# Patient Record
Sex: FEMALE | Race: WHITE | NOT HISPANIC OR LATINO | URBAN - METROPOLITAN AREA
[De-identification: names, ages, dates, MRNs, and addresses within clinical notes are randomized per-mention and may not be internally consistent; named-entity substitution may affect disease eponyms.]

---

## 2018-08-21 ENCOUNTER — APPOINTMENT (RX ONLY)
Dept: URBAN - METROPOLITAN AREA OTHER 10 | Facility: OTHER | Age: 33
Setting detail: DERMATOLOGY
End: 2018-08-21

## 2018-08-21 DIAGNOSIS — M32.10 SYSTEMIC LUPUS ERYTHEMATOSUS, ORGAN OR SYSTEM INVOLVEMENT UNSPECIFIED: ICD-10-CM

## 2018-08-21 DIAGNOSIS — L81.1 CHLOASMA: ICD-10-CM

## 2018-08-21 PROBLEM — L70.0 ACNE VULGARIS: Status: ACTIVE | Noted: 2018-08-21

## 2018-08-21 PROBLEM — F41.9 ANXIETY DISORDER, UNSPECIFIED: Status: ACTIVE | Noted: 2018-08-21

## 2018-08-21 PROCEDURE — ? PRESCRIPTION

## 2018-08-21 PROCEDURE — ? COUNSELING

## 2018-08-21 PROCEDURE — 99202 OFFICE O/P NEW SF 15 MIN: CPT

## 2018-08-21 PROCEDURE — ? TREATMENT REGIMEN

## 2018-08-21 RX ORDER — TRIAMCINOLONE ACETONIDE 1 MG/G
CREAM TOPICAL BID
Qty: 454 | Refills: 0 | Status: ERX | COMMUNITY
Start: 2018-08-21

## 2018-08-21 RX ADMIN — TRIAMCINOLONE ACETONIDE: 1 CREAM TOPICAL at 00:00

## 2018-08-21 ASSESSMENT — LOCATION DETAILED DESCRIPTION DERM
LOCATION DETAILED: LEFT UPPER CUTANEOUS LIP
LOCATION DETAILED: LEFT MEDIAL FOREHEAD
LOCATION DETAILED: RIGHT DISTAL POSTERIOR UPPER ARM
LOCATION DETAILED: LEFT DISTAL POSTERIOR UPPER ARM

## 2018-08-21 ASSESSMENT — LOCATION ZONE DERM
LOCATION ZONE: ARM
LOCATION ZONE: LIP
LOCATION ZONE: FACE

## 2018-08-21 ASSESSMENT — LOCATION SIMPLE DESCRIPTION DERM
LOCATION SIMPLE: LEFT POSTERIOR UPPER ARM
LOCATION SIMPLE: LEFT FOREHEAD
LOCATION SIMPLE: RIGHT POSTERIOR UPPER ARM
LOCATION SIMPLE: LEFT LIP

## 2018-08-21 NOTE — HPI: RASH
How Severe Is Your Rash?: moderate
Is This A New Presentation, Or A Follow-Up?: Rash
Additional History: Patient has lupus.

## 2018-08-21 NOTE — PROCEDURE: TREATMENT REGIMEN
Plan: Discussed discoloration defense 1-2 drops bid x 2 months then once a day
Detail Level: Simple
Otc Regimen: Differin pea size once daily on the face
Plan: Discussed avoid picking areas.
Initiate Treatment: Triamcinolone Cream bid x 2 weeks on arms

## 2018-10-04 ENCOUNTER — APPOINTMENT (RX ONLY)
Dept: URBAN - METROPOLITAN AREA OTHER 10 | Facility: OTHER | Age: 33
Setting detail: DERMATOLOGY
End: 2018-10-04

## 2018-10-04 DIAGNOSIS — L81.4 OTHER MELANIN HYPERPIGMENTATION: ICD-10-CM

## 2018-10-04 DIAGNOSIS — M32.10 SYSTEMIC LUPUS ERYTHEMATOSUS, ORGAN OR SYSTEM INVOLVEMENT UNSPECIFIED: ICD-10-CM | Status: WELL CONTROLLED

## 2018-10-04 DIAGNOSIS — L81.1 CHLOASMA: ICD-10-CM | Status: WELL CONTROLLED

## 2018-10-04 DIAGNOSIS — D22 MELANOCYTIC NEVI: ICD-10-CM

## 2018-10-04 DIAGNOSIS — D18.0 HEMANGIOMA: ICD-10-CM

## 2018-10-04 PROBLEM — D18.01 HEMANGIOMA OF SKIN AND SUBCUTANEOUS TISSUE: Status: ACTIVE | Noted: 2018-10-04

## 2018-10-04 PROBLEM — D22.5 MELANOCYTIC NEVI OF TRUNK: Status: ACTIVE | Noted: 2018-10-04

## 2018-10-04 PROBLEM — D22.71 MELANOCYTIC NEVI OF RIGHT LOWER LIMB, INCLUDING HIP: Status: ACTIVE | Noted: 2018-10-04

## 2018-10-04 PROBLEM — D22.61 MELANOCYTIC NEVI OF RIGHT UPPER LIMB, INCLUDING SHOULDER: Status: ACTIVE | Noted: 2018-10-04

## 2018-10-04 PROBLEM — D22.72 MELANOCYTIC NEVI OF LEFT LOWER LIMB, INCLUDING HIP: Status: ACTIVE | Noted: 2018-10-04

## 2018-10-04 PROBLEM — D22.62 MELANOCYTIC NEVI OF LEFT UPPER LIMB, INCLUDING SHOULDER: Status: ACTIVE | Noted: 2018-10-04

## 2018-10-04 PROCEDURE — ? COUNSELING

## 2018-10-04 PROCEDURE — ? TREATMENT REGIMEN

## 2018-10-04 PROCEDURE — 99214 OFFICE O/P EST MOD 30 MIN: CPT

## 2018-10-04 PROCEDURE — ? COSMETIC CONSULTATION: CHEMICAL PEELS

## 2018-10-04 ASSESSMENT — LOCATION DETAILED DESCRIPTION DERM
LOCATION DETAILED: LEFT LATERAL ABDOMEN
LOCATION DETAILED: LEFT MEDIAL FOREHEAD
LOCATION DETAILED: LEFT PROXIMAL POSTERIOR UPPER ARM
LOCATION DETAILED: RIGHT INFERIOR UPPER BACK
LOCATION DETAILED: RIGHT ANTERIOR DISTAL THIGH
LOCATION DETAILED: RIGHT LATERAL ABDOMEN
LOCATION DETAILED: LEFT MEDIAL UPPER BACK
LOCATION DETAILED: LEFT INFERIOR LATERAL MIDBACK
LOCATION DETAILED: LEFT ANTERIOR PROXIMAL THIGH
LOCATION DETAILED: SUPERIOR LUMBAR SPINE
LOCATION DETAILED: LEFT UPPER CUTANEOUS LIP
LOCATION DETAILED: RIGHT LATERAL BREAST 7-8:00 REGION
LOCATION DETAILED: PERIUMBILICAL SKIN
LOCATION DETAILED: LEFT SUPERIOR UPPER BACK
LOCATION DETAILED: LEFT ANTERIOR DISTAL UPPER ARM
LOCATION DETAILED: RIGHT SUPERIOR UPPER BACK
LOCATION DETAILED: RIGHT ANTERIOR DISTAL UPPER ARM
LOCATION DETAILED: RIGHT DISTAL POSTERIOR UPPER ARM
LOCATION DETAILED: RIGHT PROXIMAL POSTERIOR UPPER ARM
LOCATION DETAILED: RIGHT PROXIMAL PRETIBIAL REGION
LOCATION DETAILED: EPIGASTRIC SKIN
LOCATION DETAILED: LEFT DISTAL POSTERIOR UPPER ARM
LOCATION DETAILED: SUBXIPHOID

## 2018-10-04 ASSESSMENT — LOCATION ZONE DERM
LOCATION ZONE: TRUNK
LOCATION ZONE: FACE
LOCATION ZONE: LEG
LOCATION ZONE: LIP
LOCATION ZONE: ARM

## 2018-10-04 ASSESSMENT — LOCATION SIMPLE DESCRIPTION DERM
LOCATION SIMPLE: LEFT UPPER BACK
LOCATION SIMPLE: RIGHT POSTERIOR UPPER ARM
LOCATION SIMPLE: RIGHT UPPER ARM
LOCATION SIMPLE: RIGHT UPPER BACK
LOCATION SIMPLE: LEFT FOREHEAD
LOCATION SIMPLE: LEFT LIP
LOCATION SIMPLE: RIGHT PRETIBIAL REGION
LOCATION SIMPLE: LEFT LOWER BACK
LOCATION SIMPLE: LOWER BACK
LOCATION SIMPLE: ABDOMEN
LOCATION SIMPLE: LEFT UPPER ARM
LOCATION SIMPLE: RIGHT BREAST
LOCATION SIMPLE: LEFT THIGH
LOCATION SIMPLE: RIGHT THIGH
LOCATION SIMPLE: LEFT POSTERIOR UPPER ARM

## 2018-10-04 ASSESSMENT — SEVERITY ASSESSMENT
SEVERITY: MILD, SLIGHTLY DARKER THAN THE SURROUNDING NORMAL SKIN
SEVERITY: CLEAR

## 2018-10-04 ASSESSMENT — PAIN INTENSITY VAS: HOW INTENSE IS YOUR PAIN 0 BEING NO PAIN, 10 BEING THE MOST SEVERE PAIN POSSIBLE?: NO PAIN

## 2018-10-04 NOTE — PROCEDURE: TREATMENT REGIMEN
Continue Regimen: Differin pea size once daily on the face
Detail Level: Simple
Otc Regimen: Continue Neutrogena sunscreen
Modify Regimen: Triamcinolone Cream bid x 2 weeks only as needed for flares

## 2019-05-21 ENCOUNTER — APPOINTMENT (RX ONLY)
Dept: URBAN - METROPOLITAN AREA OTHER 10 | Facility: OTHER | Age: 34
Setting detail: DERMATOLOGY
End: 2019-05-21

## 2019-05-21 DIAGNOSIS — M32.10 SYSTEMIC LUPUS ERYTHEMATOSUS, ORGAN OR SYSTEM INVOLVEMENT UNSPECIFIED: ICD-10-CM

## 2019-05-21 DIAGNOSIS — L01.01 NON-BULLOUS IMPETIGO: ICD-10-CM

## 2019-05-21 PROCEDURE — 99213 OFFICE O/P EST LOW 20 MIN: CPT

## 2019-05-21 PROCEDURE — ? PRESCRIPTION

## 2019-05-21 PROCEDURE — ? COUNSELING

## 2019-05-21 PROCEDURE — ? TREATMENT REGIMEN

## 2019-05-21 RX ORDER — DESOXIMETASONE 2.5 MG/G
CREAM TOPICAL
Qty: 60 | Refills: 0 | Status: ERX | COMMUNITY
Start: 2019-05-21

## 2019-05-21 RX ORDER — NEOMYCIN SULFATE AND FLUOCINOLONE ACETONIDE 3.5; .25 MG/G; MG/G
CREAM TOPICAL
Qty: 60 | Refills: 0 | Status: ERX | COMMUNITY
Start: 2019-05-21

## 2019-05-21 RX ADMIN — DESOXIMETASONE: 2.5 CREAM TOPICAL at 00:00

## 2019-05-21 RX ADMIN — NEOMYCIN SULFATE AND FLUOCINOLONE ACETONIDE: 3.5; .25 CREAM TOPICAL at 00:00

## 2019-05-21 ASSESSMENT — LOCATION DETAILED DESCRIPTION DERM
LOCATION DETAILED: MID POSTERIOR NECK
LOCATION DETAILED: GLABELLA
LOCATION DETAILED: RIGHT DISTAL DORSAL FOREARM
LOCATION DETAILED: LEFT INFERIOR POSTERIOR HELIX
LOCATION DETAILED: MID-OCCIPITAL SCALP
LOCATION DETAILED: LEFT DISTAL POSTERIOR UPPER ARM

## 2019-05-21 ASSESSMENT — LOCATION SIMPLE DESCRIPTION DERM
LOCATION SIMPLE: RIGHT FOREARM
LOCATION SIMPLE: LEFT POSTERIOR UPPER ARM
LOCATION SIMPLE: LEFT EAR
LOCATION SIMPLE: POSTERIOR NECK
LOCATION SIMPLE: POSTERIOR SCALP
LOCATION SIMPLE: GLABELLA

## 2019-05-21 ASSESSMENT — LOCATION ZONE DERM
LOCATION ZONE: SCALP
LOCATION ZONE: ARM
LOCATION ZONE: FACE
LOCATION ZONE: EAR
LOCATION ZONE: NECK

## 2019-05-21 NOTE — PROCEDURE: TREATMENT REGIMEN
Detail Level: Simple
Initiate Treatment: Neosynalar Apply to affected areas of the ear twice daily x2 weeks then Discontinue

## 2019-06-12 ENCOUNTER — APPOINTMENT (RX ONLY)
Dept: URBAN - METROPOLITAN AREA OTHER 10 | Facility: OTHER | Age: 34
Setting detail: DERMATOLOGY
End: 2019-06-12

## 2019-06-12 DIAGNOSIS — M32.10 SYSTEMIC LUPUS ERYTHEMATOSUS, ORGAN OR SYSTEM INVOLVEMENT UNSPECIFIED: ICD-10-CM | Status: IMPROVED

## 2019-06-12 DIAGNOSIS — L01.01 NON-BULLOUS IMPETIGO: ICD-10-CM | Status: RESOLVING

## 2019-06-12 PROCEDURE — 99213 OFFICE O/P EST LOW 20 MIN: CPT

## 2019-06-12 PROCEDURE — ? COUNSELING

## 2019-06-12 PROCEDURE — ? TREATMENT REGIMEN

## 2019-06-12 ASSESSMENT — LOCATION DETAILED DESCRIPTION DERM
LOCATION DETAILED: LEFT INFERIOR POSTERIOR HELIX
LOCATION DETAILED: GLABELLA
LOCATION DETAILED: RIGHT DISTAL DORSAL FOREARM
LOCATION DETAILED: LEFT DISTAL POSTERIOR UPPER ARM
LOCATION DETAILED: MID POSTERIOR NECK
LOCATION DETAILED: MID-OCCIPITAL SCALP

## 2019-06-12 ASSESSMENT — LOCATION ZONE DERM
LOCATION ZONE: ARM
LOCATION ZONE: NECK
LOCATION ZONE: FACE
LOCATION ZONE: SCALP
LOCATION ZONE: EAR

## 2019-06-12 ASSESSMENT — LOCATION SIMPLE DESCRIPTION DERM
LOCATION SIMPLE: RIGHT FOREARM
LOCATION SIMPLE: POSTERIOR NECK
LOCATION SIMPLE: GLABELLA
LOCATION SIMPLE: LEFT POSTERIOR UPPER ARM
LOCATION SIMPLE: POSTERIOR SCALP
LOCATION SIMPLE: LEFT EAR

## 2019-06-12 NOTE — PROCEDURE: TREATMENT REGIMEN
Detail Level: Simple
Modify Regimen: Neosynalar once daily x1 more week then Discontinue
Continue Regimen: Desoximetasone as needed for flares

## 2022-06-26 ENCOUNTER — APPOINTMENT (OUTPATIENT)
Dept: CARDIOLOGY | Facility: HOSPITAL | Age: 37
End: 2022-06-26

## 2022-06-26 ENCOUNTER — HOSPITAL ENCOUNTER (EMERGENCY)
Facility: HOSPITAL | Age: 37
Discharge: HOME | End: 2022-06-26
Attending: EMERGENCY MEDICINE | Admitting: EMERGENCY MEDICINE

## 2022-06-26 VITALS
SYSTOLIC BLOOD PRESSURE: 125 MMHG | TEMPERATURE: 98.4 F | HEART RATE: 97 BPM | RESPIRATION RATE: 18 BRPM | WEIGHT: 161.6 LBS | DIASTOLIC BLOOD PRESSURE: 83 MMHG | OXYGEN SATURATION: 100 %

## 2022-06-26 DIAGNOSIS — I82.412 ACUTE DEEP VEIN THROMBOSIS (DVT) OF FEMORAL VEIN OF LEFT LOWER EXTREMITY: Primary | ICD-10-CM

## 2022-06-26 LAB
BH CV LOW VAS LEFT MID FEMORAL SPONT: 1
BH CV LOW VAS LEFT PROXIMAL FEMORAL SPONT: 1
BH CV LOWER VASCULAR LEFT COMMON FEMORAL AUGMENT: NORMAL
BH CV LOWER VASCULAR LEFT COMMON FEMORAL COMPETENT: NORMAL
BH CV LOWER VASCULAR LEFT COMMON FEMORAL COMPRESS: NORMAL
BH CV LOWER VASCULAR LEFT COMMON FEMORAL PHASIC: NORMAL
BH CV LOWER VASCULAR LEFT COMMON FEMORAL SPONT: NORMAL
BH CV LOWER VASCULAR LEFT DISTAL FEMORAL COMPRESS: NORMAL
BH CV LOWER VASCULAR LEFT GASTRONEMIUS COMPRESS: NORMAL
BH CV LOWER VASCULAR LEFT GREATER SAPH AK COMPRESS: NORMAL
BH CV LOWER VASCULAR LEFT GREATER SAPH BK COMPRESS: NORMAL
BH CV LOWER VASCULAR LEFT LESSER SAPH COMPRESS: NORMAL
BH CV LOWER VASCULAR LEFT MID FEMORAL AUGMENT: NORMAL
BH CV LOWER VASCULAR LEFT MID FEMORAL COMPETENT: NORMAL
BH CV LOWER VASCULAR LEFT MID FEMORAL COMPRESS: NORMAL
BH CV LOWER VASCULAR LEFT MID FEMORAL PHASIC: NORMAL
BH CV LOWER VASCULAR LEFT MID FEMORAL SPONT: NORMAL
BH CV LOWER VASCULAR LEFT PERONEAL COMPRESS: NORMAL
BH CV LOWER VASCULAR LEFT POPLITEAL AUGMENT: NORMAL
BH CV LOWER VASCULAR LEFT POPLITEAL COMPETENT: NORMAL
BH CV LOWER VASCULAR LEFT POPLITEAL COMPRESS: NORMAL
BH CV LOWER VASCULAR LEFT POPLITEAL PHASIC: NORMAL
BH CV LOWER VASCULAR LEFT POPLITEAL SPONT: NORMAL
BH CV LOWER VASCULAR LEFT POSTERIOR TIBIAL COMPRESS: NORMAL
BH CV LOWER VASCULAR LEFT PROFUNDA FEMORAL COMPRESS: NORMAL
BH CV LOWER VASCULAR LEFT PROXIMAL FEMORAL AUGMENT: NORMAL
BH CV LOWER VASCULAR LEFT PROXIMAL FEMORAL COMPETENT: NORMAL
BH CV LOWER VASCULAR LEFT PROXIMAL FEMORAL COMPRESS: NORMAL
BH CV LOWER VASCULAR LEFT PROXIMAL FEMORAL PHASIC: NORMAL
BH CV LOWER VASCULAR LEFT PROXIMAL FEMORAL SPONT: NORMAL
BH CV LOWER VASCULAR LEFT SAPHENOFEMORAL JUNCTION COMPRESS: NORMAL
BH CV LOWER VASCULAR RIGHT COMMON FEMORAL AUGMENT: NORMAL
BH CV LOWER VASCULAR RIGHT COMMON FEMORAL COMPETENT: NORMAL
BH CV LOWER VASCULAR RIGHT COMMON FEMORAL COMPRESS: NORMAL
BH CV LOWER VASCULAR RIGHT COMMON FEMORAL PHASIC: NORMAL
BH CV LOWER VASCULAR RIGHT COMMON FEMORAL SPONT: NORMAL
MAXIMAL PREDICTED HEART RATE: 184 BPM
STRESS TARGET HR: 156 BPM

## 2022-06-26 PROCEDURE — 99283 EMERGENCY DEPT VISIT LOW MDM: CPT

## 2022-06-26 PROCEDURE — 93971 EXTREMITY STUDY: CPT

## 2022-06-26 PROCEDURE — 93971 EXTREMITY STUDY: CPT | Performed by: SURGERY

## 2022-06-26 RX ADMIN — APIXABAN 10 MG: 5 TABLET, FILM COATED ORAL at 15:57

## 2022-06-26 NOTE — ED PROVIDER NOTES
Subjective   History of Present Illness  Patient is a 37-year-old female that presents to the emergency department for left leg pain.  She states that 2 weeks ago her dog tripped her and she fell forward landing on her left knee.  She had significant swelling and bruising at that time.  Since then her leg has been more swollen and painful from ankle to hip.  Patient reports a history of DVT in the same leg 15 years ago and is concerned that she has another one at this time.  Review of Systems  I performed a 10 point review of systems which was all negative, except for the positives found in the HPI above.  Past Medical History:   Diagnosis Date   • Fibromyalgia    • Insomnia    • SLE (systemic lupus erythematosus) ([facility])        Allergies   Allergen Reactions   • Penicillins Hives   • Sulfa Antibiotics Hives       Past Surgical History:   Procedure Laterality Date   •  SECTION     • TONSILLECTOMY         History reviewed. No pertinent family history.    Social History     Socioeconomic History   • Marital status:    Tobacco Use   • Smoking status: Never Smoker   Substance and Sexual Activity   • Alcohol use: Yes     Comment: social           Objective   Physical Exam    General: Awake alert and in no acute distress     HEENT: Head normocephalic atraumatic, eyes PERRLA EOMI, nose normal, oropharynx normal.     Neck: Supple full range of motion, no meningismus, no lymphadenopathy     Heart: Regular rate and rhythm, no murmurs or rubs, 2+ radial pulses bilaterally     Lungs: Clear to auscultation bilaterally without wheezes or crackles, no respiratory distress     Abdomen: Soft, nontender, nondistended, no rebound or guarding     Back exam:  No L-spine tenderness.  No rash.     Skin: Warm, dry, no rash     Musculoskeletal: Normal range of motion, 1+ pitting edema in left ankle, moderate swelling extending from hip down, ecchymosis in various stages of feeling scattered about leg.  Tenderness with palpation  posterior thigh.  +2 DP pulses bilaterally     Neurologic: Oriented x3, no motor deficits no sensory deficits     Psychiatric: Mood appears stable, no psychosis  Procedures           ED Course                                           MDM  Number of Diagnoses or Management Options  Acute deep vein thrombosis (DVT) of femoral vein of left lower extremity (HCC)  Diagnosis management comments: Venous Doppler study revealed left femoral DVT.  Patient was started on Eliquis, first dose was given in the emergency department. I have spoken with the patient and explained the patient´s condition, diagnoses and treatment plan based on the information available to me at this time. I have answered the patient's questions and addressed any concerns. The patient has a good  understanding of the diagnosis, condition, and treatment plan as can be expected at this point. The vital signs have been stable. The patient´s condition is stable and appropriate for discharge from the emergency department.      The patient will pursue further outpatient evaluation with the primary care physician or other designated or consulting physician as outlined in the discharge instructions. They are agreeable to this plan of care and follow-up instructions have been explained in detail. The patient has received these instructions in written format and has expressed an understanding of the discharge instructions. The patient is aware that any significant change in condition or worsening of symptoms should prompt an immediate return to this or the closest emergency department or call to 911.       Amount and/or Complexity of Data Reviewed  Tests in the radiology section of CPT®: ordered and reviewed    Risk of Complications, Morbidity, and/or Mortality  Presenting problems: low  Diagnostic procedures: minimal  Management options: minimal    Patient Progress  Patient progress: stable      Final diagnoses:   Acute deep vein thrombosis (DVT) of femoral  vein of left lower extremity (HCC)       ED Disposition  ED Disposition     ED Disposition   Discharge    Condition   Stable    Comment   --             Provider, No Known  [facility]  [place] [address]    Call   Schedule appointment in 1 week for reevaluation.         Medication List      New Prescriptions    Apixaban Starter Pack tablet therapy pack  Take two 5 mg tablets by mouth every 12 hours for 7 days. Followed by one 5 mg tablet every 12 hours. (Dispense starter pack if available)           Where to Get Your Medications      These medications were sent to [id number] - [place], [place] - [address] AT [facility] - [phone] [facility] [phone] FX  [address] [place] [address]    Hours: 24-hours Phone: [phone]   · Apixaban Starter Pack tablet therapy pack          Sherley Barber APRN  06/26/22 [id number]

## 2022-06-26 NOTE — DISCHARGE INSTRUCTIONS
Take the Eliquis as prescribed.  Take Tylenol as needed for pain relief.  Wear compression stockings and elevate your legs frequently.    Return for any new concerns, particularly if you develop shortness of breath or chest pain.

## 2022-07-07 ENCOUNTER — OFFICE VISIT (OUTPATIENT)
Dept: FAMILY MEDICINE CLINIC | Facility: CLINIC | Age: 37
End: 2022-07-07

## 2022-07-07 VITALS
WEIGHT: 161 LBS | HEIGHT: 64 IN | DIASTOLIC BLOOD PRESSURE: 84 MMHG | BODY MASS INDEX: 27.49 KG/M2 | SYSTOLIC BLOOD PRESSURE: 120 MMHG | HEART RATE: 98 BPM | OXYGEN SATURATION: 99 %

## 2022-07-07 DIAGNOSIS — Z86.718 HISTORY OF DVT (DEEP VEIN THROMBOSIS): ICD-10-CM

## 2022-07-07 DIAGNOSIS — I82.402 ACUTE DEEP VEIN THROMBOSIS (DVT) OF LEFT LOWER EXTREMITY, UNSPECIFIED VEIN: ICD-10-CM

## 2022-07-07 DIAGNOSIS — M32.9 SYSTEMIC LUPUS ERYTHEMATOSUS, UNSPECIFIED SLE TYPE, UNSPECIFIED ORGAN INVOLVEMENT STATUS: Primary | ICD-10-CM

## 2022-07-07 DIAGNOSIS — F32.A DEPRESSION, UNSPECIFIED DEPRESSION TYPE: ICD-10-CM

## 2022-07-07 DIAGNOSIS — R53.83 FATIGUE, UNSPECIFIED TYPE: ICD-10-CM

## 2022-07-07 PROCEDURE — 99204 OFFICE O/P NEW MOD 45 MIN: CPT | Performed by: NURSE PRACTITIONER

## 2022-07-07 RX ORDER — BUPROPION HYDROCHLORIDE 150 MG/1
150 TABLET ORAL DAILY
Qty: 30 TABLET | Refills: 1 | Status: SHIPPED | OUTPATIENT
Start: 2022-07-07 | End: 2022-09-06 | Stop reason: SDUPTHER

## 2022-07-07 RX ORDER — MILNACIPRAN HCL 12.5 MG
TABLET ORAL 2 TIMES DAILY
COMMUNITY
End: 2022-09-06 | Stop reason: SDUPTHER

## 2022-07-07 RX ORDER — ERGOCALCIFEROL 1.25 MG/1
50000 CAPSULE ORAL WEEKLY
COMMUNITY

## 2022-07-07 NOTE — ASSESSMENT & PLAN NOTE
Patient has now had 2 DVTs, one essentially unprovoked, the second may have been provoked by injury but they are unsure.  Patient also has a history of lupus.  We will refer to hematology for further evaluation and work-up.

## 2022-07-07 NOTE — PROGRESS NOTES
Chief Complaint  Establish Care; DVT (Patient was seen at [facility] ER  for DVT.); and Thea, Fibromyalgia (Patient would like referral to rheumatology )    Subjective          Eloisa Sims presents to [facility] for   History of Present Illness  Patient presents today to establish care, has a history of lupus and fibromyalgia and was recently diagnosed with her second DVT.  Pt states she was recently dx with a DVT, this is her second one, she had another with no known cause at that time at the age of 20yrs. they started her on Eliquis Dosepak and told her to follow-up primary care    Pt does have a hx of lupus. Taking plaquenil     Patient is complaining of significant fatigue, states that she had an evaluation with her PCP before moving here from [place] recently related a significant amount of labs all of which were normal, states that they had decided to try some supplements to help with the fatigue but nothing has improved it.  Patient states that she is fixing to start a new job working full-time and that she needs something to help with the fatigue, she thinks that her depression contributes to it as well as possibly her depression medication, however she wishes to stay on Savella as she is doing fairly well with this.  Medical History  Past Medical History:   Diagnosis Date   • Anxiety YOLIE-    • Deep vein thrombosis (HCC)    • Fibromyalgia    • Fibromyalgia, primary    • Insomnia    • SLE (systemic lupus erythematosus) (HCC)      Surgical History  Past Surgical History:   Procedure Laterality Date   •  SECTION     • TONSILLECTOMY       Social History  Social History     Socioeconomic History   • Marital status:    Tobacco Use   • Smoking status: Never Smoker   Substance and Sexual Activity   • Alcohol use: Yes     Comment: social/rare drinker of wine   • Drug use: Never   • Sexual activity: Yes     Partners: Male     Comment:  had  "vasectomy       Current Outpatient Medications:   •  Apixaban Starter Pack tablet therapy pack, Take two 5 mg tablets by mouth every 12 hours for 7 days. Followed by one 5 mg tablet every 12 hours. (Dispense starter pack if available), Disp: 74 tablet, Rfl: 0  •  CLONAZEPAM PO, 1 mg by Other route. Take one tablet by mouth daily PRN, Disp: , Rfl:   •  FOLIC ACID PO, Take 1 mg by mouth Daily., Disp: , Rfl:   •  Hydroxychloroquine Sulfate (PLAQUENIL PO), Take 200 mg by mouth Daily., Disp: , Rfl:   •  Milnacipran HCl (Savella) 12.5 MG tablet, 2 (Two) Times a Day., Disp: , Rfl:   •  vitamin D (ERGOCALCIFEROL) 1.25 MG (54417 UT) capsule capsule, Take 50,000 Units by mouth 1 (One) Time Per Week., Disp: , Rfl:   •  Zolpidem Tartrate (AMBIEN PO), Take 10 mg by mouth every night at bedtime., Disp: , Rfl:   •  apixaban (ELIQUIS) 5 MG tablet tablet, Take 1 tablet by mouth 2 (Two) Times a Day., Disp: 60 tablet, Rfl: 0  •  buPROPion XL (Wellbutrin XL) 150 MG 24 hr tablet, Take 1 tablet by mouth Daily., Disp: 30 tablet, Rfl: 1    Review of Systems     Objective     /84   Pulse 98   Ht 162.6 cm (64\")   Wt 73 kg (161 lb)   SpO2 99%   BMI 27.64 kg/m²     Body mass index is 27.64 kg/m².    Physical Exam  Vitals reviewed.   Constitutional:       Appearance: Normal appearance. She is well-developed.   HENT:      Head: Normocephalic and atraumatic.      Right Ear: External ear normal.      Left Ear: External ear normal.   Eyes:      Conjunctiva/sclera: Conjunctivae normal.      Pupils: Pupils are equal, round, and reactive to light.   Cardiovascular:      Rate and Rhythm: Normal rate and regular rhythm.      Heart sounds: No murmur heard.    No friction rub. No gallop.      Comments: Trace edema left lower extremity  Pulmonary:      Effort: Pulmonary effort is normal.      Breath sounds: Normal breath sounds. No wheezing or rhonchi.   Skin:     General: Skin is warm and dry.   Neurological:      Mental Status: She is alert " and oriented to person, place, and time.      Cranial Nerves: No cranial nerve deficit.   Psychiatric:         Mood and Affect: Mood and affect normal.         Behavior: Behavior normal.         Thought Content: Thought content normal.         Judgment: Judgment normal.         Result Review :     The following data was reviewed by: TIERRA Sharma on 07/07/2022:                   Data reviewed: Recent hospitalization notes Recent ER visit as well as Doppler of the left lower extremity              {CC Problem List  Visit Diagnosis  ROS  Review (Popup)  [facility]  Quality  BestPractice  Medications  SmartSets  SnapShot Encounters  Media :23}   Assessment:  Diagnoses and all orders for this visit:    1. Systemic lupus erythematosus, unspecified SLE type, unspecified organ involvement status (HCC) (Primary)  -     Ambulatory Referral to Rheumatology  -     Ambulatory Referral to Hematology    2. Acute deep vein thrombosis (DVT) of left lower extremity, unspecified vein (HCC)  Assessment & Plan:  Patient has now had 2 DVTs, one essentially unprovoked, the second may have been provoked by injury but they are unsure.  Patient also has a history of lupus.  We will refer to hematology for further evaluation and work-up.    Orders:  -     Ambulatory Referral to Hematology    3. History of DVT (deep vein thrombosis)  -     Ambulatory Referral to Hematology  -     apixaban (ELIQUIS) 5 MG tablet tablet; Take 1 tablet by mouth 2 (Two) Times a Day.  Dispense: 60 tablet; Refill: 0    4. Fatigue, unspecified type    5. Depression, unspecified depression type  Assessment & Plan:  Patient's depression is fairly well controlled, however she still experiences significant fatigue, she is unsure if this is in relation to the depression itself only or if the medication to treat the depression is also contributing factor.  After discussion we have decided to trial the addition of Wellbutrin.  Side effects and  admin discussed, risk of serotonin syndrome discussed.    Orders:  -     buPROPion XL (Wellbutrin XL) 150 MG 24 hr tablet; Take 1 tablet by mouth Daily.  Dispense: 30 tablet; Refill: 1              Follow Up     Return in about 2 months (around 9/7/2022).    Patient was given instructions and counseling regarding her condition or for health maintenance advice. Please see specific information pulled into the AVS if appropriate.     Ling Ignacio, TIERRA  07/07/2022

## 2022-07-07 NOTE — ASSESSMENT & PLAN NOTE
Patient's depression is fairly well controlled, however she still experiences significant fatigue, she is unsure if this is in relation to the depression itself only or if the medication to treat the depression is also contributing factor.  After discussion we have decided to trial the addition of Wellbutrin.  Side effects and admin discussed, risk of serotonin syndrome discussed.

## 2022-07-08 ENCOUNTER — PATIENT ROUNDING (BHMG ONLY) (OUTPATIENT)
Dept: FAMILY MEDICINE CLINIC | Facility: CLINIC | Age: 37
End: 2022-07-08

## 2022-07-08 NOTE — PROGRESS NOTES
A [employer] message has been sent to the patient for PATIENT ROUNDING with [facility].

## 2022-07-13 ENCOUNTER — LAB (OUTPATIENT)
Dept: ONCOLOGY | Facility: HOSPITAL | Age: 37
End: 2022-07-13

## 2022-07-13 ENCOUNTER — CONSULT (OUTPATIENT)
Dept: ONCOLOGY | Facility: HOSPITAL | Age: 37
End: 2022-07-13

## 2022-07-13 VITALS
BODY MASS INDEX: 27.36 KG/M2 | RESPIRATION RATE: 18 BRPM | TEMPERATURE: 97.8 F | OXYGEN SATURATION: 100 % | SYSTOLIC BLOOD PRESSURE: 152 MMHG | HEART RATE: 100 BPM | DIASTOLIC BLOOD PRESSURE: 86 MMHG | WEIGHT: 159.39 LBS

## 2022-07-13 DIAGNOSIS — M32.9 SYSTEMIC LUPUS ERYTHEMATOSUS, UNSPECIFIED SLE TYPE, UNSPECIFIED ORGAN INVOLVEMENT STATUS: ICD-10-CM

## 2022-07-13 DIAGNOSIS — Z86.718 HISTORY OF DVT (DEEP VEIN THROMBOSIS): ICD-10-CM

## 2022-07-13 DIAGNOSIS — I82.402 ACUTE DEEP VEIN THROMBOSIS (DVT) OF LEFT LOWER EXTREMITY, UNSPECIFIED VEIN: ICD-10-CM

## 2022-07-13 DIAGNOSIS — I82.402 ACUTE DEEP VEIN THROMBOSIS (DVT) OF LEFT LOWER EXTREMITY, UNSPECIFIED VEIN: Primary | ICD-10-CM

## 2022-07-13 LAB
ALBUMIN SERPL-MCNC: 4.41 G/DL (ref 3.5–5.2)
ALBUMIN/GLOB SERPL: 1.6 G/DL
ALP SERPL-CCNC: 62 U/L (ref 39–117)
ALT SERPL W P-5'-P-CCNC: 21 U/L (ref 1–33)
ANION GAP SERPL CALCULATED.3IONS-SCNC: 6.9 MMOL/L (ref 5–15)
AST SERPL-CCNC: 22 U/L (ref 1–32)
BASOPHILS # BLD AUTO: 0.02 10*3/MM3 (ref 0–0.2)
BASOPHILS NFR BLD AUTO: 0.9 % (ref 0–1.5)
BILIRUB SERPL-MCNC: 0.4 MG/DL (ref 0–1.2)
BUN SERPL-MCNC: 12 MG/DL (ref 6–20)
BUN/CREAT SERPL: 15.6 (ref 7–25)
CALCIUM SPEC-SCNC: 9 MG/DL (ref 8.6–10.5)
CHLORIDE SERPL-SCNC: 105 MMOL/L (ref 98–107)
CO2 SERPL-SCNC: 27.1 MMOL/L (ref 22–29)
CREAT SERPL-MCNC: 0.77 MG/DL (ref 0.57–1)
DEPRECATED RDW RBC AUTO: 38.6 FL (ref 37–54)
EGFRCR SERPLBLD CKD-EPI 2021: 102.7 ML/MIN/1.73
EOSINOPHIL # BLD AUTO: 0.02 10*3/MM3 (ref 0–0.4)
EOSINOPHIL NFR BLD AUTO: 0.9 % (ref 0.3–6.2)
ERYTHROCYTE [DISTWIDTH] IN BLOOD BY AUTOMATED COUNT: 13.3 % (ref 12.3–15.4)
GIANT PLATELETS: NORMAL
GLOBULIN UR ELPH-MCNC: 2.8 GM/DL
GLUCOSE SERPL-MCNC: 92 MG/DL (ref 65–99)
HCT VFR BLD AUTO: 34.5 % (ref 34–46.6)
HGB BLD-MCNC: 11.8 G/DL (ref 12–15.9)
IMM GRANULOCYTES # BLD AUTO: 0.01 10*3/MM3 (ref 0–0.05)
IMM GRANULOCYTES NFR BLD AUTO: 0.4 % (ref 0–0.5)
LARGE PLATELETS: NORMAL
LYMPHOCYTES # BLD AUTO: 0.5 10*3/MM3 (ref 0.7–3.1)
LYMPHOCYTES NFR BLD AUTO: 22.1 % (ref 19.6–45.3)
MCH RBC QN AUTO: 27.4 PG (ref 26.6–33)
MCHC RBC AUTO-ENTMCNC: 34.2 G/DL (ref 31.5–35.7)
MCV RBC AUTO: 80.2 FL (ref 79–97)
MONOCYTES # BLD AUTO: 0.29 10*3/MM3 (ref 0.1–0.9)
MONOCYTES NFR BLD AUTO: 12.8 % (ref 5–12)
NEUTROPHILS NFR BLD AUTO: 1.42 10*3/MM3 (ref 1.7–7)
NEUTROPHILS NFR BLD AUTO: 62.9 % (ref 42.7–76)
PLATELET # BLD AUTO: 159 10*3/MM3 (ref 140–450)
PMV BLD AUTO: 10.2 FL (ref 6–12)
POTASSIUM SERPL-SCNC: 3.3 MMOL/L (ref 3.5–5.2)
PROT SERPL-MCNC: 7.2 G/DL (ref 6–8.5)
RBC # BLD AUTO: 4.3 10*6/MM3 (ref 3.77–5.28)
RBC MORPH BLD: NORMAL
SMALL PLATELETS BLD QL SMEAR: ADEQUATE
SODIUM SERPL-SCNC: 139 MMOL/L (ref 136–145)
WBC MORPH BLD: NORMAL
WBC NRBC COR # BLD: 2.26 10*3/MM3 (ref 3.4–10.8)

## 2022-07-13 PROCEDURE — 81240 F2 GENE: CPT

## 2022-07-13 PROCEDURE — G0463 HOSPITAL OUTPT CLINIC VISIT: HCPCS

## 2022-07-13 PROCEDURE — 85025 COMPLETE CBC W/AUTO DIFF WBC: CPT

## 2022-07-13 PROCEDURE — 81241 F5 GENE: CPT

## 2022-07-13 PROCEDURE — 85007 BL SMEAR W/DIFF WBC COUNT: CPT

## 2022-07-13 PROCEDURE — 36415 COLL VENOUS BLD VENIPUNCTURE: CPT

## 2022-07-13 PROCEDURE — 99203 OFFICE O/P NEW LOW 30 MIN: CPT | Performed by: INTERNAL MEDICINE

## 2022-07-13 PROCEDURE — 80053 COMPREHEN METABOLIC PANEL: CPT

## 2022-07-13 PROCEDURE — 86147 CARDIOLIPIN ANTIBODY EA IG: CPT

## 2022-07-13 NOTE — PROGRESS NOTES
Eloisa Sims   : 1985     LOCATION: [facility] HEMAT[facility] & [facility]OGY     Chief Complaint  No chief complaint on file.    Referring Provider: TIERRA Valenzuela  PCP: Ling Ignacio APRN    Oncology/Hematology History    No history exists.           Subjective        History of Present Illness   Pt presents for consultation for hx of DVT.  Pt reports that she fell outside injuring herKnee which was swollen and severely bruises.   After a few days she had pain and swelling in her L thigh . ER evaluation revealed a DVT   · Acute-on-chronic left lower extremity deep vein thrombosis noted in the proximal femoral and mid femoral.  · There was superficial venous valvular incompetence noted in the left saphenofemoral junction.  · All other left sided veins appeared normal.      Pt has hx of previous DVT L lower leg [id number]. She was treated and remained off anticoagulation until her recent thrombus.  There is no family hx of thrombosis   Pt has hx of SLE    Review of Systems   Constitutional: Positive for fatigue. Negative for appetite change, diaphoresis, fever, unexpected weight gain and unexpected weight loss.   HENT: Negative for hearing loss, sore throat and voice change.    Eyes: Negative for blurred vision, double vision, pain, redness and visual disturbance.   Respiratory: Negative for cough, shortness of breath and wheezing.    Cardiovascular: Negative for chest pain, palpitations and leg swelling.   Endocrine: Negative for cold intolerance, heat intolerance, polydipsia and polyuria.   Genitourinary: Negative for decreased urine volume, difficulty urinating, frequency and urinary incontinence.   Musculoskeletal: Negative for arthralgias, back pain, joint swelling and myalgias.   Skin: Positive for rash (GENERALIZED - OPEN WOUNDS). Negative for color change, skin lesions and wound.   Neurological: Negative for dizziness, seizures, numbness and headache.   Hematological:  Negative for adenopathy. Does not bruise/bleed easily.   Psychiatric/Behavioral: Negative for depressed mood. The patient is not nervous/anxious.    All other systems reviewed and are negative.    Current Outpatient Medications on File Prior to Visit   Medication Sig Dispense Refill   • apixaban (ELIQUIS) 5 MG tablet tablet Take 1 tablet by mouth 2 (Two) Times a Day. 60 tablet 0   • Apixaban Starter Pack tablet therapy pack Take two 5 mg tablets by mouth every 12 hours for 7 days. Followed by one 5 mg tablet every 12 hours. (Dispense starter pack if available) 74 tablet 0   • buPROPion XL (Wellbutrin XL) 150 MG 24 hr tablet Take 1 tablet by mouth Daily. 30 tablet 1   • CLONAZEPAM PO 1 mg by Other route. Take one tablet by mouth daily PRN     • FOLIC ACID PO Take 1 mg by mouth Daily.     • Hydroxychloroquine Sulfate (PLAQUENIL PO) Take 200 mg by mouth Daily.     • Milnacipran HCl (Savella) 12.5 MG tablet 2 (Two) Times a Day.     • vitamin D (ERGOCALCIFEROL) 1.25 MG (20570 UT) capsule capsule Take 50,000 Units by mouth 1 (One) Time Per Week.     • Zolpidem Tartrate (AMBIEN PO) Take 10 mg by mouth every night at bedtime.       No current facility-administered medications on file prior to visit.       Allergies   Allergen Reactions   • Penicillins Hives   • Sulfa Antibiotics Hives       Past Medical History:   Diagnosis Date   • Anxiety -    • Deep vein thrombosis (HCC)    • Fibromyalgia    • Fibromyalgia, primary 2019   • Insomnia    • SLE (systemic lupus erythematosus) ([facility])      Past Surgical History:   Procedure Laterality Date   •  SECTION     • TONSILLECTOMY       Social History     Socioeconomic History   • Marital status:    Tobacco Use   • Smoking status: Never Smoker   Substance and Sexual Activity   • Alcohol use: Yes     Comment: social/rare drinker of wine   • Drug use: Never   • Sexual activity: Yes     Partners: Male     Comment:  had vasectomy     Family History    Problem Relation Age of Onset   • Anxiety disorder Mother    • Hyperlipidemia Father    • Cancer Paternal Grandmother    • Hyperlipidemia Paternal Grandmother        There is no immunization history on file for this patient.    Objective     /86   Pulse 100   Temp 97.8 °F (36.6 °C)   Resp 18   Wt 72.3 kg (159 lb 6.3 oz)   LMP 06/20/2022 (Approximate)   SpO2 100%   BMI 27.36 kg/m²         Physical Exam  Constitutional:       Appearance: Normal appearance.   HENT:      Nose: Nose normal.   Eyes:      Pupils: Pupils are equal, round, and reactive to light.   Cardiovascular:      Rate and Rhythm: Normal rate.   Pulmonary:      Effort: Pulmonary effort is normal.   Musculoskeletal:         General: Normal range of motion.      Cervical back: Normal range of motion.   Skin:     General: Skin is warm and dry.   Neurological:      Mental Status: She is alert.   Psychiatric:         Mood and Affect: Mood normal.               No results found for: IRON, LABIRON, TRANSFERRIN, TIBC, FERRITIN, [id number], FOLATE  ECOG score: 0           PHQ-9 Total Score:           Result Review :   The following data was reviewed by: Jeana Ch MD on 07/13/2022:  Lab Results   Component Value Date    HGB 11.8 (L) 07/13/2022    HCT 34.5 07/13/2022    MCV 80.2 07/13/2022     07/13/2022    WBC 2.26 (L) 07/13/2022    NEUTROABS 1.42 (L) 07/13/2022    LYMPHSABS 0.50 (L) 07/13/2022    MONOSABS 0.29 07/13/2022    EOSABS 0.02 07/13/2022    BASOSABS 0.02 07/13/2022       US 6/26/22  · Acute-on-chronic left lower extremity deep vein thrombosis noted in the proximal femoral and mid femoral.  · There was superficial venous valvular incompetence noted in the left saphenofemoral junction.  · All other left sided veins appeared normal.       Data reviewed: labs and US          Assessment and Plan      ASSESSMENT   L leg dvt  PLAN/RECOMMENDATIONS  This present dvt seems to have been provoked by her injury from her fall.   The previous  dvt 2005/2006 , Does not seem to be provoked by history.  Will check for inherited risks and acquired risk for thrombosis   check factor 5 leiden,prothrombin gene  mutation, antiphospholipid syndrome.   The lack of family hx of thrombosis, the long time between thrombotic episodes and the clearly new PROVOKED thrombosis , would suggest that indefinite anticoagulation would not be necessary.   F/u post hypercoaguable state screening tests    cont anticoagulation for 3-6 months for present dvt  Diagnoses and all orders for this visit:    1. Acute deep vein thrombosis (DVT) of left lower extremity, unspecified vein (HCC) (Primary)  -     Prothrombin gene mutation; Future  -     Factor 5 Leiden; Future  -     Cardiolipin Antibody, IgG; Future  -     Cardiolipin Antibody, IgM; Future  -     CBC & Differential; Future  -     Comprehensive Metabolic Panel; Future    2. Systemic lupus erythematosus, unspecified SLE type, unspecified organ involvement status (HCC)    3. History of DVT (deep vein thrombosis)      I spent 30 minutes caring for Eloisa on this date of service. This time includes time spent by me in the following activities: reviewing tests, obtaining and/or reviewing a separately obtained history, counseling and educating the patient/family/caregiver, ordering medications, tests, or procedures, documenting information in the medical record and independently interpreting results and communicating that information with the patient/family/caregiver    Patient was given instructions and counseling regarding her condition or for health maintenance advice. Please see specific information pulled into the AVS if appropriate.     Jeana Ch MD    7/13/2022

## 2022-07-14 ENCOUNTER — TELEPHONE (OUTPATIENT)
Dept: ONCOLOGY | Facility: HOSPITAL | Age: 37
End: 2022-07-14

## 2022-07-14 LAB
CARDIOLIPIN IGG SER IA-ACNC: <9 GPL U/ML (ref 0–14)
CARDIOLIPIN IGM SER IA-ACNC: <9 MPL U/ML (ref 0–12)

## 2022-07-14 NOTE — TELEPHONE ENCOUNTER
Please advise pt of slightly low potassium   She is to increase intake of potassium containing foods such as megan , oranges

## 2022-07-14 NOTE — TELEPHONE ENCOUNTER
Spoke with a patient and informed her  that her potassium was 3.3 , which is a  little low, she needs to increase her dietary intake of foods containing potassium, like bananas, Oranges, potatoes and avocados. Patient verbalized understanding.

## 2022-07-15 LAB
F5 GENE MUT ANL BLD/T: ABNORMAL
FACTOR II, DNA ANALYSIS: ABNORMAL

## 2022-08-03 ENCOUNTER — TELEPHONE (OUTPATIENT)
Dept: ONCOLOGY | Facility: HOSPITAL | Age: 37
End: 2022-08-03

## 2022-08-03 NOTE — TELEPHONE ENCOUNTER
LEFT DETAILED VM WITH NEW APPT AFTER 9/4 AND ON A Tuesday. IF THIS APPT DOESN'T WORK, I LEFT MY DIRECT NUMBER TO CALL BACK AND RESCHEDULE-- [phone]

## 2022-08-03 NOTE — TELEPHONE ENCOUNTER
Caller: RONNI SHI    Relationship to patient: Emergency Contact    Best call back number: [phone]    Chief complaint: CONFLICT OF SCHEDULE     Type of visit: FOLLOW UP     Requested date: NEEDING AFTER 09/04 AND NEEDING ON A TUES OR THUR ONLY     If rescheduling, when is the original appointment: 08/17    Additional notes:

## 2022-08-03 NOTE — TELEPHONE ENCOUNTER
Caller: RONNI SHI ()    Relationship: Emergency Contact    Best call back number: [phone]    Requested Prescriptions:     ELIQUIS     Pharmacy where request should be sent:      [id number] - [place], [place] - [address] AT [facility] - [phone] [facility] [phone]   [phone]    Additional details provided :     WANTED TO SEE IF CAN HAVE HER BLOOD THINNER MINERVA CALLED IN OR SENT IN AROUND 08/16 SINCE SHE WILL BE OUT AT THAT TIME  AND NOT ABLE TO COME IN FOR APPT 8/17 HAD TO RESCHEDULE DUE TO CONFLICT OF SCHEDULE         Does the patient have less than a 3 day supply:  [] Yes  [x] No

## 2022-08-04 NOTE — TELEPHONE ENCOUNTER
COLBY, PATIENT CANNOT DO 9/6/2022 UNLESS IT IS BEFORE 9:30 & RONNI APOLOGIZES, THEY WILL NEED A TUES. OR WEDS. IF THEY CANNOT GET IN EARLIER ON 9/6.  HAS TO DO WITH PATIENTS WORK TRAINING.  PLEASE CALL RONNI TO [facility].

## 2022-08-12 DIAGNOSIS — Z86.718 HISTORY OF DVT (DEEP VEIN THROMBOSIS): ICD-10-CM

## 2022-08-17 ENCOUNTER — APPOINTMENT (OUTPATIENT)
Dept: ONCOLOGY | Facility: HOSPITAL | Age: 37
End: 2022-08-17

## 2022-09-06 ENCOUNTER — OFFICE VISIT (OUTPATIENT)
Dept: FAMILY MEDICINE CLINIC | Facility: CLINIC | Age: 37
End: 2022-09-06

## 2022-09-06 ENCOUNTER — TELEPHONE (OUTPATIENT)
Dept: FAMILY MEDICINE CLINIC | Facility: CLINIC | Age: 37
End: 2022-09-06

## 2022-09-06 VITALS
HEIGHT: 64 IN | BODY MASS INDEX: 27.35 KG/M2 | SYSTOLIC BLOOD PRESSURE: 126 MMHG | HEART RATE: 106 BPM | OXYGEN SATURATION: 100 % | DIASTOLIC BLOOD PRESSURE: 78 MMHG | WEIGHT: 160.2 LBS

## 2022-09-06 DIAGNOSIS — F41.9 ANXIETY: ICD-10-CM

## 2022-09-06 DIAGNOSIS — G47.00 INSOMNIA, UNSPECIFIED TYPE: ICD-10-CM

## 2022-09-06 DIAGNOSIS — F32.A DEPRESSION, UNSPECIFIED DEPRESSION TYPE: ICD-10-CM

## 2022-09-06 DIAGNOSIS — R21 RASH: Primary | ICD-10-CM

## 2022-09-06 PROCEDURE — 80305 DRUG TEST PRSMV DIR OPT OBS: CPT | Performed by: NURSE PRACTITIONER

## 2022-09-06 PROCEDURE — 99214 OFFICE O/P EST MOD 30 MIN: CPT | Performed by: NURSE PRACTITIONER

## 2022-09-06 RX ORDER — BUPROPION HYDROCHLORIDE 150 MG/1
150 TABLET ORAL DAILY
Qty: 90 TABLET | Refills: 1 | Status: SHIPPED | OUTPATIENT
Start: 2022-09-06 | End: 2023-03-07 | Stop reason: SDUPTHER

## 2022-09-06 RX ORDER — ZOLPIDEM TARTRATE 10 MG/1
10 TABLET ORAL
Qty: 30 TABLET | Refills: 1 | Status: CANCELLED | OUTPATIENT
Start: 2022-09-06

## 2022-09-06 RX ORDER — MILNACIPRAN HCL 12.5 MG
90 TABLET ORAL 2 TIMES DAILY
Qty: 180 TABLET | Refills: 0 | Status: SHIPPED | OUTPATIENT
Start: 2022-09-06 | End: 2022-09-07 | Stop reason: SDUPTHER

## 2022-09-06 RX ORDER — METHYLPREDNISOLONE 4 MG/1
TABLET ORAL
Qty: 1 EACH | Refills: 0 | Status: SHIPPED | OUTPATIENT
Start: 2022-09-06

## 2022-09-06 NOTE — TELEPHONE ENCOUNTER
Attempted to return phone call from patient about her medication concerns but could not get a hold of her due to her having call restrictions on her phone.  I could not LVM and I was block to complete call

## 2022-09-06 NOTE — ASSESSMENT & PLAN NOTE
Well-controlled with Ambien, continue current dose, Tacos and UDS up-to-date, controlled substance contract completed

## 2022-09-06 NOTE — ASSESSMENT & PLAN NOTE
Patient states that she can tell an improvement since starting Wellbutrin with her fatigue, she would like to continue this medication.  We will continued at the current dose.

## 2022-09-07 ENCOUNTER — OFFICE VISIT (OUTPATIENT)
Dept: ONCOLOGY | Facility: HOSPITAL | Age: 37
End: 2022-09-07

## 2022-09-07 VITALS
SYSTOLIC BLOOD PRESSURE: 139 MMHG | WEIGHT: 159.17 LBS | BODY MASS INDEX: 27.32 KG/M2 | HEART RATE: 109 BPM | RESPIRATION RATE: 18 BRPM | DIASTOLIC BLOOD PRESSURE: 86 MMHG | OXYGEN SATURATION: 98 % | TEMPERATURE: 97.5 F

## 2022-09-07 DIAGNOSIS — G47.00 INSOMNIA, UNSPECIFIED TYPE: Primary | ICD-10-CM

## 2022-09-07 DIAGNOSIS — I82.4Y2 ACUTE DEEP VEIN THROMBOSIS (DVT) OF PROXIMAL VEIN OF LEFT LOWER EXTREMITY: ICD-10-CM

## 2022-09-07 DIAGNOSIS — M32.9 SYSTEMIC LUPUS ERYTHEMATOSUS, UNSPECIFIED SLE TYPE, UNSPECIFIED ORGAN INVOLVEMENT STATUS: ICD-10-CM

## 2022-09-07 DIAGNOSIS — D68.52 PROTHROMBIN GENE MUTATION: ICD-10-CM

## 2022-09-07 DIAGNOSIS — D68.51 FACTOR V LEIDEN: Primary | ICD-10-CM

## 2022-09-07 PROCEDURE — 99214 OFFICE O/P EST MOD 30 MIN: CPT | Performed by: NURSE PRACTITIONER

## 2022-09-07 PROCEDURE — G0463 HOSPITAL OUTPT CLINIC VISIT: HCPCS | Performed by: NURSE PRACTITIONER

## 2022-09-07 RX ORDER — MILNACIPRAN HCL 12.5 MG
90 TABLET ORAL 2 TIMES DAILY
Qty: 180 TABLET | Refills: 1 | Status: SHIPPED | OUTPATIENT
Start: 2022-09-07

## 2022-09-07 RX ORDER — ZOLPIDEM TARTRATE 10 MG/1
10 TABLET ORAL
Qty: 90 TABLET | Refills: 0 | Status: SHIPPED | OUTPATIENT
Start: 2022-09-07 | End: 2022-12-06 | Stop reason: SDUPTHER

## 2022-09-07 NOTE — PROGRESS NOTES
Chief Complaint  Lupus    Ling Ignacio, AP*  Ling Ignacio, APRN      Subjective          Eloisa Sims presents to [facility] for follow up on lab results and refill for Eliquis.     History of Present Illness   Ms. Sims was seen previously in consultation in July with Dr. Ch after developing a left upper leg DVT after a fall. She has had a previous clot in the right lower extremity. She was evaluated for genetic defects with the hypercoagulable state.     Factor II level was found to be heterozygous as well as factor V leiden was heterozygous. She also has lupus diagnosis and has not seen her rheumatology yet but scheduled for later in November. She is compliant with the daily Eliquis she is taking BID dosing. She reports her  PCP wants us to prescribe anti-coagulation.         Oncology/Hematology History    No history exists.       Review of Systems   Constitutional: Positive for fatigue.   HENT: Negative.    Eyes: Negative.    Respiratory: Negative.    Cardiovascular: Negative.    Gastrointestinal: Negative.    Endocrine: Negative.    Genitourinary: Negative.    Musculoskeletal: Negative.    Allergic/Immunologic: Negative.    Neurological: Negative.    Hematological: Negative.    Psychiatric/Behavioral: Negative.    All other systems reviewed and are negative.      Current Outpatient Medications on File Prior to Visit   Medication Sig Dispense Refill   • buPROPion XL (Wellbutrin XL) 150 MG 24 hr tablet Take 1 tablet by mouth Daily. 90 tablet 1   • CLONAZEPAM PO 1 mg by Other route. Take one tablet by mouth daily PRN     • FOLIC ACID PO Take 1 mg by mouth Daily.     • Hydroxychloroquine Sulfate (PLAQUENIL PO) Take 200 mg by mouth Daily.     • methylPREDNISolone (MEDROL) 4 MG dose pack Take as directed on package instructions. 1 each 0   • Milnacipran HCl (Savella) 12.5 MG tablet Take 90 mg by mouth 2 (Two) Times a Day. 180 tablet 0   • vitamin D  (ERGOCALCIFEROL) 1.25 MG (50315 UT) capsule capsule Take 50,000 Units by mouth 1 (One) Time Per Week.     • [DISCONTINUED] apixaban (ELIQUIS) 5 MG tablet tablet Take 1 tablet by mouth 2 (Two) Times a Day. 60 tablet 1   • [DISCONTINUED] Zolpidem Tartrate (AMBIEN PO) Take 10 mg by mouth every night at bedtime.       No current facility-administered medications on file prior to visit.       Allergies   Allergen Reactions   • Penicillins Hives   • Sulfa Antibiotics Hives     Past Medical History:   Diagnosis Date   • Anxiety YOLIE-    • Deep vein thrombosis (HCC)    • Fibromyalgia    • Fibromyalgia, primary    • Insomnia    • SLE (systemic lupus erythematosus) ([facility])      Past Surgical History:   Procedure Laterality Date   •  SECTION     • TONSILLECTOMY       Social History     Socioeconomic History   • Marital status:    Tobacco Use   • Smoking status: Never Smoker   • Smokeless tobacco: Never Used   Substance and Sexual Activity   • Alcohol use: Yes     Comment: social/rare drinker of wine   • Drug use: Never   • Sexual activity: Yes     Partners: Male     Comment:  had vasectomy     Family History   Problem Relation Age of Onset   • Anxiety disorder Mother    • Hyperlipidemia Father    • Cancer Paternal Grandmother    • Hyperlipidemia Paternal Grandmother        There is no immunization history on file for this patient.    Objective   Physical Exam  Vitals and nursing note reviewed.   Constitutional:       Appearance: Normal appearance. She is normal weight.   HENT:      Head: Normocephalic.      Nose: Nose normal.      Mouth/Throat:      Mouth: Mucous membranes are moist.   Eyes:      Pupils: Pupils are equal, round, and reactive to light.   Cardiovascular:      Rate and Rhythm: Normal rate and regular rhythm.      Pulses: Normal pulses.      Heart sounds: Normal heart sounds.   Pulmonary:      Effort: No respiratory distress.      Breath sounds: Normal breath sounds.    Abdominal:      General: Bowel sounds are normal.      Palpations: Abdomen is soft.   Musculoskeletal:         General: Normal range of motion.      Cervical back: Normal range of motion and neck supple.   Skin:     General: Skin is warm and dry.      Capillary Refill: Capillary refill takes less than 2 seconds.   Neurological:      General: No focal deficit present.      Mental Status: She is alert and oriented to person, place, and time.   Psychiatric:         Mood and Affect: Mood normal.         Behavior: Behavior normal.         Thought Content: Thought content normal.         Judgment: Judgment normal.         Vitals:    09/07/22 1302   BP: 139/86   Pulse: 109   Resp: 18   Temp: 97.5 °F (36.4 °C)   SpO2: 98%   Weight: 72.2 kg (159 lb 2.8 oz)   PainSc: 0-No pain     ECOG score: 0         ECOG: (0) Fully Active - Able to Carry On All Pre-disease Performance Without Restriction  Fall Risk Assessment was completed, and patient is at low risk for falls.  PHQ-9 Total Score: 0       The patient is  experiencing fatigue. Fatigue score: 7    PT/OT Functional Screening: PT fx screen: No needs identified  Speech Functional Screening: Speech fx screen: No needs identified  Rehab to be ordered: Rehab to be ordered: No needs identified        Result Review :   The following data was reviewed by: TIERRA Haas on 09/07/2022:  Lab Results   Component Value Date    HGB 11.8 (L) 07/13/2022    HCT 34.5 07/13/2022    MCV 80.2 07/13/2022     07/13/2022    WBC 2.26 (L) 07/13/2022    NEUTROABS 1.42 (L) 07/13/2022    LYMPHSABS 0.50 (L) 07/13/2022    MONOSABS 0.29 07/13/2022    EOSABS 0.02 07/13/2022    BASOSABS 0.02 07/13/2022     Lab Results   Component Value Date    GLUCOSE 92 07/13/2022    BUN 12 07/13/2022    CREATININE 0.77 07/13/2022     07/13/2022    K 3.3 (L) 07/13/2022     07/13/2022    CO2 27.1 07/13/2022    CALCIUM 9.0 07/13/2022    PROTEINTOT 7.2 07/13/2022    ALBUMIN 4.41 07/13/2022     BILITOT 0.4 07/13/2022    ALKPHOS 62 07/13/2022    AST 22 07/13/2022    ALT 21 07/13/2022          Assessment and Plan    Diagnoses and all orders for this visit:    1. Factor V Leiden (HCC) (Primary)  -     Discontinue: apixaban (ELIQUIS) 5 MG tablet tablet; Take 1 tablet by mouth 2 (Two) Times a Day.  Dispense: 60 tablet; Refill: 5  -     apixaban (ELIQUIS) 5 MG tablet tablet; Take 1 tablet by mouth 2 (Two) Times a Day.  Dispense: 60 tablet; Refill: 5  -     CBC & Differential; Future    2. Systemic lupus erythematosus, unspecified SLE type, unspecified organ involvement status (HCC)    3. Acute deep vein thrombosis (DVT) of proximal vein of left lower extremity (HCC)  -     Discontinue: apixaban (ELIQUIS) 5 MG tablet tablet; Take 1 tablet by mouth 2 (Two) Times a Day.  Dispense: 60 tablet; Refill: 5  -     apixaban (ELIQUIS) 5 MG tablet tablet; Take 1 tablet by mouth 2 (Two) Times a Day.  Dispense: 60 tablet; Refill: 5  -     CBC & Differential; Future    4. Prothrombin gene mutation (HCC)    She is positive for heterozygous prothrombin gene mutation as well as heterozygous FVL. Given this is her second DVT, I would recommend lifelong anti-coagulation with Eliquis.     She reports her PCP would like for us to refill her Eliquis.   Eliquis refills will be sent to her pharmacy at [place].     She will follow up in 6 months with CBC prior and follow up with MD. She was given a copy of her lab work today showing the genetic mutations.         I spent 25 minutes caring for Eloisa on this date of service. This time includes time spent by me in the following activities:preparing for the visit, reviewing tests, obtaining and/or reviewing a separately obtained history, performing a medically appropriate examination and/or evaluation , counseling and educating the patient/family/caregiver, ordering medications, tests, or procedures, referring and communicating with other health care professionals , documenting  information in the medical record, independently interpreting results and communicating that information with the patient/family/caregiver and care coordination    Patient Follow Up: 6 months.     Patient was given instructions and counseling regarding her condition or for health maintenance advice. Please see specific information pulled into the AVS if appropriate.     Gilda Son, APRN    9/7/2022

## 2022-09-07 NOTE — TELEPHONE ENCOUNTER
pts  stoped in today to  the written scripts to take to [facility] [place] [facility]. Ambien is filled but the Klonopin is not due for a refill yet.     He stated that her Savella was sent to [facility] [place] but needed it sent to [facility] instead. Re sending now.

## 2022-12-06 ENCOUNTER — OFFICE VISIT (OUTPATIENT)
Dept: FAMILY MEDICINE CLINIC | Facility: CLINIC | Age: 37
End: 2022-12-06

## 2022-12-06 ENCOUNTER — LAB (OUTPATIENT)
Dept: LAB | Facility: HOSPITAL | Age: 37
End: 2022-12-06

## 2022-12-06 VITALS
WEIGHT: 160 LBS | OXYGEN SATURATION: 98 % | HEART RATE: 106 BPM | HEIGHT: 64 IN | BODY MASS INDEX: 27.31 KG/M2 | DIASTOLIC BLOOD PRESSURE: 82 MMHG | SYSTOLIC BLOOD PRESSURE: 124 MMHG

## 2022-12-06 DIAGNOSIS — R94.6 ABNORMAL THYROID FUNCTION TEST: ICD-10-CM

## 2022-12-06 DIAGNOSIS — F41.9 ANXIETY: ICD-10-CM

## 2022-12-06 DIAGNOSIS — G47.00 INSOMNIA, UNSPECIFIED TYPE: ICD-10-CM

## 2022-12-06 DIAGNOSIS — E07.89 THYROID FULLNESS: ICD-10-CM

## 2022-12-06 DIAGNOSIS — M32.9 LUPUS: Primary | ICD-10-CM

## 2022-12-06 DIAGNOSIS — Z79.899 LONG-TERM USE OF PLAQUENIL: ICD-10-CM

## 2022-12-06 LAB
T4 FREE SERPL-MCNC: 0.98 NG/DL (ref 0.93–1.7)
TSH SERPL DL<=0.05 MIU/L-ACNC: 2.8 UIU/ML (ref 0.27–4.2)

## 2022-12-06 PROCEDURE — 84443 ASSAY THYROID STIM HORMONE: CPT | Performed by: NURSE PRACTITIONER

## 2022-12-06 PROCEDURE — 84439 ASSAY OF FREE THYROXINE: CPT | Performed by: NURSE PRACTITIONER

## 2022-12-06 PROCEDURE — 99214 OFFICE O/P EST MOD 30 MIN: CPT | Performed by: NURSE PRACTITIONER

## 2022-12-06 PROCEDURE — 36415 COLL VENOUS BLD VENIPUNCTURE: CPT | Performed by: NURSE PRACTITIONER

## 2022-12-06 RX ORDER — CLONAZEPAM 1 MG/1
1 TABLET ORAL DAILY PRN
Qty: 30 TABLET | Refills: 0 | Status: SHIPPED | OUTPATIENT
Start: 2022-12-06 | End: 2023-03-07 | Stop reason: SDUPTHER

## 2022-12-06 RX ORDER — FOLIC ACID 1 MG/1
1 TABLET ORAL DAILY
Qty: 90 TABLET | Refills: 1 | Status: SHIPPED | OUTPATIENT
Start: 2022-12-06

## 2022-12-06 RX ORDER — ZOLPIDEM TARTRATE 10 MG/1
10 TABLET ORAL
Qty: 90 TABLET | Refills: 0 | Status: SHIPPED | OUTPATIENT
Start: 2022-12-06 | End: 2023-03-07

## 2022-12-06 RX ORDER — CLONAZEPAM 1 MG/1
1 TABLET ORAL DAILY PRN
COMMUNITY
Start: 2022-10-09 | End: 2022-12-06 | Stop reason: SDUPTHER

## 2022-12-06 NOTE — PROGRESS NOTES
Chief Complaint  Depression, Insomnia, and Anxiety    SUBJECTIVE  Eloisa Sims presents to [facility] 3 month follow up.     Pt saw her new Rhematologist yesterday and is needing a referral to an eye specialist.  States she has been on Plaquenil for many years, states that she would like a referral to [facility] if she has saw them previously    Pt brought in a copy of her recent allergy testing.    Had some other labs- elevated TSH noted    Pt requesting refill of klonopin, using sparingly, anxiety    Patient also complaining of tender lymph nodes in her neck for the last week or 2.    History of Present Illness  Past Medical History:   Diagnosis Date   • Anxiety YOLIE- 2015   • Deep vein thrombosis (HCC)    • Fibromyalgia    • Fibromyalgia, primary    • Insomnia    • SLE (systemic lupus erythematosus) (HCC)       Family History   Problem Relation Age of Onset   • Anxiety disorder Mother    • Hyperlipidemia Father    • Cancer Paternal Grandmother    • Hyperlipidemia Paternal Grandmother       Past Surgical History:   Procedure Laterality Date   •  SECTION     • TONSILLECTOMY          Current Outpatient Medications:   •  apixaban (ELIQUIS) 5 MG tablet tablet, Take 1 tablet by mouth 2 (Two) Times a Day., Disp: 60 tablet, Rfl: 5  •  buPROPion XL (Wellbutrin XL) 150 MG 24 hr tablet, Take 1 tablet by mouth Daily., Disp: 90 tablet, Rfl: 1  •  clonazePAM (KlonoPIN) 1 MG tablet, Take 1 tablet by mouth Daily As Needed for Anxiety., Disp: 30 tablet, Rfl: 0  •  Hydroxychloroquine Sulfate (PLAQUENIL PO), Take 200 mg by mouth Daily., Disp: , Rfl:   •  Milnacipran HCl (Savella) 12.5 MG tablet, Take 90 mg by mouth 2 (Two) Times a Day., Disp: 180 tablet, Rfl: 1  •  vitamin D (ERGOCALCIFEROL) 1.25 MG (83879 UT) capsule capsule, Take 50,000 Units by mouth 1 (One) Time Per Week., Disp: , Rfl:   •  zolpidem (Ambien) 10 MG tablet, Take 1 tablet by mouth every night at bedtime., Disp:  "90 tablet, Rfl: 0  •  CLONAZEPAM PO, 1 mg by Other route. Take one tablet by mouth daily PRN, Disp: , Rfl:   •  folic acid (FOLVITE) 1 MG tablet, Take 1 tablet by mouth Daily., Disp: 90 tablet, Rfl: 1  •  methylPREDNISolone (MEDROL) 4 MG dose pack, Take as directed on package instructions., Disp: 1 each, Rfl: 0    OBJECTIVE  Vital Signs:   /82   Pulse 106   Ht 162.6 cm (64\")   Wt 72.6 kg (160 lb)   SpO2 98%   BMI 27.46 kg/m²    Estimated body mass index is 27.46 kg/m² as calculated from the following:    Height as of this encounter: 162.6 cm (64\").    Weight as of this encounter: 72.6 kg (160 lb).     Wt Readings from Last 3 Encounters:   12/06/22 72.6 kg (160 lb)   09/07/22 72.2 kg (159 lb 2.8 oz)   09/06/22 72.7 kg (160 lb 3.2 oz)     BP Readings from Last 3 Encounters:   12/06/22 124/82   09/07/22 139/86   09/06/22 126/78       Physical Exam  Vitals reviewed.   Constitutional:       Appearance: Normal appearance. She is well-developed.   HENT:      Head: Normocephalic and atraumatic.      Right Ear: External ear normal.      Left Ear: External ear normal.      Mouth/Throat:      Pharynx: No oropharyngeal exudate or posterior oropharyngeal erythema.   Eyes:      Conjunctiva/sclera: Conjunctivae normal.      Pupils: Pupils are equal, round, and reactive to light.   Neck:      Comments: Right side cervical chain mildly tender to palpation, however no palpable lymphadenopathy.  Thyroid feels mildly enlarged  Cardiovascular:      Rate and Rhythm: Normal rate and regular rhythm.      Heart sounds: No murmur heard.    No friction rub. No gallop.   Pulmonary:      Effort: Pulmonary effort is normal.      Breath sounds: Normal breath sounds. No wheezing or rhonchi.   Musculoskeletal:      Cervical back: Tenderness present.   Lymphadenopathy:      Cervical: No cervical adenopathy.   Skin:     General: Skin is warm and dry.   Neurological:      Mental Status: She is alert and oriented to person, place, and time. "      Cranial Nerves: No cranial nerve deficit.   Psychiatric:         Mood and Affect: Mood and affect normal.         Behavior: Behavior normal.         Thought Content: Thought content normal.         Judgment: Judgment normal.          Result Review    CMP    CMP 7/13/22   Glucose 92   BUN 12   Creatinine 0.77   Sodium 139   Potassium 3.3 (A)   Chloride 105   Calcium 9.0   Albumin 4.41   Total Bilirubin 0.4   Alkaline Phosphatase 62   AST (SGOT) 22   ALT (SGPT) 21   (A) Abnormal value            CBC    CBC 7/13/22   WBC 2.26 (A)   RBC 4.30   Hemoglobin 11.8 (A)   Hematocrit 34.5   MCV 80.2   MCH 27.4   MCHC 34.2   RDW 13.3   Platelets 159   (A) Abnormal value                      No Images in the past 120 days found..     The above data has been reviewed by TIERRA Sharma 12/06/2022 11:42 EST.          Patient Care Team:  Ling Ignacio APRN as PCP - General (Nurse Practitioner)           ASSESSMENT & PLAN    Diagnoses and all orders for this visit:    1. Lupus (HCC) (Primary)  -     Cancel: Ambulatory Referral to Ophthalmology  -     Ambulatory Referral to Ophthalmology    2. Insomnia, unspecified type  Overview:  Well-controlled with Ambien, continue current dose    Orders:  -     zolpidem (Ambien) 10 MG tablet; Take 1 tablet by mouth every night at bedtime.  Dispense: 90 tablet; Refill: 0    3. Long-term use of Plaquenil  -     Cancel: Ambulatory Referral to Ophthalmology  -     Ambulatory Referral to Ophthalmology    4. Anxiety  Overview:  Well-controlled.  Use Klonopin, continue current dose    Orders:  -     clonazePAM (KlonoPIN) 1 MG tablet; Take 1 tablet by mouth Daily As Needed for Anxiety.  Dispense: 30 tablet; Refill: 0    5. Abnormal thyroid function test  -     TSH+Free T4  -     US Thyroid    6. Thyroid fullness  -     US Thyroid    Other orders  -     folic acid (FOLVITE) 1 MG tablet; Take 1 tablet by mouth Daily.  Dispense: 90 tablet; Refill: 1       Tobacco Use: Low Risk    •  Smoking Tobacco Use: Never   • Smokeless Tobacco Use: Never   • Passive Exposure: Not on file       Follow Up     Return in about 3 months (around 3/6/2023).        Patient was given instructions and counseling regarding her condition or for health maintenance advice. Please see specific information pulled into the AVS if appropriate.   I have reviewed information obtained and documented by others and I have confirmed the accuracy of this documented note.    Ling Ignacio, APRN

## 2022-12-27 ENCOUNTER — HOSPITAL ENCOUNTER (OUTPATIENT)
Dept: ULTRASOUND IMAGING | Facility: HOSPITAL | Age: 37
Discharge: HOME | End: 2022-12-27
Admitting: NURSE PRACTITIONER

## 2022-12-27 PROCEDURE — 76536 US EXAM OF HEAD AND NECK: CPT

## 2023-01-10 ENCOUNTER — TELEPHONE (OUTPATIENT)
Dept: FAMILY MEDICINE CLINIC | Facility: CLINIC | Age: 38
End: 2023-01-10
Payer: OTHER GOVERNMENT

## 2023-01-10 NOTE — TELEPHONE ENCOUNTER
[facility]  [facility]. PROVIDER: DR SAFIA LANDRY. PROCEDURE CODES: Z79.899                 H43.812

## 2023-02-13 ENCOUNTER — LAB (OUTPATIENT)
Dept: LAB | Facility: HOSPITAL | Age: 38
End: 2023-02-13
Payer: OTHER GOVERNMENT

## 2023-02-13 ENCOUNTER — OFFICE VISIT (OUTPATIENT)
Dept: FAMILY MEDICINE CLINIC | Facility: CLINIC | Age: 38
End: 2023-02-13
Payer: OTHER GOVERNMENT

## 2023-02-13 VITALS
HEART RATE: 101 BPM | HEIGHT: 64 IN | DIASTOLIC BLOOD PRESSURE: 82 MMHG | WEIGHT: 159 LBS | SYSTOLIC BLOOD PRESSURE: 138 MMHG | OXYGEN SATURATION: 100 % | BODY MASS INDEX: 27.14 KG/M2

## 2023-02-13 DIAGNOSIS — R53.83 OTHER FATIGUE: ICD-10-CM

## 2023-02-13 DIAGNOSIS — Z20.828 EXPOSURE TO MONONUCLEOSIS SYNDROME: ICD-10-CM

## 2023-02-13 DIAGNOSIS — R05.8 OTHER COUGH: ICD-10-CM

## 2023-02-13 DIAGNOSIS — H10.9 CONJUNCTIVITIS, UNSPECIFIED CONJUNCTIVITIS TYPE, UNSPECIFIED LATERALITY: ICD-10-CM

## 2023-02-13 DIAGNOSIS — R05.8 OTHER COUGH: Primary | ICD-10-CM

## 2023-02-13 DIAGNOSIS — R11.0 NAUSEA: ICD-10-CM

## 2023-02-13 LAB
BASOPHILS # BLD AUTO: 0.01 10*3/MM3 (ref 0–0.2)
BASOPHILS NFR BLD AUTO: 0.4 % (ref 0–1.5)
DEPRECATED RDW RBC AUTO: 40.6 FL (ref 37–54)
EOSINOPHIL # BLD AUTO: 0.05 10*3/MM3 (ref 0–0.4)
EOSINOPHIL NFR BLD AUTO: 2.2 % (ref 0.3–6.2)
ERYTHROCYTE [DISTWIDTH] IN BLOOD BY AUTOMATED COUNT: 13.9 % (ref 12.3–15.4)
EXPIRATION DATE: NORMAL
FLUAV AG UPPER RESP QL IA.RAPID: NOT DETECTED
FLUBV AG UPPER RESP QL IA.RAPID: NOT DETECTED
HCT VFR BLD AUTO: 36.3 % (ref 34–46.6)
HGB BLD-MCNC: 11.8 G/DL (ref 12–15.9)
IMM GRANULOCYTES # BLD AUTO: 0 10*3/MM3 (ref 0–0.05)
IMM GRANULOCYTES NFR BLD AUTO: 0 % (ref 0–0.5)
INTERNAL CONTROL: NORMAL
LYMPHOCYTES # BLD AUTO: 0.6 10*3/MM3 (ref 0.7–3.1)
LYMPHOCYTES NFR BLD AUTO: 25.9 % (ref 19.6–45.3)
Lab: NORMAL
MCH RBC QN AUTO: 26.8 PG (ref 26.6–33)
MCHC RBC AUTO-ENTMCNC: 32.5 G/DL (ref 31.5–35.7)
MCV RBC AUTO: 82.5 FL (ref 79–97)
MONOCYTES # BLD AUTO: 0.31 10*3/MM3 (ref 0.1–0.9)
MONOCYTES NFR BLD AUTO: 13.4 % (ref 5–12)
NEUTROPHILS NFR BLD AUTO: 1.35 10*3/MM3 (ref 1.7–7)
NEUTROPHILS NFR BLD AUTO: 58.1 % (ref 42.7–76)
NRBC BLD AUTO-RTO: 0 /100 WBC (ref 0–0.2)
PLATELET # BLD AUTO: 208 10*3/MM3 (ref 140–450)
PMV BLD AUTO: 10.7 FL (ref 6–12)
RBC # BLD AUTO: 4.4 10*6/MM3 (ref 3.77–5.28)
SARS-COV-2 AG UPPER RESP QL IA.RAPID: NOT DETECTED
WBC NRBC COR # BLD: 2.32 10*3/MM3 (ref 3.4–10.8)

## 2023-02-13 PROCEDURE — 80053 COMPREHEN METABOLIC PANEL: CPT

## 2023-02-13 PROCEDURE — 99214 OFFICE O/P EST MOD 30 MIN: CPT | Performed by: NURSE PRACTITIONER

## 2023-02-13 PROCEDURE — 36415 COLL VENOUS BLD VENIPUNCTURE: CPT

## 2023-02-13 PROCEDURE — 87428 SARSCOV & INF VIR A&B AG IA: CPT | Performed by: NURSE PRACTITIONER

## 2023-02-13 PROCEDURE — 86664 EPSTEIN-BARR NUCLEAR ANTIGEN: CPT

## 2023-02-13 PROCEDURE — 85025 COMPLETE CBC W/AUTO DIFF WBC: CPT

## 2023-02-13 PROCEDURE — 86665 EPSTEIN-BARR CAPSID VCA: CPT

## 2023-02-13 RX ORDER — OFLOXACIN 3 MG/ML
1 SOLUTION/ DROPS OPHTHALMIC 4 TIMES DAILY
Qty: 5 ML | Refills: 0 | Status: SHIPPED | OUTPATIENT
Start: 2023-02-13 | End: 2023-02-20

## 2023-02-13 NOTE — PROGRESS NOTES
Chief Complaint  Cough, Nasal Congestion, Fatigue, Headache, and Nausea    SUBJECTIVE  Eloisa Sims presents to [facility]     Pt present s today with c/o thinking she may possibly have mono.  Patient reports having lots of symptoms, and her son tested positive for mono on Thursday.  Complaining of lefty eye crusting/mildly red.  Does not wear contacts.   Feels very fatigued, nausea, headache. Also having a lot of cough. Denies any sore throat.  Having some nausea but no vomiting or abdominal pain    Has had low grade fever, highest 99.8, self resolved without treatment    History of Present Illness  Past Medical History:   Diagnosis Date   • Anxiety YOLIE- 2015   • Deep vein thrombosis (HCC)    • Fibromyalgia    • Fibromyalgia, primary    • Insomnia    • SLE (systemic lupus erythematosus) (HCC)       Family History   Problem Relation Age of Onset   • Anxiety disorder Mother    • Hyperlipidemia Father    • Cancer Paternal Grandmother    • Hyperlipidemia Paternal Grandmother       Past Surgical History:   Procedure Laterality Date   •  SECTION     • TONSILLECTOMY          Current Outpatient Medications:   •  apixaban (ELIQUIS) 5 MG tablet tablet, Take 1 tablet by mouth 2 (Two) Times a Day., Disp: 60 tablet, Rfl: 5  •  buPROPion XL (Wellbutrin XL) 150 MG 24 hr tablet, Take 1 tablet by mouth Daily., Disp: 90 tablet, Rfl: 1  •  clonazePAM (KlonoPIN) 1 MG tablet, Take 1 tablet by mouth Daily As Needed for Anxiety., Disp: 30 tablet, Rfl: 0  •  folic acid (FOLVITE) 1 MG tablet, Take 1 tablet by mouth Daily., Disp: 90 tablet, Rfl: 1  •  Hydroxychloroquine Sulfate (PLAQUENIL PO), Take 200 mg by mouth Daily., Disp: , Rfl:   •  Milnacipran HCl (Savella) 12.5 MG tablet, Take 90 mg by mouth 2 (Two) Times a Day., Disp: 180 tablet, Rfl: 1  •  vitamin D (ERGOCALCIFEROL) 1.25 MG (22079 UT) capsule capsule, Take 50,000 Units by mouth 1 (One) Time Per Week., Disp: , Rfl:   •   "zolpidem (Ambien) 10 MG tablet, Take 1 tablet by mouth every night at bedtime., Disp: 90 tablet, Rfl: 0  •  CLONAZEPAM PO, 1 mg by Other route. Take one tablet by mouth daily PRN, Disp: , Rfl:   •  methylPREDNISolone (MEDROL) 4 MG dose pack, Take as directed on package instructions., Disp: 1 each, Rfl: 0  •  ofloxacin (Ocuflox) 0.3 % ophthalmic solution, Administer 1 drop into the left eye 4 (Four) Times a Day for 7 days., Disp: 5 mL, Rfl: 0    OBJECTIVE  Vital Signs:   /82   Pulse 101   Ht 162.6 cm (64\")   Wt 72.1 kg (159 lb)   SpO2 100%   BMI 27.29 kg/m²    Estimated body mass index is 27.29 kg/m² as calculated from the following:    Height as of this encounter: 162.6 cm (64\").    Weight as of this encounter: 72.1 kg (159 lb).     Wt Readings from Last 3 Encounters:   02/13/23 72.1 kg (159 lb)   12/06/22 72.6 kg (160 lb)   09/07/22 72.2 kg (159 lb 2.8 oz)     BP Readings from Last 3 Encounters:   02/13/23 138/82   12/06/22 124/82   09/07/22 139/86       Physical Exam  Vitals reviewed.   Constitutional:       Appearance: Normal appearance. She is well-developed.   HENT:      Head: Normocephalic and atraumatic.      Right Ear: Tympanic membrane, ear canal and external ear normal.      Left Ear: Tympanic membrane, ear canal and external ear normal.      Nose: Congestion and rhinorrhea present.      Mouth/Throat:      Pharynx: No oropharyngeal exudate or posterior oropharyngeal erythema.   Eyes:      Pupils: Pupils are equal, round, and reactive to light.      Comments: Mild erythema noted to conjunctiva of left eye, small amount discharge noted, no photophobia   Cardiovascular:      Rate and Rhythm: Normal rate and regular rhythm.      Heart sounds: No murmur heard.    No friction rub. No gallop.   Pulmonary:      Effort: Pulmonary effort is normal.      Breath sounds: Normal breath sounds. No wheezing or rhonchi.   Musculoskeletal:      Cervical back: No tenderness.   Lymphadenopathy:      Cervical: No " cervical adenopathy.   Skin:     General: Skin is warm and dry.   Neurological:      Mental Status: She is alert and oriented to person, place, and time.      Cranial Nerves: No cranial nerve deficit.   Psychiatric:         Mood and Affect: Mood and affect normal.         Behavior: Behavior normal.         Thought Content: Thought content normal.         Judgment: Judgment normal.          Result Review    CMP    CMP 7/13/22   Glucose 92   BUN 12   Creatinine 0.77   eGFR 102.7   Sodium 139   Potassium 3.3 (A)   Chloride 105   Calcium 9.0   Total Protein 7.2   Albumin 4.41   Globulin 2.8   Total Bilirubin 0.4   Alkaline Phosphatase 62   AST (SGOT) 22   ALT (SGPT) 21   Albumin/Globulin Ratio 1.6   BUN/Creatinine Ratio 15.6   Anion Gap 6.9   (A) Abnormal value       Comments are available for some flowsheets but are not being displayed.           CBC    CBC 7/13/22   WBC 2.26 (A)   RBC 4.30   Hemoglobin 11.8 (A)   Hematocrit 34.5   MCV 80.2   MCH 27.4   MCHC 34.2   RDW 13.3   Platelets 159   (A) Abnormal value            SARS Antigen   Date Value Ref Range Status   02/13/2023 Not Detected Not Detected, Presumptive Negative Final     Influenza A Antigen RAVINDRA   Date Value Ref Range Status   02/13/2023 Not Detected Not Detected Final     Influenza B Antigen RAVINDRA   Date Value Ref Range Status   02/13/2023 Not Detected Not Detected Final     Internal Control   Date Value Ref Range Status   02/13/2023 Passed Passed Final     Lot Number   Date Value Ref Range Status   02/13/2023 [id number]  Final     Expiration Date   Date Value Ref Range Status   02/13/2023 03/07/24  Final           US Thyroid    Result Date: 12/27/2022   Unremarkable thyroid sonogram.     MARTÍN BANSAL MD       Electronically Signed and Approved By: MARTÍN BANSAL MD on 12/27/2022 at 18:01                The above data has been reviewed by TIERRA Sharma 02/13/2023 13:23 EST.          Patient Care Team:  Ling Ignacio APRN as PCP - General (Nurse  Practitioner)        ASSESSMENT & PLAN    Diagnoses and all orders for this visit:    1. Other cough (Primary)  -     POCT SARS-CoV-2 Antigen RAVINDRA + Flu  -     CBC w AUTO Differential; Future  -     Comprehensive metabolic panel; Future  -     EBV Antibody Profile; Future    2. Conjunctivitis, unspecified conjunctivitis type, unspecified laterality  -     ofloxacin (Ocuflox) 0.3 % ophthalmic solution; Administer 1 drop into the left eye 4 (Four) Times a Day for 7 days.  Dispense: 5 mL; Refill: 0    3. Nausea  -     CBC w AUTO Differential; Future  -     Comprehensive metabolic panel; Future  -     EBV Antibody Profile; Future    4. Exposure to mononucleosis syndrome  -     CBC w AUTO Differential; Future  -     Comprehensive metabolic panel; Future  -     EBV Antibody Profile; Future    5. Other fatigue  -     CBC w AUTO Differential; Future  -     Comprehensive metabolic panel; Future  -     EBV Antibody Profile; Future       Discussed with patient symptoms mostly consistent with viral URI, symptomatic treatment only, we will obtain labs per patient request and concern for possible mono.  Discussed there is no specific treatment for mono as it is viral, supportive therapy only.  Follow-up if any new or worsening symptoms.      Tobacco Use: Low Risk    • Smoking Tobacco Use: Never   • Smokeless Tobacco Use: Never   • Passive Exposure: Not on file       Follow Up     Return if symptoms worsen or fail to improve.        Patient was given instructions and counseling regarding her condition or for health maintenance advice. Please see specific information pulled into the AVS if appropriate.   I have reviewed information obtained and documented by others and I have confirmed the accuracy of this documented note.    TIERRA Sharma

## 2023-02-14 LAB
ALBUMIN SERPL-MCNC: 4.5 G/DL (ref 3.5–5.2)
ALBUMIN/GLOB SERPL: 1.7 G/DL
ALP SERPL-CCNC: 79 U/L (ref 39–117)
ALT SERPL W P-5'-P-CCNC: 16 U/L (ref 1–33)
ANION GAP SERPL CALCULATED.3IONS-SCNC: 9 MMOL/L (ref 5–15)
AST SERPL-CCNC: 19 U/L (ref 1–32)
BILIRUB SERPL-MCNC: <0.2 MG/DL (ref 0–1.2)
BUN SERPL-MCNC: 13 MG/DL (ref 6–20)
BUN/CREAT SERPL: 17.1 (ref 7–25)
CALCIUM SPEC-SCNC: 9.2 MG/DL (ref 8.6–10.5)
CHLORIDE SERPL-SCNC: 106 MMOL/L (ref 98–107)
CO2 SERPL-SCNC: 26 MMOL/L (ref 22–29)
CREAT SERPL-MCNC: 0.76 MG/DL (ref 0.57–1)
EGFRCR SERPLBLD CKD-EPI 2021: 104.3 ML/MIN/1.73
GLOBULIN UR ELPH-MCNC: 2.7 GM/DL
GLUCOSE SERPL-MCNC: 83 MG/DL (ref 65–99)
POTASSIUM SERPL-SCNC: 3.9 MMOL/L (ref 3.5–5.2)
PROT SERPL-MCNC: 7.2 G/DL (ref 6–8.5)
SODIUM SERPL-SCNC: 141 MMOL/L (ref 136–145)

## 2023-02-15 LAB
EBV NA IGG SER IA-ACNC: <18 U/ML (ref 0–17.9)
EBV VCA IGG SER IA-ACNC: <18 U/ML (ref 0–17.9)
EBV VCA IGM SER IA-ACNC: <36 U/ML (ref 0–35.9)
SERVICE CMNT-IMP: NORMAL

## 2023-03-06 NOTE — PROGRESS NOTES
Chief Complaint  SLE    Ling Ignacio, AP*  Ling Ignacio, APRN      Subjective      Diagnosis: Heterozygous Prothrombin mutation and Heterozygous Factor V Leiden mutation    Current Treatment: Eliquis BID    Previous Treatment: None        Eloisa Sims presents to [facility] for follow up on lab results and refill for Eliquis.     History of Present Illness   Ms. Sims was seen previously in consultation in July with Dr. Ch after developing a left upper leg DVT after a fall. She has had a previous clot in the right lower extremity. She was evaluated for genetic defects with the hypercoagulable state.     Factor II level was found to be heterozygous as well as factor V leiden was heterozygous. She also has lupus diagnosis and has not seen her rheumatology yet but scheduled for later in November. She is compliant with the daily Eliquis she is taking BID dosing. She reports her  PCP wants us to prescribe anti-coagulation.     Interval History: Patient presents for follow-up for management of anticoagulation given her history of DVT as well as diagnosis of heterozygous prothrombin mutation and heterozygous factor V Leiden mutation.  Patient denies any blood clots since last clinic appointment.  She denies any blood loss or melena.  She reports tolerating Eliquis and compliance with this medication.  She denies any recent infections, fever, chills, night sweats, unintentional weight loss.  However, she reports noticing infections during lupus flares.  She says she has been informed of having chronic low white blood cell count by her previous hematologist; however, has not undergone any lab work to assess for cause of this lab finding.        Oncology/Hematology History    No history exists.       Review of Systems   Constitutional: Positive for fatigue (6/10). Negative for appetite change, diaphoresis, fever, unexpected weight gain and unexpected weight loss.    HENT: Negative.  Negative for hearing loss, mouth sores, sore throat, swollen glands, trouble swallowing and voice change.    Eyes: Negative.  Negative for blurred vision.   Respiratory: Negative.  Negative for cough, shortness of breath and wheezing.    Cardiovascular: Negative.  Negative for chest pain and palpitations.   Gastrointestinal: Negative.  Negative for abdominal pain, blood in stool, constipation, diarrhea, nausea and vomiting.   Endocrine: Negative.  Negative for cold intolerance and heat intolerance.   Genitourinary: Negative.  Negative for difficulty urinating, dysuria, frequency, hematuria and urinary incontinence.   Musculoskeletal: Negative.  Negative for arthralgias, back pain and myalgias.   Skin: Negative for rash, skin lesions and wound.   Allergic/Immunologic: Negative.    Neurological: Negative.  Negative for dizziness, seizures, weakness, numbness and headache.   Hematological: Negative.  Does not bruise/bleed easily.   Psychiatric/Behavioral: Negative.  Negative for depressed mood. The patient is not nervous/anxious.    All other systems reviewed and are negative.      Current Outpatient Medications on File Prior to Visit   Medication Sig Dispense Refill   • buPROPion XL (Wellbutrin XL) 150 MG 24 hr tablet Take 1 tablet by mouth Daily. 90 tablet 1   • clonazePAM (KlonoPIN) 1 MG tablet Take 1 tablet by mouth Daily As Needed for Anxiety. 30 tablet 0   • CLONAZEPAM PO 1 mg by Other route. Take one tablet by mouth daily PRN     • folic acid (FOLVITE) 1 MG tablet Take 1 tablet by mouth Daily. 90 tablet 1   • Hydroxychloroquine Sulfate (PLAQUENIL PO) Take 200 mg by mouth Daily.     • methylPREDNISolone (MEDROL) 4 MG dose pack Take as directed on package instructions. 1 each 0   • Milnacipran HCl (Savella) 12.5 MG tablet Take 90 mg by mouth 2 (Two) Times a Day. 180 tablet 1   • vitamin D (ERGOCALCIFEROL) 1.25 MG (48638 UT) capsule capsule Take 1 capsule by mouth 1 (One) Time Per Week.     •  zolpidem (Ambien) 10 MG tablet Take 1 tablet by mouth every night at bedtime. 90 tablet 0   • [DISCONTINUED] apixaban (ELIQUIS) 5 MG tablet tablet Take 1 tablet by mouth 2 (Two) Times a Day. 60 tablet 5     No current facility-administered medications on file prior to visit.       Allergies   Allergen Reactions   • Penicillins Hives   • Sulfa Antibiotics Hives     Past Medical History:   Diagnosis Date   • Anxiety YOLIE-    • Deep vein thrombosis (HCC)    • Fibromyalgia    • Fibromyalgia, primary    • Insomnia    • SLE (systemic lupus erythematosus) ([facility])      Past Surgical History:   Procedure Laterality Date   •  SECTION     • TONSILLECTOMY       Social History     Socioeconomic History   • Marital status:    Tobacco Use   • Smoking status: Never   • Smokeless tobacco: Never   Substance and Sexual Activity   • Alcohol use: Yes     Comment: social/rare drinker of wine   • Drug use: Never   • Sexual activity: Yes     Partners: Male     Comment:  had vasectomy     Family History   Problem Relation Age of Onset   • Anxiety disorder Mother    • Hyperlipidemia Father    • Cancer Paternal Grandmother    • Hyperlipidemia Paternal Grandmother        There is no immunization history on file for this patient.    Objective   Physical Exam  Vitals reviewed. Exam conducted with a chaperone present.   Constitutional:       Appearance: Normal appearance. She is normal weight.   HENT:      Head: Normocephalic.      Nose: Nose normal.      Mouth/Throat:      Mouth: Mucous membranes are moist.   Eyes:      Pupils: Pupils are equal, round, and reactive to light.   Cardiovascular:      Rate and Rhythm: Normal rate and regular rhythm.      Pulses: Normal pulses.      Heart sounds: Normal heart sounds.   Pulmonary:      Effort: No respiratory distress.      Breath sounds: Normal breath sounds.   Abdominal:      General: Bowel sounds are normal.      Palpations: Abdomen is soft.   Musculoskeletal:     "     General: Normal range of motion.      Cervical back: Normal range of motion and neck supple.   Skin:     General: Skin is warm and dry.      Capillary Refill: Capillary refill takes less than 2 seconds.   Neurological:      General: No focal deficit present.      Mental Status: She is alert and oriented to person, place, and time.         Vitals:    03/07/23 0904   BP: 123/78   Pulse: 107   Resp: 16   Temp: 98.9 °F (37.2 °C)   SpO2: 100%   Weight: 71.9 kg (158 lb 8.2 oz)   Height: 162.6 cm (64.02\")   PainSc: 0-No pain     ECOG score: 0         ECOG: (0) Fully Active - Able to Carry On All Pre-disease Performance Without Restriction  Fall Risk Assessment was completed, and patient is at low risk for falls.  PHQ-9 Total Score:         The patient is  experiencing fatigue. Fatigue score: 7          Result Review :   The following data was reviewed by: Ray Judge MD on 09/07/2022:  Lab Results   Component Value Date    HGB 12.2 03/07/2023    HCT 36.5 03/07/2023    MCV 82.0 03/07/2023     03/07/2023    WBC 2.79 (L) 03/07/2023    NEUTROABS 1.91 03/07/2023    LYMPHSABS 0.53 (L) 03/07/2023    MONOSABS 0.31 03/07/2023    EOSABS 0.03 03/07/2023    BASOSABS 0.01 03/07/2023     Lab Results   Component Value Date    GLUCOSE 83 02/13/2023    BUN 13 02/13/2023    CREATININE 0.76 02/13/2023     02/13/2023    K 3.9 02/13/2023     02/13/2023    CO2 26.0 02/13/2023    CALCIUM 9.2 02/13/2023    PROTEINTOT 7.2 02/13/2023    ALBUMIN 4.5 02/13/2023    BILITOT <0.2 02/13/2023    ALKPHOS 79 02/13/2023    AST 19 02/13/2023    ALT 16 02/13/2023          Assessment and Plan    Diagnoses and all orders for this visit:    1. Factor V Leiden (HCC) (Primary)  -     apixaban (ELIQUIS) 5 MG tablet tablet; Take 1 tablet by mouth 2 (Two) Times a Day.  Dispense: 60 tablet; Refill: 5  -     Flow Cytometry, Blood; Future    2. Acute deep vein thrombosis (DVT) of proximal vein of left lower extremity (HCC)  -     apixaban " (ELIQUIS) 5 MG tablet tablet; Take 1 tablet by mouth 2 (Two) Times a Day.  Dispense: 60 tablet; Refill: 5  -     Flow Cytometry, Blood; Future    3. Systemic lupus erythematosus, unspecified SLE type, unspecified organ involvement status (HCC)  -     apixaban (ELIQUIS) 5 MG tablet tablet; Take 1 tablet by mouth 2 (Two) Times a Day.  Dispense: 60 tablet; Refill: 5  -     Flow Cytometry, Blood; Future    4. Prothrombin gene mutation (HCC)  -     apixaban (ELIQUIS) 5 MG tablet tablet; Take 1 tablet by mouth 2 (Two) Times a Day.  Dispense: 60 tablet; Refill: 5  -     Flow Cytometry, Blood; Future    5. Leukopenia, unspecified type  -     Flow Cytometry, Blood; Future    She is positive for heterozygous prothrombin gene mutation as well as heterozygous FVL. Given her history of 2 DVTs, along with history of heterozygous prothrombin gene mutation as well as heterozygous factor V Leiden mutation, I would recommend lifelong anti-coagulation with Eliquis.   She reports her PCP would like for us to refill her Eliquis.   Eliquis refills will be sent to her pharmacy at [place].     Leukopenia: Per lab work in our system it appears patient has history of low white blood cell count has been occurring for several years.  Patient without some occasional infections during lupus flares, but without any other B symptoms.  Discussed with patient plan to obtain flow cytometry today to assess for hematological malignancy; however, discussed with patient based on her history my suspicion for this is low.  Also, will obtain peripheral blood smear.    She will follow up in 1 month to discuss results of flow cytometry testing, with CBC and CMP prior to appointment.      I spent 30 minutes caring for Eloisa on this date of service. This time includes time spent by me in the following activities:preparing for the visit, reviewing tests, obtaining and/or reviewing a separately obtained history, performing a medically appropriate examination  and/or evaluation , counseling and educating the patient/family/caregiver, ordering medications, tests, or procedures, referring and communicating with other health care professionals , documenting information in the medical record, independently interpreting results and communicating that information with the patient/family/caregiver and care coordination    Patient Follow Up: 1 month    Patient was given instructions and counseling regarding her condition or for health maintenance advice. Please see specific information pulled into the AVS if appropriate.     Electronically signed by Ray Judge MD, 03/07/23, 10:03 AM VICKEY Judge MD    3/7/2023

## 2023-03-07 ENCOUNTER — LAB (OUTPATIENT)
Dept: ONCOLOGY | Facility: HOSPITAL | Age: 38
End: 2023-03-07
Payer: OTHER GOVERNMENT

## 2023-03-07 ENCOUNTER — OFFICE VISIT (OUTPATIENT)
Dept: ONCOLOGY | Facility: HOSPITAL | Age: 38
End: 2023-03-07
Payer: OTHER GOVERNMENT

## 2023-03-07 ENCOUNTER — OFFICE VISIT (OUTPATIENT)
Dept: FAMILY MEDICINE CLINIC | Facility: CLINIC | Age: 38
End: 2023-03-07
Payer: OTHER GOVERNMENT

## 2023-03-07 VITALS
SYSTOLIC BLOOD PRESSURE: 123 MMHG | OXYGEN SATURATION: 100 % | BODY MASS INDEX: 27.06 KG/M2 | RESPIRATION RATE: 16 BRPM | TEMPERATURE: 98.9 F | HEART RATE: 107 BPM | HEIGHT: 64 IN | DIASTOLIC BLOOD PRESSURE: 78 MMHG | WEIGHT: 158.51 LBS

## 2023-03-07 VITALS
DIASTOLIC BLOOD PRESSURE: 85 MMHG | HEIGHT: 64 IN | BODY MASS INDEX: 26.98 KG/M2 | OXYGEN SATURATION: 100 % | WEIGHT: 158 LBS | HEART RATE: 103 BPM | SYSTOLIC BLOOD PRESSURE: 118 MMHG

## 2023-03-07 DIAGNOSIS — D68.51 FACTOR V LEIDEN: Primary | ICD-10-CM

## 2023-03-07 DIAGNOSIS — L30.9 ECZEMA, UNSPECIFIED TYPE: Primary | ICD-10-CM

## 2023-03-07 DIAGNOSIS — I82.4Y2 ACUTE DEEP VEIN THROMBOSIS (DVT) OF PROXIMAL VEIN OF LEFT LOWER EXTREMITY: ICD-10-CM

## 2023-03-07 DIAGNOSIS — D68.52 PROTHROMBIN GENE MUTATION: ICD-10-CM

## 2023-03-07 DIAGNOSIS — M32.9 SYSTEMIC LUPUS ERYTHEMATOSUS, UNSPECIFIED SLE TYPE, UNSPECIFIED ORGAN INVOLVEMENT STATUS: ICD-10-CM

## 2023-03-07 DIAGNOSIS — D72.819 LEUKOPENIA, UNSPECIFIED TYPE: ICD-10-CM

## 2023-03-07 DIAGNOSIS — G47.00 INSOMNIA, UNSPECIFIED TYPE: ICD-10-CM

## 2023-03-07 DIAGNOSIS — F32.A DEPRESSION, UNSPECIFIED DEPRESSION TYPE: ICD-10-CM

## 2023-03-07 DIAGNOSIS — D68.51 FACTOR V LEIDEN: ICD-10-CM

## 2023-03-07 DIAGNOSIS — F41.9 ANXIETY: ICD-10-CM

## 2023-03-07 LAB
BASOPHILS # BLD AUTO: 0.01 10*3/MM3 (ref 0–0.2)
BASOPHILS NFR BLD AUTO: 0.4 % (ref 0–1.5)
DEPRECATED RDW RBC AUTO: 41 FL (ref 37–54)
EOSINOPHIL # BLD AUTO: 0.03 10*3/MM3 (ref 0–0.4)
EOSINOPHIL NFR BLD AUTO: 1.1 % (ref 0.3–6.2)
ERYTHROCYTE [DISTWIDTH] IN BLOOD BY AUTOMATED COUNT: 13.6 % (ref 12.3–15.4)
HCT VFR BLD AUTO: 36.5 % (ref 34–46.6)
HGB BLD-MCNC: 12.2 G/DL (ref 12–15.9)
IMM GRANULOCYTES # BLD AUTO: 0 10*3/MM3 (ref 0–0.05)
IMM GRANULOCYTES NFR BLD AUTO: 0 % (ref 0–0.5)
LYMPHOCYTES # BLD AUTO: 0.53 10*3/MM3 (ref 0.7–3.1)
LYMPHOCYTES NFR BLD AUTO: 19 % (ref 19.6–45.3)
MCH RBC QN AUTO: 27.4 PG (ref 26.6–33)
MCHC RBC AUTO-ENTMCNC: 33.4 G/DL (ref 31.5–35.7)
MCV RBC AUTO: 82 FL (ref 79–97)
MONOCYTES # BLD AUTO: 0.31 10*3/MM3 (ref 0.1–0.9)
MONOCYTES NFR BLD AUTO: 11.1 % (ref 5–12)
NEUTROPHILS NFR BLD AUTO: 1.91 10*3/MM3 (ref 1.7–7)
NEUTROPHILS NFR BLD AUTO: 68.4 % (ref 42.7–76)
PLATELET # BLD AUTO: 176 10*3/MM3 (ref 140–450)
PMV BLD AUTO: 10.1 FL (ref 6–12)
RBC # BLD AUTO: 4.45 10*6/MM3 (ref 3.77–5.28)
WBC NRBC COR # BLD: 2.79 10*3/MM3 (ref 3.4–10.8)

## 2023-03-07 PROCEDURE — 85025 COMPLETE CBC W/AUTO DIFF WBC: CPT

## 2023-03-07 PROCEDURE — 36415 COLL VENOUS BLD VENIPUNCTURE: CPT

## 2023-03-07 PROCEDURE — G0463 HOSPITAL OUTPT CLINIC VISIT: HCPCS | Performed by: INTERNAL MEDICINE

## 2023-03-07 PROCEDURE — 88184 FLOWCYTOMETRY/ TC 1 MARKER: CPT

## 2023-03-07 PROCEDURE — 99214 OFFICE O/P EST MOD 30 MIN: CPT | Performed by: INTERNAL MEDICINE

## 2023-03-07 PROCEDURE — 99214 OFFICE O/P EST MOD 30 MIN: CPT | Performed by: NURSE PRACTITIONER

## 2023-03-07 PROCEDURE — 88185 FLOWCYTOMETRY/TC ADD-ON: CPT

## 2023-03-07 RX ORDER — CLONAZEPAM 1 MG/1
1 TABLET ORAL DAILY PRN
Qty: 30 TABLET | Refills: 0 | Status: SHIPPED | OUTPATIENT
Start: 2023-03-07 | End: 2023-03-07 | Stop reason: SDUPTHER

## 2023-03-07 RX ORDER — BUPROPION HYDROCHLORIDE 150 MG/1
150 TABLET ORAL DAILY
Qty: 90 TABLET | Refills: 1 | Status: SHIPPED | OUTPATIENT
Start: 2023-03-07

## 2023-03-07 RX ORDER — ZOLPIDEM TARTRATE 12.5 MG/1
12.5 TABLET, FILM COATED, EXTENDED RELEASE ORAL NIGHTLY PRN
Qty: 90 TABLET | Refills: 0 | Status: SHIPPED | OUTPATIENT
Start: 2023-03-07

## 2023-03-07 RX ORDER — CLONAZEPAM 1 MG/1
1 TABLET ORAL DAILY PRN
Qty: 30 TABLET | Refills: 0 | Status: SHIPPED | OUTPATIENT
Start: 2023-03-07

## 2023-03-07 RX ORDER — ZOLPIDEM TARTRATE 10 MG/1
10 TABLET ORAL
Qty: 90 TABLET | Refills: 0 | Status: CANCELLED | OUTPATIENT
Start: 2023-03-07

## 2023-03-07 NOTE — PROGRESS NOTES
Chief Complaint  Insomnia, anxiety/depression, dry skin  SUBJECTIVE  Eloisa Gala Sims presents to [facility] 3 month follow up     Pt requesting refills to [facility] for the controlled.     Patient here today to follow-up on insomnia, anxiety and depression.  Reports that her Ambien, while it still makes her fall asleep well and sleep for about 3 to 4 hours, she is having problems waking up after that and not being able to fall back asleep.  States this has been slowly getting worse.  Patient states that she was trialed on Lunesta in the past, however it caused a very bad metallic taste in her mouth    Patient is also complaining of a patch of dry skin that is mildly red around her left, mostly at the outside corner.      UDS 22    History of Present Illness  Past Medical History:   Diagnosis Date   • Anxiety -    • Deep vein thrombosis (HCC)    • Fibromyalgia    • Fibromyalgia, primary    • Insomnia    • SLE (systemic lupus erythematosus) (HCC)       Family History   Problem Relation Age of Onset   • Anxiety disorder Mother    • Hyperlipidemia Father    • Cancer Paternal Grandmother    • Hyperlipidemia Paternal Grandmother       Past Surgical History:   Procedure Laterality Date   •  SECTION     • TONSILLECTOMY          Current Outpatient Medications:   •  apixaban (ELIQUIS) 5 MG tablet tablet, Take 1 tablet by mouth 2 (Two) Times a Day., Disp: 60 tablet, Rfl: 5  •  buPROPion XL (Wellbutrin XL) 150 MG 24 hr tablet, Take 1 tablet by mouth Daily., Disp: 90 tablet, Rfl: 1  •  clonazePAM (KlonoPIN) 1 MG tablet, Take 1 tablet by mouth Daily As Needed for Anxiety., Disp: 30 tablet, Rfl: 0  •  folic acid (FOLVITE) 1 MG tablet, Take 1 tablet by mouth Daily., Disp: 90 tablet, Rfl: 1  •  Hydroxychloroquine Sulfate (PLAQUENIL PO), Take 200 mg by mouth Daily., Disp: , Rfl:   •  Milnacipran HCl (Savella) 12.5 MG tablet, Take 90 mg by mouth 2 (Two) Times a Day.,  "Disp: 180 tablet, Rfl: 1  •  vitamin D (ERGOCALCIFEROL) 1.25 MG (43606 UT) capsule capsule, Take 1 capsule by mouth 1 (One) Time Per Week., Disp: , Rfl:   •  methylPREDNISolone (MEDROL) 4 MG dose pack, Take as directed on package instructions., Disp: 1 each, Rfl: 0  •  zolpidem CR (Ambien CR) 12.5 MG CR tablet, Take 1 tablet by mouth At Night As Needed for Sleep., Disp: 90 tablet, Rfl: 0    OBJECTIVE  Vital Signs:   /85   Pulse 103   Ht 162.6 cm (64.02\")   Wt 71.7 kg (158 lb)   SpO2 100%   BMI 27.10 kg/m²    Estimated body mass index is 27.1 kg/m² as calculated from the following:    Height as of this encounter: 162.6 cm (64.02\").    Weight as of this encounter: 71.7 kg (158 lb).     Wt Readings from Last 3 Encounters:   03/07/23 71.7 kg (158 lb)   03/07/23 71.9 kg (158 lb 8.2 oz)   02/13/23 72.1 kg (159 lb)     BP Readings from Last 3 Encounters:   03/07/23 118/85   03/07/23 123/78   02/13/23 138/82       Physical Exam  Vitals reviewed.   Constitutional:       Appearance: Normal appearance. She is well-developed.   HENT:      Head: Normocephalic and atraumatic.      Right Ear: External ear normal.      Left Ear: External ear normal.   Eyes:      Conjunctiva/sclera: Conjunctivae normal.      Pupils: Pupils are equal, round, and reactive to light.   Cardiovascular:      Rate and Rhythm: Normal rate and regular rhythm.      Heart sounds: No murmur heard.    No friction rub. No gallop.   Pulmonary:      Effort: Pulmonary effort is normal.      Breath sounds: Normal breath sounds. No wheezing or rhonchi.   Skin:     General: Skin is warm and dry.      Comments: Approximately quarter sized area of mildly erythematous dry skin noted next to left outer corner of the eye   Neurological:      Mental Status: She is alert and oriented to person, place, and time.      Cranial Nerves: No cranial nerve deficit.   Psychiatric:         Mood and Affect: Mood and affect normal.         Behavior: Behavior normal.         " Thought Content: Thought content normal.         Judgment: Judgment normal.          Result Review    CMP    CMP 7/13/22 2/13/23   Glucose 92 83   BUN 12 13   Creatinine 0.77 0.76   eGFR 102.7 104.3   Sodium 139 141   Potassium 3.3 (A) 3.9   Chloride 105 106   Calcium 9.0 9.2   Total Protein 7.2 7.2   Albumin 4.41 4.5   Globulin 2.8 2.7   Total Bilirubin 0.4 <0.2   Alkaline Phosphatase 62 79   AST (SGOT) 22 19   ALT (SGPT) 21 16   Albumin/Globulin Ratio 1.6 1.7   BUN/Creatinine Ratio 15.6 17.1   Anion Gap 6.9 9.0   (A) Abnormal value       Comments are available for some flowsheets but are not being displayed.           CBC    CBC 7/13/22 2/13/23 3/7/23   WBC 2.26 (A) 2.32 (A) 2.79 (A)   RBC 4.30 4.40 4.45   Hemoglobin 11.8 (A) 11.8 (A) 12.2   Hematocrit 34.5 36.3 36.5   MCV 80.2 82.5 82.0   MCH 27.4 26.8 27.4   MCHC 34.2 32.5 33.4   RDW 13.3 13.9 13.6   Platelets 159 208 176   (A) Abnormal value            TSH    TSH 12/6/22   TSH 2.800             US Thyroid    Result Date: 12/27/2022   Unremarkable thyroid sonogram.     MARTÍN BANSAL MD       Electronically Signed and Approved By: MARTÍN BANSAL MD on 12/27/2022 at 18:01                The above data has been reviewed by TIERRA Sharma 03/07/2023 11:22 EST.          Patient Care Team:  Ling Ignacio APRN as PCP - General (Nurse Practitioner)  Ray Judge MD as Consulting Physician (Hematology and Oncology)         ASSESSMENT & PLAN    Diagnoses and all orders for this visit:    1. Eczema, unspecified type (Primary)  Assessment & Plan:  Discussed with patient recommend 1% over-the-counter hydrocortisone cream to the area once daily for 5 days, stressed the importance of not using any more often or any longer than 5 days, and to obtain a good over-the-counter eczema cream and apply this nightly and continue to do so until area has healed.  Follow-up if no improvement      2. Depression, unspecified depression type  Comments:  Well-controlled with  Wellbutrin, continue current dose  Orders:  -     buPROPion XL (Wellbutrin XL) 150 MG 24 hr tablet; Take 1 tablet by mouth Daily.  Dispense: 90 tablet; Refill: 1    3. Anxiety  Overview:  Well-controlled.  Use Klonopin, continue current dose    Orders:  -     Discontinue: clonazePAM (KlonoPIN) 1 MG tablet; Take 1 tablet by mouth Daily As Needed for Anxiety.  Dispense: 30 tablet; Refill: 0  -     clonazePAM (KlonoPIN) 1 MG tablet; Take 1 tablet by mouth Daily As Needed for Anxiety.  Dispense: 30 tablet; Refill: 0    4. Insomnia, unspecified type  Overview:  Patient having issues with waking up after 3 or 4 hours and not being able to fall back asleep, we are going to trial switching to Ambien continuous release.  We will follow-up in 3 months, sooner if needed    Orders:  -     zolpidem CR (Ambien CR) 12.5 MG CR tablet; Take 1 tablet by mouth At Night As Needed for Sleep.  Dispense: 90 tablet; Refill: 0       Tobacco Use: Low Risk    • Smoking Tobacco Use: Never   • Smokeless Tobacco Use: Never   • Passive Exposure: Not on file       Follow Up     Return in about 3 months (around 6/7/2023), or if symptoms worsen or fail to improve.        Patient was given instructions and counseling regarding her condition or for health maintenance advice. Please see specific information pulled into the AVS if appropriate.   I have reviewed information obtained and documented by others and I have confirmed the accuracy of this documented note.    TIERRA Sharma

## 2023-03-10 LAB — REF LAB TEST METHOD: NORMAL

## 2023-04-11 NOTE — PROGRESS NOTES
Chief Complaint  Lupus    Ling Ignacio, AP*  Ling Ignacio, APRN      Subjective      Diagnosis: Heterozygous Prothrombin mutation and Heterozygous Factor V Leiden mutation    [id number] in process    Current Treatment: Eliquis BID-for thrombophilia    Previous Treatment: None        Eloisa Sims presents to [facility] for follow up on lab results and refill for Eliquis.     History of Present Illness   Ms. Sims was seen previously in consultation in July with Dr. Ch after developing a left upper leg DVT after a fall. She has had a previous clot in the right lower extremity. She was evaluated for genetic defects with the hypercoagulable state.     Factor II level was found to be heterozygous as well as factor V leiden was heterozygous. She also has lupus diagnosis and has not seen her rheumatology yet but scheduled for later in November. She is compliant with the daily Eliquis she is taking BID dosing. She reports her  PCP wants us to prescribe anti-coagulation.     Interval History: Patient presents for follow-up for management of anticoagulation given her history of DVT as well as diagnosis of heterozygous prothrombin mutation and heterozygous factor V Leiden mutation.  She is also here to discuss results of lab work obtained at last clinic appointment for leukopenia. Patient denies any blood clots since last clinic appointment.  She denies any blood loss or melena.  She reports tolerating Eliquis and compliance with this medication.  She denies any recent infections, fever, chills, night sweats, unintentional weight loss. She says she has been informed of having chronic low white blood cell count by her previous hematologist; however, has not undergone any lab work to assess for cause of this lab finding. Pt denies any recent Lupus flares.       Oncology/Hematology History    No history exists.       Review of Systems   Constitutional:  Positive for fatigue (6/10). Negative for appetite change, diaphoresis, fever, unexpected weight gain and unexpected weight loss.   HENT: Negative.  Negative for hearing loss, mouth sores, sore throat, swollen glands, trouble swallowing and voice change.    Eyes: Negative.  Negative for blurred vision.   Respiratory: Negative.  Negative for cough, shortness of breath and wheezing.    Cardiovascular: Negative.  Negative for chest pain and palpitations.   Gastrointestinal: Negative.  Negative for abdominal pain, blood in stool, constipation, diarrhea, nausea and vomiting.   Endocrine: Negative.  Negative for cold intolerance and heat intolerance.   Genitourinary: Negative.  Negative for difficulty urinating, dysuria, frequency, hematuria and urinary incontinence.   Musculoskeletal: Negative.  Negative for arthralgias, back pain and myalgias.   Skin: Negative for rash, skin lesions and wound.   Allergic/Immunologic: Negative.    Neurological: Negative.  Negative for dizziness, seizures, weakness, numbness and headache.   Hematological: Negative.  Does not bruise/bleed easily.   Psychiatric/Behavioral: Negative.  Negative for depressed mood. The patient is not nervous/anxious.    All other systems reviewed and are negative.      Current Outpatient Medications on File Prior to Visit   Medication Sig Dispense Refill   • apixaban (ELIQUIS) 5 MG tablet tablet Take 1 tablet by mouth 2 (Two) Times a Day. 60 tablet 5   • buPROPion XL (Wellbutrin XL) 150 MG 24 hr tablet Take 1 tablet by mouth Daily. 90 tablet 1   • clonazePAM (KlonoPIN) 1 MG tablet Take 1 tablet by mouth Daily As Needed for Anxiety. 30 tablet 0   • folic acid (FOLVITE) 1 MG tablet Take 1 tablet by mouth Daily. 90 tablet 1   • Hydroxychloroquine Sulfate (PLAQUENIL PO) Take 200 mg by mouth Daily.     • Milnacipran HCl (Savella) 12.5 MG tablet Take 90 mg by mouth 2 (Two) Times a Day. 180 tablet 1   • triamcinolone (KENALOG) 0.5 % cream      • vitamin D  (ERGOCALCIFEROL) 1.25 MG (37595 UT) capsule capsule Take 1 capsule by mouth 1 (One) Time Per Week.     • zolpidem CR (Ambien CR) 12.5 MG CR tablet Take 1 tablet by mouth At Night As Needed for Sleep. 90 tablet 0   • [DISCONTINUED] methylPREDNISolone (MEDROL) 4 MG dose pack Take as directed on package instructions. 1 each 0     No current facility-administered medications on file prior to visit.       Allergies   Allergen Reactions   • Penicillins Hives   • Sulfa Antibiotics Hives     Past Medical History:   Diagnosis Date   • Anxiety YOLIE-    • Deep vein thrombosis    • Fibromyalgia    • Fibromyalgia, primary    • Insomnia    • SLE (systemic lupus erythematosus)      Past Surgical History:   Procedure Laterality Date   •  SECTION     • TONSILLECTOMY       Social History     Socioeconomic History   • Marital status:    Tobacco Use   • Smoking status: Never   • Smokeless tobacco: Never   Substance and Sexual Activity   • Alcohol use: Yes     Comment: social/rare drinker of wine   • Drug use: Never   • Sexual activity: Yes     Partners: Male     Comment:  had vasectomy     Family History   Problem Relation Age of Onset   • Anxiety disorder Mother    • Hyperlipidemia Father    • Cancer Paternal Grandmother    • Hyperlipidemia Paternal Grandmother        There is no immunization history on file for this patient.    Objective   Physical Exam  Vitals reviewed. Exam conducted with a chaperone present.   Constitutional:       Appearance: Normal appearance. She is normal weight.   HENT:      Head: Normocephalic.      Nose: Nose normal.      Mouth/Throat:      Mouth: Mucous membranes are moist.   Eyes:      Pupils: Pupils are equal, round, and reactive to light.   Cardiovascular:      Rate and Rhythm: Normal rate and regular rhythm.      Pulses: Normal pulses.      Heart sounds: Normal heart sounds.   Pulmonary:      Effort: No respiratory distress.      Breath sounds: Normal breath  sounds.   Abdominal:      General: Bowel sounds are normal.      Palpations: Abdomen is soft.   Musculoskeletal:         General: Normal range of motion.      Cervical back: Normal range of motion and neck supple.   Neurological:      General: No focal deficit present.      Mental Status: She is alert and oriented to person, place, and time.         Vitals:    04/12/23 1101   BP: 137/86   Pulse: 103   Resp: 18   Temp: 98.7 °F (37.1 °C)   TempSrc: Temporal   SpO2: 100%   Weight: 71.8 kg (158 lb 4.6 oz)   PainSc: 0-No pain     ECOG score: 0         ECOG: (0) Fully Active - Able to Carry On All Pre-disease Performance Without Restriction  Fall Risk Assessment was completed, and patient is at low risk for falls.  PHQ-9 Total Score:         The patient is  experiencing fatigue. Fatigue score: 7          Result Review :   The following data was reviewed by: Ray Judge MD on 09/07/2022:  Lab Results   Component Value Date    HGB 12.2 04/12/2023    HCT 36.1 04/12/2023    MCV 82.6 04/12/2023     04/12/2023    WBC 1.65 (C) 04/12/2023    NEUTROABS 1.09 (L) 04/12/2023    LYMPHSABS 0.53 (L) 03/07/2023    MONOSABS 0.31 03/07/2023    EOSABS 0.03 03/07/2023    BASOSABS 0.01 03/07/2023     Lab Results   Component Value Date    GLUCOSE 94 04/12/2023    BUN 14 04/12/2023    CREATININE 0.79 04/12/2023     04/12/2023    K 3.7 04/12/2023     (H) 04/12/2023    CO2 24.6 04/12/2023    CALCIUM 9.0 04/12/2023    PROTEINTOT 7.1 04/12/2023    ALBUMIN 4.4 04/12/2023    BILITOT 0.2 04/12/2023    ALKPHOS 83 04/12/2023    AST 14 04/12/2023    ALT 13 04/12/2023          Assessment and Plan    Diagnoses and all orders for this visit:    1. Leukopenia, unspecified type (Primary)  -     CBC & Differential; Future  -     Comprehensive Metabolic Panel; Future  -     Immunoglobulin Free LT Chains Blood; Future  -     Protein Elec + Interp, Serum; Future  -     IgG; Future  -     IgM; Future  -     IgA; Future  -     CBC &  Differential; Future  -     Cancel: Comprehensive Metabolic Panel; Future  -     Peripheral Blood Smear; Future         She is positive for heterozygous prothrombin gene mutation as well as heterozygous FVL. Given her history of 2 DVTs, along with history of heterozygous prothrombin gene mutation as well as heterozygous factor V Leiden mutation, I would recommend lifelong anti-coagulation with Eliquis.   She reports her PCP would like for us to refill her Eliquis.   Eliquis refills will be sent to her pharmacy at [place].     Leukopenia: Per lab work in our system it appears patient has history of low white blood cell count has been occurring for several years.  Patient without some occasional infections during lupus flares, but without any other B symptoms.  Results of flow cytometry obtained on peripheral blood at last clinic appointment revealed B cells that show nonspecific light chain binding.  Thus, discussed plan to obtain lab work to assess levels of immunoglobulins as well as SPEP, free light chains, immunofixation.  If this work-up is unremarkable suspect could be secondary to lupus medications prescribed by rheumatologist, I will then have to perform detailed medication review at that time.    Plan for patient to follow-up in 3 weeks to discuss results of lab work obtained today.    Patient verbalized understanding and agreement with plan.    Electronically signed by Ray Judge MD, 04/12/23, 4:35 PM EDT.      I spent 30 minutes caring for Eloisa on this date of service. This time includes time spent by me in the following activities:preparing for the visit, reviewing tests, obtaining and/or reviewing a separately obtained history, performing a medically appropriate examination and/or evaluation , counseling and educating the patient/family/caregiver, ordering medications, tests, or procedures, referring and communicating with other health care professionals , documenting information in the medical  record, independently interpreting results and communicating that information with the patient/family/caregiver and care coordination    Patient Follow Up: 1 month    Patient was given instructions and counseling regarding her condition or for health maintenance advice. Please see specific information pulled into the AVS if appropriate.           Ray Judge MD    4/12/2023

## 2023-04-12 ENCOUNTER — OFFICE VISIT (OUTPATIENT)
Dept: ONCOLOGY | Facility: HOSPITAL | Age: 38
End: 2023-04-12
Payer: OTHER GOVERNMENT

## 2023-04-12 ENCOUNTER — LAB (OUTPATIENT)
Dept: ONCOLOGY | Facility: HOSPITAL | Age: 38
End: 2023-04-12
Payer: OTHER GOVERNMENT

## 2023-04-12 VITALS
RESPIRATION RATE: 18 BRPM | DIASTOLIC BLOOD PRESSURE: 86 MMHG | WEIGHT: 158.29 LBS | TEMPERATURE: 98.7 F | OXYGEN SATURATION: 100 % | BODY MASS INDEX: 27.15 KG/M2 | HEART RATE: 103 BPM | SYSTOLIC BLOOD PRESSURE: 137 MMHG

## 2023-04-12 DIAGNOSIS — D72.819 LEUKOPENIA, UNSPECIFIED TYPE: ICD-10-CM

## 2023-04-12 DIAGNOSIS — D72.819 LEUKOPENIA, UNSPECIFIED TYPE: Primary | ICD-10-CM

## 2023-04-12 LAB
ALBUMIN SERPL-MCNC: 4.4 G/DL (ref 3.5–5.2)
ALBUMIN/GLOB SERPL: 1.6 G/DL
ALP SERPL-CCNC: 83 U/L (ref 39–117)
ALT SERPL W P-5'-P-CCNC: 13 U/L (ref 1–33)
ANION GAP SERPL CALCULATED.3IONS-SCNC: 8.4 MMOL/L (ref 5–15)
AST SERPL-CCNC: 14 U/L (ref 1–32)
BILIRUB SERPL-MCNC: 0.2 MG/DL (ref 0–1.2)
BUN SERPL-MCNC: 14 MG/DL (ref 6–20)
BUN/CREAT SERPL: 17.7 (ref 7–25)
CALCIUM SPEC-SCNC: 9 MG/DL (ref 8.6–10.5)
CHLORIDE SERPL-SCNC: 108 MMOL/L (ref 98–107)
CO2 SERPL-SCNC: 24.6 MMOL/L (ref 22–29)
CREAT SERPL-MCNC: 0.79 MG/DL (ref 0.57–1)
DEPRECATED RDW RBC AUTO: 39.9 FL (ref 37–54)
EGFRCR SERPLBLD CKD-EPI 2021: 98.9 ML/MIN/1.73
ERYTHROCYTE [DISTWIDTH] IN BLOOD BY AUTOMATED COUNT: 13.3 % (ref 12.3–15.4)
GLOBULIN UR ELPH-MCNC: 2.7 GM/DL
GLUCOSE SERPL-MCNC: 94 MG/DL (ref 65–99)
HCT VFR BLD AUTO: 36.1 % (ref 34–46.6)
HGB BLD-MCNC: 12.2 G/DL (ref 12–15.9)
IGA1 MFR SER: 234 MG/DL (ref 70–400)
IGG1 SER-MCNC: 1170 MG/DL (ref 700–1600)
IGM SERPL-MCNC: 53 MG/DL (ref 40–230)
LYMPHOCYTES # BLD MANUAL: 0.46 10*3/MM3 (ref 0.7–3.1)
LYMPHOCYTES NFR BLD MANUAL: 6 % (ref 5–12)
MCH RBC QN AUTO: 27.9 PG (ref 26.6–33)
MCHC RBC AUTO-ENTMCNC: 33.8 G/DL (ref 31.5–35.7)
MCV RBC AUTO: 82.6 FL (ref 79–97)
MONOCYTES # BLD: 0.1 10*3/MM3 (ref 0.1–0.9)
NEUTROPHILS # BLD AUTO: 1.09 10*3/MM3 (ref 1.7–7)
NEUTROPHILS NFR BLD MANUAL: 64 % (ref 42.7–76)
NEUTS BAND NFR BLD MANUAL: 2 % (ref 0–5)
PATHOLOGY REVIEW: YES
PLATELET # BLD AUTO: 214 10*3/MM3 (ref 140–450)
PMV BLD AUTO: 10.4 FL (ref 6–12)
POTASSIUM SERPL-SCNC: 3.7 MMOL/L (ref 3.5–5.2)
PROT SERPL-MCNC: 7.1 G/DL (ref 6–8.5)
RBC # BLD AUTO: 4.37 10*6/MM3 (ref 3.77–5.28)
RBC MORPH BLD: NORMAL
SMALL PLATELETS BLD QL SMEAR: ADEQUATE
SODIUM SERPL-SCNC: 141 MMOL/L (ref 136–145)
VARIANT LYMPHS NFR BLD MANUAL: 28 % (ref 19.6–45.3)
WBC MORPH BLD: NORMAL
WBC NRBC COR # BLD: 1.65 10*3/MM3 (ref 3.4–10.8)

## 2023-04-12 PROCEDURE — 85025 COMPLETE CBC W/AUTO DIFF WBC: CPT

## 2023-04-12 PROCEDURE — 83521 IG LIGHT CHAINS FREE EACH: CPT

## 2023-04-12 PROCEDURE — 99214 OFFICE O/P EST MOD 30 MIN: CPT | Performed by: INTERNAL MEDICINE

## 2023-04-12 PROCEDURE — 36415 COLL VENOUS BLD VENIPUNCTURE: CPT

## 2023-04-12 PROCEDURE — 80053 COMPREHEN METABOLIC PANEL: CPT

## 2023-04-12 PROCEDURE — 82784 ASSAY IGA/IGD/IGG/IGM EACH: CPT

## 2023-04-12 PROCEDURE — 84165 PROTEIN E-PHORESIS SERUM: CPT

## 2023-04-12 PROCEDURE — G0463 HOSPITAL OUTPT CLINIC VISIT: HCPCS | Performed by: INTERNAL MEDICINE

## 2023-04-12 RX ORDER — TRIAMCINOLONE ACETONIDE 5 MG/G
CREAM TOPICAL
COMMUNITY
Start: 2023-04-05

## 2023-04-13 LAB
ALBUMIN SERPL ELPH-MCNC: 3.9 G/DL (ref 2.9–4.4)
ALBUMIN/GLOB SERPL: 1.2 {RATIO} (ref 0.7–1.7)
ALPHA1 GLOB SERPL ELPH-MCNC: 0.3 G/DL (ref 0–0.4)
ALPHA2 GLOB SERPL ELPH-MCNC: 0.8 G/DL (ref 0.4–1)
B-GLOBULIN SERPL ELPH-MCNC: 1 G/DL (ref 0.7–1.3)
GAMMA GLOB SERPL ELPH-MCNC: 1.2 G/DL (ref 0.4–1.8)
GLOBULIN SER CALC-MCNC: 3.3 G/DL (ref 2.2–3.9)
KAPPA LC FREE SER-MCNC: 22.1 MG/L (ref 3.3–19.4)
KAPPA LC FREE/LAMBDA FREE SER: 1.26 {RATIO} (ref 0.26–1.65)
LABORATORY COMMENT REPORT: NORMAL
LAMBDA LC FREE SERPL-MCNC: 17.5 MG/L (ref 5.7–26.3)
M PROTEIN SERPL ELPH-MCNC: NORMAL G/DL
PROT PATTERN SERPL ELPH-IMP: NORMAL
PROT SERPL-MCNC: 7.2 G/DL (ref 6–8.5)

## 2023-04-14 LAB
CYTO UR: NORMAL
LAB AP CASE REPORT: NORMAL
LAB AP CLINICAL INFORMATION: NORMAL
PATH REPORT.FINAL DX SPEC: NORMAL
PATH REPORT.GROSS SPEC: NORMAL

## 2023-04-17 ENCOUNTER — TELEPHONE (OUTPATIENT)
Dept: ONCOLOGY | Facility: HOSPITAL | Age: 38
End: 2023-04-17
Payer: OTHER GOVERNMENT

## 2023-04-17 DIAGNOSIS — I82.4Y2 ACUTE DEEP VEIN THROMBOSIS (DVT) OF PROXIMAL VEIN OF LEFT LOWER EXTREMITY: ICD-10-CM

## 2023-04-17 DIAGNOSIS — M32.9 SYSTEMIC LUPUS ERYTHEMATOSUS, UNSPECIFIED SLE TYPE, UNSPECIFIED ORGAN INVOLVEMENT STATUS: ICD-10-CM

## 2023-04-17 DIAGNOSIS — D68.51 FACTOR V LEIDEN: ICD-10-CM

## 2023-04-17 DIAGNOSIS — D68.52 PROTHROMBIN GENE MUTATION: ICD-10-CM

## 2023-04-17 NOTE — TELEPHONE ENCOUNTER
[facility] called and states that they did not receive the rx for the Eliquis refill on 3/7/23 and is requesting it be resent. Please review and sign script.

## 2023-05-02 ENCOUNTER — OFFICE VISIT (OUTPATIENT)
Dept: FAMILY MEDICINE CLINIC | Facility: CLINIC | Age: 38
End: 2023-05-02
Payer: OTHER GOVERNMENT

## 2023-05-02 VITALS
HEART RATE: 107 BPM | SYSTOLIC BLOOD PRESSURE: 127 MMHG | BODY MASS INDEX: 26.94 KG/M2 | TEMPERATURE: 97.8 F | HEIGHT: 64 IN | DIASTOLIC BLOOD PRESSURE: 90 MMHG | OXYGEN SATURATION: 99 % | WEIGHT: 157.8 LBS

## 2023-05-02 DIAGNOSIS — H66.90 ACUTE OTITIS MEDIA, UNSPECIFIED OTITIS MEDIA TYPE: ICD-10-CM

## 2023-05-02 DIAGNOSIS — J06.9 UPPER RESPIRATORY TRACT INFECTION, UNSPECIFIED TYPE: ICD-10-CM

## 2023-05-02 DIAGNOSIS — R50.9 FEVER, UNSPECIFIED FEVER CAUSE: Primary | ICD-10-CM

## 2023-05-02 LAB
EXPIRATION DATE: NORMAL
FLUAV AG UPPER RESP QL IA.RAPID: NOT DETECTED
FLUBV AG UPPER RESP QL IA.RAPID: NOT DETECTED
INTERNAL CONTROL: NORMAL
Lab: NORMAL
SARS-COV-2 AG UPPER RESP QL IA.RAPID: NOT DETECTED

## 2023-05-02 PROCEDURE — 99213 OFFICE O/P EST LOW 20 MIN: CPT | Performed by: NURSE PRACTITIONER

## 2023-05-02 PROCEDURE — 87428 SARSCOV & INF VIR A&B AG IA: CPT | Performed by: NURSE PRACTITIONER

## 2023-05-02 RX ORDER — BROMPHENIRAMINE MALEATE, PSEUDOEPHEDRINE HYDROCHLORIDE, AND DEXTROMETHORPHAN HYDROBROMIDE 2; 30; 10 MG/5ML; MG/5ML; MG/5ML
5 SYRUP ORAL 4 TIMES DAILY PRN
Qty: 118 ML | Refills: 0 | Status: SHIPPED | OUTPATIENT
Start: 2023-05-02

## 2023-05-02 RX ORDER — METHYLPREDNISOLONE 4 MG/1
TABLET ORAL
Qty: 1 EACH | Refills: 0 | Status: SHIPPED | OUTPATIENT
Start: 2023-05-02

## 2023-05-02 RX ORDER — AZITHROMYCIN 250 MG/1
TABLET, FILM COATED ORAL
Qty: 6 TABLET | Refills: 0 | Status: SHIPPED | OUTPATIENT
Start: 2023-05-02

## 2023-05-02 NOTE — PROGRESS NOTES
Chief Complaint  Cough, Fever, and Earache     SUBJECTIVE  Eloisa Sims presents to [facility]     PT HAVING EARACHE AND COUGHING FOR ABOUT A WEEK. PT STATES HAS HAD LOW GRADE FEVER OFF AND ON .     Patient presents today with complaints of nasal drainage, congestion, cough, right ear pain.  States had a low-grade fever off and on, has not had a fever today, no shortness of breath outside of coughing spells, states when she gets into a coughing spell she coughs for quite a long time and then does become short of breath until coughing stops        History of Present Illness  Past Medical History:   Diagnosis Date   • Anxiety YOLIE-    • Deep vein thrombosis    • Fibromyalgia    • Fibromyalgia, primary    • Insomnia    • SLE (systemic lupus erythematosus)       Family History   Problem Relation Age of Onset   • Anxiety disorder Mother    • Hyperlipidemia Father    • Cancer Paternal Grandmother    • Hyperlipidemia Paternal Grandmother       Past Surgical History:   Procedure Laterality Date   •  SECTION     • TONSILLECTOMY          Current Outpatient Medications:   •  apixaban (ELIQUIS) 5 MG tablet tablet, Take 1 tablet by mouth 2 (Two) Times a Day., Disp: 60 tablet, Rfl: 5  •  buPROPion XL (Wellbutrin XL) 150 MG 24 hr tablet, Take 1 tablet by mouth Daily., Disp: 90 tablet, Rfl: 1  •  clonazePAM (KlonoPIN) 1 MG tablet, Take 1 tablet by mouth Daily As Needed for Anxiety., Disp: 30 tablet, Rfl: 0  •  folic acid (FOLVITE) 1 MG tablet, Take 1 tablet by mouth Daily., Disp: 90 tablet, Rfl: 1  •  Hydroxychloroquine Sulfate (PLAQUENIL PO), Take 200 mg by mouth Daily., Disp: , Rfl:   •  Milnacipran HCl (Savella) 12.5 MG tablet, Take 90 mg by mouth 2 (Two) Times a Day., Disp: 180 tablet, Rfl: 1  •  triamcinolone (KENALOG) 0.5 % cream, , Disp: , Rfl:   •  vitamin D (ERGOCALCIFEROL) 1.25 MG (53184 UT) capsule capsule, Take 1 capsule by mouth 1 (One) Time Per Week.,  "Disp: , Rfl:   •  zolpidem CR (Ambien CR) 12.5 MG CR tablet, Take 1 tablet by mouth At Night As Needed for Sleep., Disp: 90 tablet, Rfl: 0  •  azithromycin (Zithromax Z-Damian) 250 MG tablet, Take 2 tablets by mouth on day 1, then 1 tablet daily on days 2-5, Disp: 6 tablet, Rfl: 0  •  brompheniramine-pseudoephedrine-DM 30-2-10 MG/5ML syrup, Take 5 mL by mouth 4 (Four) Times a Day As Needed for Congestion or Cough., Disp: 118 mL, Rfl: 0  •  methylPREDNISolone (MEDROL) 4 MG dose pack, Take as directed on package instructions., Disp: 1 each, Rfl: 0    OBJECTIVE  Vital Signs:   /90   Pulse 107   Temp 97.8 °F (36.6 °C)   Ht 162.6 cm (64.02\")   Wt 71.6 kg (157 lb 12.8 oz)   LMP 04/18/2023 (Approximate)   SpO2 99%   BMI 27.07 kg/m²    Estimated body mass index is 27.07 kg/m² as calculated from the following:    Height as of this encounter: 162.6 cm (64.02\").    Weight as of this encounter: 71.6 kg (157 lb 12.8 oz).     Wt Readings from Last 3 Encounters:   05/02/23 71.6 kg (157 lb 12.8 oz)   04/12/23 71.8 kg (158 lb 4.6 oz)   03/07/23 71.7 kg (158 lb)     BP Readings from Last 3 Encounters:   05/02/23 127/90   04/12/23 137/86   03/07/23 118/85       Physical Exam  Vitals reviewed.   Constitutional:       Appearance: Normal appearance. She is well-developed.   HENT:      Head: Normocephalic and atraumatic.      Right Ear: Ear canal and external ear normal.      Left Ear: Tympanic membrane, ear canal and external ear normal.      Ears:      Comments: Right TM erythematous and bulging     Nose: Congestion and rhinorrhea present.      Mouth/Throat:      Pharynx: No oropharyngeal exudate or posterior oropharyngeal erythema.   Eyes:      Conjunctiva/sclera: Conjunctivae normal.      Pupils: Pupils are equal, round, and reactive to light.   Cardiovascular:      Rate and Rhythm: Normal rate and regular rhythm.      Heart sounds: No murmur heard.    No friction rub. No gallop.   Pulmonary:      Effort: Pulmonary effort is " normal.      Breath sounds: Normal breath sounds. No wheezing or rhonchi.   Skin:     General: Skin is warm and dry.   Neurological:      Mental Status: She is alert and oriented to person, place, and time.      Cranial Nerves: No cranial nerve deficit.   Psychiatric:         Mood and Affect: Mood and affect normal.         Behavior: Behavior normal.         Thought Content: Thought content normal.         Judgment: Judgment normal.          Result Review    CMP        7/13/2022    11:36 2/13/2023    13:52 4/12/2023    12:19   CMP   Glucose 92   83   94     BUN 12   13   14     Creatinine 0.77   0.76   0.79     EGFR 102.7   104.3   98.9     Sodium 139   141   141     Potassium 3.3   3.9   3.7     Chloride 105   106   108     Calcium 9.0   9.2   9.0     Total Protein   7.2     Total Protein 7.2   7.2   7.1     Albumin 4.41   4.5   4.4      3.9     Globulin   3.3     Globulin 2.8   2.7   2.7     Total Bilirubin 0.4   <0.2   0.2     Alkaline Phosphatase 62   79   83     AST (SGOT) 22   19   14     ALT (SGPT) 21   16   13     Albumin/Globulin Ratio 1.6   1.7   1.6     BUN/Creatinine Ratio 15.6   17.1   17.7     Anion Gap 6.9   9.0   8.4       CBC        2/13/2023    13:52 3/7/2023    08:59 4/12/2023    12:19   CBC   WBC 2.32   2.79   1.65     RBC 4.40   4.45   4.37     Hemoglobin 11.8   12.2   12.2     Hematocrit 36.3   36.5   36.1     MCV 82.5   82.0   82.6     MCH 26.8   27.4   27.9     MCHC 32.5   33.4   33.8     RDW 13.9   13.6   13.3     Platelets 208   176   214           TSH        12/6/2022    13:01   TSH   TSH 2.800         Rapid flu and COVID testing negative    No Images in the past 120 days found..     The above data has been reviewed by TIERRA Sharma 05/02/2023 10:57 EDT.          Patient Care Team:  Ling Ignacio APRN as PCP - General (Nurse Practitioner)  Ray Judge MD as Consulting Physician (Hematology and Oncology)           ASSESSMENT & PLAN    Diagnoses and all orders for this  visit:    1. Fever, unspecified fever cause (Primary)  -     POCT SARS-CoV-2 Antigen RAVINDRA    2. Acute otitis media, unspecified otitis media type  -     azithromycin (Zithromax Z-Damian) 250 MG tablet; Take 2 tablets by mouth on day 1, then 1 tablet daily on days 2-5  Dispense: 6 tablet; Refill: 0  -     methylPREDNISolone (MEDROL) 4 MG dose pack; Take as directed on package instructions.  Dispense: 1 each; Refill: 0    3. Upper respiratory tract infection, unspecified type  -     azithromycin (Zithromax Z-Damian) 250 MG tablet; Take 2 tablets by mouth on day 1, then 1 tablet daily on days 2-5  Dispense: 6 tablet; Refill: 0  -     methylPREDNISolone (MEDROL) 4 MG dose pack; Take as directed on package instructions.  Dispense: 1 each; Refill: 0  -     brompheniramine-pseudoephedrine-DM 30-2-10 MG/5ML syrup; Take 5 mL by mouth 4 (Four) Times a Day As Needed for Congestion or Cough.  Dispense: 118 mL; Refill: 0         Tobacco Use: Low Risk    • Smoking Tobacco Use: Never   • Smokeless Tobacco Use: Never   • Passive Exposure: Not on file       Follow Up     Return if symptoms worsen or fail to improve.        Patient was given instructions and counseling regarding her condition or for health maintenance advice. Please see specific information pulled into the AVS if appropriate.   I have reviewed information obtained and documented by others and I have confirmed the accuracy of this documented note.    TIERRA Sharma

## 2023-05-08 ENCOUNTER — TELEPHONE (OUTPATIENT)
Dept: FAMILY MEDICINE CLINIC | Facility: CLINIC | Age: 38
End: 2023-05-08

## 2023-05-08 NOTE — TELEPHONE ENCOUNTER
Caller: Eloisa Sims    Relationship to patient: Self    Best call back number: [phone]    Patient is needing: PATIENT STATES THAT HER EAR INFECTION HAS NOT CLEARED UP AND SHE WAS TOLD TO CALL BY PROVIDER IF THIS HAPPENED. SHE WOULD LIKE TO USE [facility] FOR ANY PRESCRIPTIONS.  PLEASE CALL TO ADVISE.

## 2023-05-09 ENCOUNTER — TELEPHONE (OUTPATIENT)
Dept: FAMILY MEDICINE CLINIC | Facility: CLINIC | Age: 38
End: 2023-05-09
Payer: OTHER GOVERNMENT

## 2023-05-09 RX ORDER — DOXYCYCLINE HYCLATE 100 MG/1
100 CAPSULE ORAL 2 TIMES DAILY
Qty: 20 CAPSULE | Refills: 0 | Status: SHIPPED | OUTPATIENT
Start: 2023-05-09

## 2023-05-09 NOTE — TELEPHONE ENCOUNTER
Per Ling:  I have sent in a new antibiotic, please instruct the patient to make sure that she takes it with food so to avoid upset stomach.         Called Eloisa and made her aware that Ling sent in a new antibiotic. Advised patient to take new medication with food to avoid upset stomach. She verbally stated her understanding. No further questions.

## 2023-05-09 NOTE — TELEPHONE ENCOUNTER
Pt ear infection is not healing and she was told to call for more antibiotics if this were to happen.    Pt  called earlier and is hoping that more antibiotics can be called in to [id number] -

## 2023-05-18 ENCOUNTER — OFFICE VISIT (OUTPATIENT)
Dept: ONCOLOGY | Facility: HOSPITAL | Age: 38
End: 2023-05-18
Payer: OTHER GOVERNMENT

## 2023-05-18 ENCOUNTER — LAB (OUTPATIENT)
Dept: ONCOLOGY | Facility: HOSPITAL | Age: 38
End: 2023-05-18
Payer: OTHER GOVERNMENT

## 2023-05-18 VITALS
WEIGHT: 158.95 LBS | TEMPERATURE: 97.4 F | DIASTOLIC BLOOD PRESSURE: 73 MMHG | RESPIRATION RATE: 18 BRPM | HEART RATE: 96 BPM | OXYGEN SATURATION: 100 % | SYSTOLIC BLOOD PRESSURE: 127 MMHG | HEIGHT: 64 IN | BODY MASS INDEX: 27.14 KG/M2

## 2023-05-18 DIAGNOSIS — D68.51 FACTOR V LEIDEN: Primary | ICD-10-CM

## 2023-05-18 DIAGNOSIS — D72.819 LEUKOPENIA, UNSPECIFIED TYPE: ICD-10-CM

## 2023-05-18 DIAGNOSIS — D68.52 PROTHROMBIN GENE MUTATION: ICD-10-CM

## 2023-05-18 LAB
BASOPHILS # BLD AUTO: 0.01 10*3/MM3 (ref 0–0.2)
BASOPHILS NFR BLD AUTO: 0.3 % (ref 0–1.5)
DEPRECATED RDW RBC AUTO: 40.4 FL (ref 37–54)
EOSINOPHIL # BLD AUTO: 0.02 10*3/MM3 (ref 0–0.4)
EOSINOPHIL NFR BLD AUTO: 0.7 % (ref 0.3–6.2)
ERYTHROCYTE [DISTWIDTH] IN BLOOD BY AUTOMATED COUNT: 13.3 % (ref 12.3–15.4)
HCT VFR BLD AUTO: 35.8 % (ref 34–46.6)
HGB BLD-MCNC: 12 G/DL (ref 12–15.9)
IMM GRANULOCYTES # BLD AUTO: 0 10*3/MM3 (ref 0–0.05)
IMM GRANULOCYTES NFR BLD AUTO: 0 % (ref 0–0.5)
LYMPHOCYTES # BLD AUTO: 0.5 10*3/MM3 (ref 0.7–3.1)
LYMPHOCYTES NFR BLD AUTO: 16.9 % (ref 19.6–45.3)
MCH RBC QN AUTO: 27.8 PG (ref 26.6–33)
MCHC RBC AUTO-ENTMCNC: 33.5 G/DL (ref 31.5–35.7)
MCV RBC AUTO: 82.9 FL (ref 79–97)
MONOCYTES # BLD AUTO: 0.31 10*3/MM3 (ref 0.1–0.9)
MONOCYTES NFR BLD AUTO: 10.5 % (ref 5–12)
NEUTROPHILS NFR BLD AUTO: 2.11 10*3/MM3 (ref 1.7–7)
NEUTROPHILS NFR BLD AUTO: 71.6 % (ref 42.7–76)
PLATELET # BLD AUTO: 201 10*3/MM3 (ref 140–450)
PMV BLD AUTO: 10.1 FL (ref 6–12)
RBC # BLD AUTO: 4.32 10*6/MM3 (ref 3.77–5.28)
WBC NRBC COR # BLD: 2.95 10*3/MM3 (ref 3.4–10.8)

## 2023-05-18 PROCEDURE — 85025 COMPLETE CBC W/AUTO DIFF WBC: CPT

## 2023-05-18 PROCEDURE — G0463 HOSPITAL OUTPT CLINIC VISIT: HCPCS | Performed by: INTERNAL MEDICINE

## 2023-05-18 PROCEDURE — 36415 COLL VENOUS BLD VENIPUNCTURE: CPT

## 2023-05-18 NOTE — PROGRESS NOTES
Chief Complaint  FOLLOW UP    Ling Ignacio, AP*  Ling Ignacio, APRN      Subjective      Diagnosis: Heterozygous Prothrombin mutation and Heterozygous Factor V Leiden mutation    H/o Lupus under management of Rheumatologist Dr. Patton    Leukopenia-active surveillance    Current Treatment: Eliquis BID-for thrombophilia    Previous Treatment: None        Eloisa Sims presents to [facility] for follow up on lab results and refill for Eliquis.     History of Present Illness   Ms. Sims was seen previously in consultation in July with Dr. Ch after developing a left upper leg DVT after a fall. She has had a previous clot in the right lower extremity. She was evaluated for genetic defects with the hypercoagulable state.     Factor II level was found to be heterozygous as well as factor V leiden was heterozygous. She also has lupus diagnosis and has not seen her rheumatology yet but scheduled for later in November. She is compliant with the daily Eliquis she is taking BID dosing. She reports her  PCP wants us to prescribe anti-coagulation.     Interval History: Patient presents for follow-up for management of anticoagulation given her history of DVT as well as diagnosis of heterozygous prothrombin mutation and heterozygous factor V Leiden mutation.  She is also here to discuss results of lab work obtained at last clinic appointment for leukopenia. Patient denies any blood clots since last clinic appointment.  She denies any blood loss or melena.  She reports tolerating Eliquis and compliance with this medication.  She reports recent ear infection which resolved after receiving antibiotics. She denies any fever, chills, night sweats, unintentional weight loss. She says she has been informed of having chronic low white blood cell count by her previous hematologist. Pt reports upcoming appointment with her rheumatologist in June 2023 to address lupus and  evaluation for possible Sjogren's.       Oncology/Hematology History    No history exists.       Review of Systems   Constitutional: Positive for fatigue (6/10). Negative for appetite change, diaphoresis, fever, unexpected weight gain and unexpected weight loss.   HENT: Negative.  Negative for hearing loss, mouth sores, sore throat, swollen glands, trouble swallowing and voice change.    Eyes: Negative.  Negative for blurred vision.   Respiratory: Negative.  Negative for cough, shortness of breath and wheezing.    Cardiovascular: Negative.  Negative for chest pain and palpitations.   Gastrointestinal: Negative.  Negative for abdominal pain, blood in stool, constipation, diarrhea, nausea and vomiting.   Endocrine: Negative.  Negative for cold intolerance and heat intolerance.   Genitourinary: Negative.  Negative for difficulty urinating, dysuria, frequency, hematuria and urinary incontinence.   Musculoskeletal: Negative.  Negative for arthralgias, back pain and myalgias.   Skin: Negative for rash, skin lesions and wound.   Allergic/Immunologic: Negative.    Neurological: Negative.  Negative for dizziness, seizures, weakness, numbness and headache.   Hematological: Negative.  Does not bruise/bleed easily.   Psychiatric/Behavioral: Negative.  Negative for depressed mood. The patient is not nervous/anxious.    All other systems reviewed and are negative.      Current Outpatient Medications on File Prior to Visit   Medication Sig Dispense Refill   • apixaban (ELIQUIS) 5 MG tablet tablet Take 1 tablet by mouth 2 (Two) Times a Day. 60 tablet 5   • azithromycin (Zithromax Z-Damian) 250 MG tablet Take 2 tablets by mouth on day 1, then 1 tablet daily on days 2-5 6 tablet 0   • brompheniramine-pseudoephedrine-DM 30-2-10 MG/5ML syrup Take 5 mL by mouth 4 (Four) Times a Day As Needed for Congestion or Cough. 118 mL 0   • buPROPion XL (Wellbutrin XL) 150 MG 24 hr tablet Take 1 tablet by mouth Daily. 90 tablet 1   • clonazePAM  (KlonoPIN) 1 MG tablet Take 1 tablet by mouth Daily As Needed for Anxiety. 30 tablet 0   • doxycycline (VIBRAMYCIN) 100 MG capsule Take 1 capsule by mouth 2 (Two) Times a Day. 20 capsule 0   • folic acid (FOLVITE) 1 MG tablet Take 1 tablet by mouth Daily. 90 tablet 1   • Hydroxychloroquine Sulfate (PLAQUENIL PO) Take 200 mg by mouth Daily.     • methylPREDNISolone (MEDROL) 4 MG dose pack Take as directed on package instructions. 1 each 0   • Milnacipran HCl (Savella) 12.5 MG tablet Take 90 mg by mouth 2 (Two) Times a Day. 180 tablet 1   • triamcinolone (KENALOG) 0.5 % cream      • vitamin D (ERGOCALCIFEROL) 1.25 MG (32109 UT) capsule capsule Take 1 capsule by mouth 1 (One) Time Per Week.     • zolpidem CR (Ambien CR) 12.5 MG CR tablet Take 1 tablet by mouth At Night As Needed for Sleep. 90 tablet 0     No current facility-administered medications on file prior to visit.       Allergies   Allergen Reactions   • Penicillins Hives   • Sulfa Antibiotics Hives     Past Medical History:   Diagnosis Date   • Anxiety YOLIE-    • Deep vein thrombosis    • Fibromyalgia    • Fibromyalgia, primary    • Insomnia    • SLE (systemic lupus erythematosus)      Past Surgical History:   Procedure Laterality Date   •  SECTION     • TONSILLECTOMY       Social History     Socioeconomic History   • Marital status:    Tobacco Use   • Smoking status: Never   • Smokeless tobacco: Never   Substance and Sexual Activity   • Alcohol use: Yes     Comment: social/rare drinker of wine   • Drug use: Never   • Sexual activity: Yes     Partners: Male     Comment:  had vasectomy     Family History   Problem Relation Age of Onset   • Anxiety disorder Mother    • Hyperlipidemia Father    • Cancer Paternal Grandmother    • Hyperlipidemia Paternal Grandmother        There is no immunization history on file for this patient.    Objective   Physical Exam  Vitals reviewed. Exam conducted with a chaperone present.  "  Constitutional:       Appearance: Normal appearance. She is normal weight.   HENT:      Head: Normocephalic.   Eyes:      Pupils: Pupils are equal, round, and reactive to light.   Pulmonary:      Effort: Pulmonary effort is normal.   Musculoskeletal:         General: Normal range of motion.      Cervical back: Normal range of motion and neck supple.   Neurological:      General: No focal deficit present.      Mental Status: She is alert and oriented to person, place, and time.         Vitals:    05/18/23 1120   BP: 127/73   Pulse: 96   Resp: 18   Temp: 97.4 °F (36.3 °C)   TempSrc: Temporal   SpO2: 100%   Weight: 72.1 kg (158 lb 15.2 oz)   Height: 162.6 cm (64.02\")   PainSc: Comment: NO VALUE     ECOG score: 0         ECOG: (0) Fully Active - Able to Carry On All Pre-disease Performance Without Restriction  Fall Risk Assessment was completed, and patient is at low risk for falls.  PHQ-9 Total Score: 0       The patient is  experiencing fatigue. Fatigue score: 7          Result Review :   The following data was reviewed by: Ray Judge MD on 09/07/2022:  Lab Results   Component Value Date    HGB 12.0 05/18/2023    HCT 35.8 05/18/2023    MCV 82.9 05/18/2023     05/18/2023    WBC 2.95 (L) 05/18/2023    NEUTROABS 2.11 05/18/2023    LYMPHSABS 0.50 (L) 05/18/2023    MONOSABS 0.31 05/18/2023    EOSABS 0.02 05/18/2023    BASOSABS 0.01 05/18/2023     Lab Results   Component Value Date    GLUCOSE 94 04/12/2023    BUN 14 04/12/2023    CREATININE 0.79 04/12/2023     04/12/2023    K 3.7 04/12/2023     (H) 04/12/2023    CO2 24.6 04/12/2023    CALCIUM 9.0 04/12/2023    PROTEINTOT 7.1 04/12/2023    ALBUMIN 4.4 04/12/2023    ALBUMIN 3.9 04/12/2023    BILITOT 0.2 04/12/2023    ALKPHOS 83 04/12/2023    AST 14 04/12/2023    ALT 13 04/12/2023          Assessment and Plan    Diagnoses and all orders for this visit:    1. Factor V Leiden (Primary)  -     CBC & Differential; Future  -     Comprehensive Metabolic " Panel; Future    2. Leukopenia, unspecified type  -     CBC & Differential; Future  -     Comprehensive Metabolic Panel; Future    3. Prothrombin gene mutation         She is positive for heterozygous prothrombin gene mutation as well as heterozygous FVL. Given her history of 2 DVTs, along with history of heterozygous prothrombin gene mutation as well as heterozygous factor V Leiden mutation, I would recommend lifelong anti-coagulation with Eliquis.   She reports her PCP would like for us to refill her Eliquis.   Eliquis refills have been sent to her pharmacy at [facility].     Leukopenia: Per lab work in our system it appears patient has history of low white blood cell count has been occurring for several years.  Patient with some occasional infections during lupus flares, but without any other B symptoms.  Results of flow cytometry obtained on peripheral blood at previous clinic appointment revealed B cells that show nonspecific light chain binding.  Thus, obtained lab work to assess levels of immunoglobulins as well as SPEP, free light chains, immunofixation (SPEP was negative for M spike, K/L FLC ratio was 1.26- normal) since this work-up was unremarkable suspect could be secondary to lupus medications prescribed by rheumatologist and/or secondary to her autoimmune conditions. Also, immunoglobulin levels form 4/12/23 were within normal limits. Recommend pt follow up with her Rheumatologist as scheduled in June 2023.    Plan for patient to follow-up in 3 months to recheck lab values with CBC and CMP.     Would consider BMBx if pt with worsening cytopenias on repeat blood work.     Patient verbalized understanding and agreement with plan.    Electronically signed by Ray Judge MD, 05/18/23, 12:00 PM EDT.      I spent 30 minutes caring for Eloisa on this date of service. This time includes time spent by me in the following activities:preparing for the visit, reviewing tests, obtaining and/or reviewing a  separately obtained history, performing a medically appropriate examination and/or evaluation , counseling and educating the patient/family/caregiver, ordering medications, tests, or procedures, referring and communicating with other health care professionals , documenting information in the medical record, independently interpreting results and communicating that information with the patient/family/caregiver and care coordination    Patient Follow Up: 1 month    Patient was given instructions and counseling regarding her condition or for health maintenance advice. Please see specific information pulled into the AVS if appropriate.           Ray Judge MD    5/18/2023

## 2023-05-30 ENCOUNTER — TELEPHONE (OUTPATIENT)
Dept: ONCOLOGY | Facility: HOSPITAL | Age: 38
End: 2023-05-30

## 2023-05-30 NOTE — TELEPHONE ENCOUNTER
----- Message from Ray Judge MD sent at 5/19/2023  8:42 AM EDT -----  Regarding: labs  Please let pt know that her WBC improved from last check and her other blood counts are stable. Plan to have her follow up as scheduled with me. Thanks.   ----- Message -----  From: Lab, Background User  Sent: 5/18/2023  11:15 AM EDT  To: Ray Judge MD

## 2023-05-30 NOTE — TELEPHONE ENCOUNTER
Spoke with patient, advised that labs from most recent visit are normal. Instructed to maintain all follow up appointments and contact the office with any questions or concerns. Patient verbalized understanding of all information discussed.

## 2023-06-07 ENCOUNTER — OFFICE VISIT (OUTPATIENT)
Dept: FAMILY MEDICINE CLINIC | Facility: CLINIC | Age: 38
End: 2023-06-07
Payer: OTHER GOVERNMENT

## 2023-06-07 ENCOUNTER — TELEPHONE (OUTPATIENT)
Dept: FAMILY MEDICINE CLINIC | Facility: CLINIC | Age: 38
End: 2023-06-07

## 2023-06-07 ENCOUNTER — LAB (OUTPATIENT)
Dept: LAB | Facility: HOSPITAL | Age: 38
End: 2023-06-07
Payer: OTHER GOVERNMENT

## 2023-06-07 VITALS
WEIGHT: 162 LBS | BODY MASS INDEX: 27.66 KG/M2 | HEIGHT: 64 IN | HEART RATE: 115 BPM | OXYGEN SATURATION: 100 % | DIASTOLIC BLOOD PRESSURE: 80 MMHG | SYSTOLIC BLOOD PRESSURE: 139 MMHG

## 2023-06-07 DIAGNOSIS — L65.9 HAIR LOSS: ICD-10-CM

## 2023-06-07 DIAGNOSIS — Z00.00 ANNUAL PHYSICAL EXAM: ICD-10-CM

## 2023-06-07 DIAGNOSIS — F41.9 ANXIETY: ICD-10-CM

## 2023-06-07 DIAGNOSIS — Z00.00 ANNUAL PHYSICAL EXAM: Primary | ICD-10-CM

## 2023-06-07 DIAGNOSIS — G47.00 INSOMNIA, UNSPECIFIED TYPE: ICD-10-CM

## 2023-06-07 DIAGNOSIS — F32.A DEPRESSION, UNSPECIFIED DEPRESSION TYPE: ICD-10-CM

## 2023-06-07 LAB
CHOLEST SERPL-MCNC: 143 MG/DL (ref 0–200)
HDLC SERPL-MCNC: 47 MG/DL (ref 40–60)
LDLC SERPL CALC-MCNC: 79 MG/DL (ref 0–100)
LDLC/HDLC SERPL: 1.65 {RATIO}
T4 FREE SERPL-MCNC: 1 NG/DL (ref 0.93–1.7)
TRIGL SERPL-MCNC: 93 MG/DL (ref 0–150)
TSH SERPL DL<=0.05 MIU/L-ACNC: 3.48 UIU/ML (ref 0.27–4.2)
VLDLC SERPL-MCNC: 17 MG/DL (ref 5–40)

## 2023-06-07 PROCEDURE — 84443 ASSAY THYROID STIM HORMONE: CPT

## 2023-06-07 PROCEDURE — 84439 ASSAY OF FREE THYROXINE: CPT

## 2023-06-07 PROCEDURE — 36415 COLL VENOUS BLD VENIPUNCTURE: CPT

## 2023-06-07 PROCEDURE — 80061 LIPID PANEL: CPT

## 2023-06-07 RX ORDER — DARIDOREXANT 50 MG/1
50 TABLET, FILM COATED ORAL NIGHTLY
Qty: 30 TABLET | Refills: 2 | Status: SHIPPED | OUTPATIENT
Start: 2023-06-07

## 2023-06-07 RX ORDER — CLONAZEPAM 1 MG/1
1 TABLET ORAL DAILY PRN
Qty: 30 TABLET | Refills: 0 | Status: SHIPPED | OUTPATIENT
Start: 2023-06-07

## 2023-06-07 RX ORDER — ZOLPIDEM TARTRATE 12.5 MG/1
12.5 TABLET, FILM COATED, EXTENDED RELEASE ORAL NIGHTLY PRN
Qty: 90 TABLET | Refills: 0 | Status: CANCELLED | OUTPATIENT
Start: 2023-06-07

## 2023-06-07 RX ORDER — DARIDOREXANT 25 MG/1
TABLET, FILM COATED ORAL
Status: CANCELLED | OUTPATIENT
Start: 2023-06-07

## 2023-06-07 RX ORDER — BUPROPION HYDROCHLORIDE 150 MG/1
150 TABLET ORAL DAILY
Qty: 90 TABLET | Refills: 1 | Status: SHIPPED | OUTPATIENT
Start: 2023-06-07

## 2023-06-07 NOTE — PROGRESS NOTES
Chief Complaint  Follow-up insomnia, anxiety and depression, annual exam, and check ears    SUBJECTIVE  Eloisa Sims presents to [facility] patient presents today to follow-up on chronic conditions, would like her ears checked, annual exam, and would like to discuss her insomnia medication.  Patient states that she is not able to fall asleep with the Ambien CR, taking several hours.    PT SEEING HEMATOLOGY -states they did a lot of work-up recently, her white blood cell count had gotten quite a bit lower, but that has now started to improve a bit.  Patient reports that most of the testing came back okay, states that she was doing some of this evaluation due to the fact patient suddenly started having a lot of hair loss, states that it came out in chunks, states that it is starting to regrow in those areas, states the loss was pretty significant.    History of Present Illness  Past Medical History:   Diagnosis Date    Anxiety YOLIE- 2015    Deep vein thrombosis     Fibromyalgia     Fibromyalgia, primary 2019    Insomnia     SLE (systemic lupus erythematosus)       Family History   Problem Relation Age of Onset    Anxiety disorder Mother     Hyperlipidemia Father     Cancer Paternal Grandmother     Hyperlipidemia Paternal Grandmother       Past Surgical History:   Procedure Laterality Date     SECTION      TONSILLECTOMY          Current Outpatient Medications:     apixaban (ELIQUIS) 5 MG tablet tablet, Take 1 tablet by mouth 2 (Two) Times a Day., Disp: 60 tablet, Rfl: 5    buPROPion XL (Wellbutrin XL) 150 MG 24 hr tablet, Take 1 tablet by mouth Daily., Disp: 90 tablet, Rfl: 1    clonazePAM (KlonoPIN) 1 MG tablet, Take 1 tablet by mouth Daily As Needed for Anxiety., Disp: 30 tablet, Rfl: 0    folic acid (FOLVITE) 1 MG tablet, Take 1 tablet by mouth Daily., Disp: 90 tablet, Rfl: 1    Hydroxychloroquine Sulfate (PLAQUENIL PO), Take 200 mg by mouth Daily., Disp: ,  "Rfl:     Milnacipran HCl (Savella) 12.5 MG tablet, Take 90 mg by mouth 2 (Two) Times a Day., Disp: 180 tablet, Rfl: 1    triamcinolone (KENALOG) 0.5 % cream, , Disp: , Rfl:     vitamin D (ERGOCALCIFEROL) 1.25 MG (02030 UT) capsule capsule, Take 1 capsule by mouth 1 (One) Time Per Week., Disp: , Rfl:     zolpidem CR (Ambien CR) 12.5 MG CR tablet, Take 1 tablet by mouth At Night As Needed for Sleep., Disp: 90 tablet, Rfl: 0    Daridorexant HCl (Quviviq) 50 MG tablet, Take 1 tablet by mouth Every Night., Disp: 30 tablet, Rfl: 2    OBJECTIVE  Vital Signs:   /80   Pulse 115   Ht 162.6 cm (64.02\")   Wt 73.5 kg (162 lb)   LMP 05/12/2023 (Approximate)   SpO2 100%   BMI 27.79 kg/m²    Estimated body mass index is 27.79 kg/m² as calculated from the following:    Height as of this encounter: 162.6 cm (64.02\").    Weight as of this encounter: 73.5 kg (162 lb).     Wt Readings from Last 3 Encounters:   06/07/23 73.5 kg (162 lb)   05/18/23 72.1 kg (158 lb 15.2 oz)   05/02/23 71.6 kg (157 lb 12.8 oz)     BP Readings from Last 3 Encounters:   06/07/23 139/80   05/18/23 127/73   05/02/23 127/90       Physical Exam  Vitals reviewed.   Constitutional:       General: She is not in acute distress.     Appearance: Normal appearance. She is well-developed. She is not diaphoretic.   HENT:      Head: Normocephalic and atraumatic. Hair is normal.      Comments: Evaluation of scalp reveals erythema and overall, multiple scattered patches that appears to have had previous hair loss, patient has approximately half inch of new hair growth in these areas.     Right Ear: Hearing, tympanic membrane, ear canal and external ear normal. No decreased hearing noted. No drainage.      Left Ear: Hearing, tympanic membrane, ear canal and external ear normal. No decreased hearing noted.      Nose: Nose normal. No nasal deformity.      Mouth/Throat:      Mouth: Mucous membranes are moist.   Eyes:      General: Lids are normal.         Right eye: " No discharge.         Left eye: No discharge.      Extraocular Movements: Extraocular movements intact.      Conjunctiva/sclera: Conjunctivae normal.      Pupils: Pupils are equal, round, and reactive to light.   Neck:      Thyroid: No thyromegaly.      Vascular: No JVD.   Cardiovascular:      Rate and Rhythm: Normal rate and regular rhythm.      Pulses: Normal pulses.      Heart sounds: Normal heart sounds. No murmur heard.    No friction rub. No gallop.   Pulmonary:      Effort: Pulmonary effort is normal. No respiratory distress.      Breath sounds: Normal breath sounds. No wheezing or rales.   Chest:      Chest wall: No tenderness.   Abdominal:      General: Bowel sounds are normal. There is no distension.      Palpations: Abdomen is soft. There is no mass.      Tenderness: There is no abdominal tenderness. There is no guarding or rebound.      Hernia: No hernia is present.   Musculoskeletal:         General: No tenderness or deformity. Normal range of motion.      Cervical back: Normal range of motion and neck supple.   Lymphadenopathy:      Cervical: No cervical adenopathy.   Skin:     General: Skin is warm and dry.      Findings: No erythema or rash.   Neurological:      Mental Status: She is alert and oriented to person, place, and time.      Cranial Nerves: No cranial nerve deficit.      Motor: No abnormal muscle tone.      Coordination: Coordination normal.      Deep Tendon Reflexes: Reflexes are normal and symmetric. Reflexes normal.   Psychiatric:         Mood and Affect: Mood normal.         Behavior: Behavior normal.         Thought Content: Thought content normal.         Judgment: Judgment normal.        Result Review        No Images in the past 120 days found..     The above data has been reviewed by TIERRA Sharma 06/07/2023 10:09 EDT.          Patient Care Team:  Ling Ignacio APRN as PCP - General (Nurse Practitioner)  Ray Judge MD as Consulting Physician (Hematology and  Oncology)    BMI is >= 25 and <30. (Overweight) The following options were offered after discussion;: exercise counseling/recommendations and nutrition counseling/recommendations       ASSESSMENT & PLAN    Diagnoses and all orders for this visit:    1. Annual physical exam (Primary)  -     TSH; Future  -     Lipid Panel; Future  -     T4, free; Future    2. Anxiety  Overview:  Well-controlled with PRN use Klonopin, continue current dose    Orders:  -     clonazePAM (KlonoPIN) 1 MG tablet; Take 1 tablet by mouth Daily As Needed for Anxiety.  Dispense: 30 tablet; Refill: 0    3. Insomnia, unspecified type  Assessment & Plan:    We previously changed her Ambien which she had been on for some time to Ambien CR due to patient having difficulty staying asleep, states that it did help with her staying asleep, but she is now having great difficulty in falling asleep, states after she takes the medication it still takes 2 or 3 hours to fall asleep.  Patient has previously been on Lunesta which gave her a very metallic taste in her mouth.  After discussion we had decided to trial q. visit, side effects administration medication discussed, we will follow in 3 months for reevaluation.    Orders:  -     Daridorexant HCl (Quviviq) 50 MG tablet; Take 1 tablet by mouth Every Night.  Dispense: 30 tablet; Refill: 2    4. Depression, unspecified depression type  Comments:  Well-controlled with Wellbutrin, continue current dose  Orders:  -     buPROPion XL (Wellbutrin XL) 150 MG 24 hr tablet; Take 1 tablet by mouth Daily.  Dispense: 90 tablet; Refill: 1    5. Hair loss  Comments:  Discussed multiple possible causes including patient's lupus, refer to Derm for further eval, check labs  Orders:  -     Ambulatory Referral to Dermatology  -     TSH; Future  -     T4, free; Future         Tobacco Use: Low Risk     Smoking Tobacco Use: Never    Smokeless Tobacco Use: Never    Passive Exposure: Not on file     The patient is advised to reduce  exposure to stress, continue current medications, continue current healthy lifestyle patterns, and return for routine annual checkups.    Follow Up     Return in about 3 months (around 9/7/2023), or if symptoms worsen or fail to improve.        Patient was given instructions and counseling regarding her condition or for health maintenance advice. Please see specific information pulled into the AVS if appropriate.   I have reviewed information obtained and documented by others and I have confirmed the accuracy of this documented note.    Ling Ignacio APRN

## 2023-06-07 NOTE — ASSESSMENT & PLAN NOTE
We previously changed her Ambien which she had been on for some time to Ambien CR due to patient having difficulty staying asleep, states that it did help with her staying asleep, but she is now having great difficulty in falling asleep, states after she takes the medication it still takes 2 or 3 hours to fall asleep.  Patient has previously been on Lunesta which gave her a very metallic taste in her mouth.  After discussion we had decided to trial q. visit, side effects administration medication discussed, we will follow in 3 months for reevaluation.

## 2023-06-07 NOTE — TELEPHONE ENCOUNTER
Provider: LUCAS PADILLA  Caller: RONNI SHI  Relationship to Patient:   Pharmacy: [id number] - [place], [place] - [address] AT [facility] - [phone] [facility] [phone]  [phone]   Phone Number: [phone]   Reason for Call: CALLER STATES THAT Daridorexant HCl (Quviviq) 50 MG tablet  NEEDS A PRIOR AUTHORIZATION.    PLEASE CALL AND ADVISE

## 2023-06-08 ENCOUNTER — TELEPHONE (OUTPATIENT)
Dept: FAMILY MEDICINE CLINIC | Facility: CLINIC | Age: 38
End: 2023-06-08
Payer: OTHER GOVERNMENT

## 2023-06-08 NOTE — TELEPHONE ENCOUNTER
Caller: Eloisa Sims    Relationship: Self    Best call back number: [phone]    What specialty or service is being requested: HEMATOLOGY AND RHEUMATOLOGY    Any additional details: PATIENT IS NEEDING UPDATED REFERRALS FROM LAST YEAR.

## 2023-06-19 ENCOUNTER — TELEPHONE (OUTPATIENT)
Dept: FAMILY MEDICINE CLINIC | Facility: CLINIC | Age: 38
End: 2023-06-19
Payer: OTHER GOVERNMENT

## 2023-06-19 NOTE — TELEPHONE ENCOUNTER
Daridorexant HCl (Quviviq) 50 MG tablet [[id number]] (Order [id number])   Patient called and is wanting to know that status of this prescription. She knows she was waiting for a pa and insurance denied it. So now she is wanting to know what to do about the medication.

## 2023-06-19 NOTE — TELEPHONE ENCOUNTER
CALLED LEFT PT DETAILED MESSAGE PER COVER MY MEDS PA SUBMITTED ON 06/09/23 AND INSURANCE DENIED ADVISED PT TO CALL  SEE WHAT MEDICATION THEY WILL COVER AND TO CALL OFFICE BACK

## 2023-08-08 ENCOUNTER — TELEPHONE (OUTPATIENT)
Dept: FAMILY MEDICINE CLINIC | Facility: CLINIC | Age: 38
End: 2023-08-08
Payer: OTHER GOVERNMENT

## 2023-08-08 ENCOUNTER — PATIENT MESSAGE (OUTPATIENT)
Dept: FAMILY MEDICINE CLINIC | Facility: CLINIC | Age: 38
End: 2023-08-08
Payer: OTHER GOVERNMENT

## 2023-08-08 DIAGNOSIS — G47.00 INSOMNIA, UNSPECIFIED TYPE: Primary | ICD-10-CM

## 2023-08-08 RX ORDER — RAMELTEON 8 MG/1
8 TABLET ORAL NIGHTLY
Qty: 30 TABLET | Refills: 0 | Status: SHIPPED | OUTPATIENT
Start: 2023-08-08

## 2023-08-08 NOTE — TELEPHONE ENCOUNTER
Patient has tried and failed multiple sleep medications including Ambien, Ambien CR, Lunesta,, and now Sonata, at this point I recommend referral to sleep specialist for further evaluation, I have placed the referral, I have also sent in a prescription of the Ramelteon that was one of the approved medications from your insurance, we can trial this in the meantime.  Discontinue your zaleplon and try the ramelteon instead

## 2023-08-08 NOTE — TELEPHONE ENCOUNTER
----- Message from Edith Junior MA sent at 8/8/2023  8:59 AM EDT -----  Regarding: FW: Zaleplon update  Contact: [phone]  Pt not sleeping well with Zaleplon, please advise   ----- Message -----  From: Eloisa Sims  Sent: 8/8/2023   7:22 AM EDT  To: [facility]  Subject: Zaleplon update                                  The medication does not seem to be working anymore. I get good sleep for about 2/3 hours and then start waking up every hour or so and toss and turn all night.  The tossing and turning is the worse because they I wake up stiff and tense.  Please advise    Eloisa

## 2023-08-17 ENCOUNTER — LAB (OUTPATIENT)
Dept: ONCOLOGY | Facility: HOSPITAL | Age: 38
End: 2023-08-17
Payer: OTHER GOVERNMENT

## 2023-08-17 DIAGNOSIS — D68.51 FACTOR V LEIDEN: ICD-10-CM

## 2023-08-17 DIAGNOSIS — D72.819 LEUKOPENIA, UNSPECIFIED TYPE: ICD-10-CM

## 2023-08-17 LAB
ALBUMIN SERPL-MCNC: 4.2 G/DL (ref 3.5–5.2)
ALBUMIN/GLOB SERPL: 1.6 G/DL
ALP SERPL-CCNC: 95 U/L (ref 39–117)
ALT SERPL W P-5'-P-CCNC: 13 U/L (ref 1–33)
ANION GAP SERPL CALCULATED.3IONS-SCNC: 9.8 MMOL/L (ref 5–15)
AST SERPL-CCNC: 14 U/L (ref 1–32)
BASOPHILS # BLD AUTO: 0.01 10*3/MM3 (ref 0–0.2)
BASOPHILS NFR BLD AUTO: 0.4 % (ref 0–1.5)
BILIRUB SERPL-MCNC: <0.2 MG/DL (ref 0–1.2)
BUN SERPL-MCNC: 12 MG/DL (ref 6–20)
BUN/CREAT SERPL: 14.6 (ref 7–25)
CALCIUM SPEC-SCNC: 8.9 MG/DL (ref 8.6–10.5)
CHLORIDE SERPL-SCNC: 105 MMOL/L (ref 98–107)
CO2 SERPL-SCNC: 26.2 MMOL/L (ref 22–29)
CREAT SERPL-MCNC: 0.82 MG/DL (ref 0.57–1)
DEPRECATED RDW RBC AUTO: 40.9 FL (ref 37–54)
EGFRCR SERPLBLD CKD-EPI 2021: 94.6 ML/MIN/1.73
EOSINOPHIL # BLD AUTO: 0.03 10*3/MM3 (ref 0–0.4)
EOSINOPHIL NFR BLD AUTO: 1.3 % (ref 0.3–6.2)
ERYTHROCYTE [DISTWIDTH] IN BLOOD BY AUTOMATED COUNT: 13.2 % (ref 12.3–15.4)
GLOBULIN UR ELPH-MCNC: 2.6 GM/DL
GLUCOSE SERPL-MCNC: 96 MG/DL (ref 65–99)
HCT VFR BLD AUTO: 36.1 % (ref 34–46.6)
HGB BLD-MCNC: 11.9 G/DL (ref 12–15.9)
IMM GRANULOCYTES # BLD AUTO: 0.01 10*3/MM3 (ref 0–0.05)
IMM GRANULOCYTES NFR BLD AUTO: 0.4 % (ref 0–0.5)
LYMPHOCYTES # BLD AUTO: 0.53 10*3/MM3 (ref 0.7–3.1)
LYMPHOCYTES NFR BLD AUTO: 22.6 % (ref 19.6–45.3)
MCH RBC QN AUTO: 27.4 PG (ref 26.6–33)
MCHC RBC AUTO-ENTMCNC: 33 G/DL (ref 31.5–35.7)
MCV RBC AUTO: 83.2 FL (ref 79–97)
MONOCYTES # BLD AUTO: 0.35 10*3/MM3 (ref 0.1–0.9)
MONOCYTES NFR BLD AUTO: 15 % (ref 5–12)
NEUTROPHILS NFR BLD AUTO: 1.41 10*3/MM3 (ref 1.7–7)
NEUTROPHILS NFR BLD AUTO: 60.3 % (ref 42.7–76)
PLATELET # BLD AUTO: 210 10*3/MM3 (ref 140–450)
PMV BLD AUTO: 10.1 FL (ref 6–12)
POTASSIUM SERPL-SCNC: 3.6 MMOL/L (ref 3.5–5.2)
PROT SERPL-MCNC: 6.8 G/DL (ref 6–8.5)
RBC # BLD AUTO: 4.34 10*6/MM3 (ref 3.77–5.28)
SODIUM SERPL-SCNC: 141 MMOL/L (ref 136–145)
WBC NRBC COR # BLD: 2.34 10*3/MM3 (ref 3.4–10.8)

## 2023-08-17 PROCEDURE — 36415 COLL VENOUS BLD VENIPUNCTURE: CPT

## 2023-08-17 PROCEDURE — 85025 COMPLETE CBC W/AUTO DIFF WBC: CPT

## 2023-08-17 PROCEDURE — 80053 COMPREHEN METABOLIC PANEL: CPT

## 2023-08-18 ENCOUNTER — OFFICE VISIT (OUTPATIENT)
Dept: ONCOLOGY | Facility: HOSPITAL | Age: 38
End: 2023-08-18
Payer: OTHER GOVERNMENT

## 2023-08-18 VITALS
SYSTOLIC BLOOD PRESSURE: 132 MMHG | DIASTOLIC BLOOD PRESSURE: 87 MMHG | WEIGHT: 158.73 LBS | HEART RATE: 96 BPM | TEMPERATURE: 97.5 F | BODY MASS INDEX: 27.1 KG/M2 | HEIGHT: 64 IN | RESPIRATION RATE: 18 BRPM | OXYGEN SATURATION: 100 %

## 2023-08-18 DIAGNOSIS — D72.819 LEUKOPENIA, UNSPECIFIED TYPE: Primary | ICD-10-CM

## 2023-08-18 DIAGNOSIS — D68.51 FACTOR V LEIDEN: ICD-10-CM

## 2023-08-18 DIAGNOSIS — M32.9 SYSTEMIC LUPUS ERYTHEMATOSUS, UNSPECIFIED SLE TYPE, UNSPECIFIED ORGAN INVOLVEMENT STATUS: ICD-10-CM

## 2023-08-18 PROCEDURE — G0463 HOSPITAL OUTPT CLINIC VISIT: HCPCS | Performed by: INTERNAL MEDICINE

## 2023-08-18 RX ORDER — FLUOCINONIDE TOPICAL SOLUTION USP, 0.05% 0.5 MG/ML
SOLUTION TOPICAL
COMMUNITY
Start: 2023-06-24

## 2023-08-18 RX ORDER — CLINDAMYCIN PHOSPHATE 11.9 MG/ML
SOLUTION TOPICAL
COMMUNITY
Start: 2023-08-03

## 2023-08-18 NOTE — PROGRESS NOTES
Chief Complaint  SYSTEMIC LUPUS ERYTHEMATOSUS, UNSPECIFIED SLE TYPE     Ling Ignacio, AP*  Ling Ignacio, APRN      Subjective      Diagnosis: Heterozygous Prothrombin mutation and Heterozygous Factor V Leiden mutation    H/o Lupus under management of Rheumatologist Dr. Patton    Leukopenia-active surveillance    Current Treatment: Eliquis BID-for thrombophilia    Previous Treatment: None        Eloisa Sims presents to [facility] for follow up on lab results and refill for Eliquis.     History of Present Illness   Ms. Sims was seen previously in consultation in July with Dr. Ch after developing a left upper leg DVT after a fall. She has had a previous clot in the right lower extremity. She was evaluated for genetic defects with the hypercoagulable state.     Factor II level was found to be heterozygous as well as factor V leiden was heterozygous. She also has lupus diagnosis and has not seen her rheumatology yet but scheduled for later in November. She is compliant with the daily Eliquis she is taking BID dosing. She reports her  PCP wants us to prescribe anti-coagulation.     Interval History: Patient presents for follow-up for management of anticoagulation given her history of DVT as well as diagnosis of heterozygous prothrombin mutation and heterozygous factor V Leiden mutation.  Patient denies any blood clots since last clinic appointment.  She denies any blood loss or melena.  She reports tolerating Eliquis and compliance with this medication.  No report of recent infections. She denies any fever, chills, night sweats, unintentional weight loss. She says she has been informed of having chronic low white blood cell count by her previous hematologist. Pt reports upcoming appointment with her rheumatologist to address lupus and evaluation for possible Sjogren's. Pt continues to take Plaquenil for lupus and denies any new medications.        Oncology/Hematology History    No history exists.       Review of Systems   Constitutional:  Positive for fatigue (6/10). Negative for appetite change, diaphoresis, fever, unexpected weight gain and unexpected weight loss.   HENT: Negative.  Negative for hearing loss, mouth sores, sore throat, swollen glands, trouble swallowing and voice change.    Eyes: Negative.  Negative for blurred vision.   Respiratory: Negative.  Negative for cough, shortness of breath and wheezing.    Cardiovascular: Negative.  Negative for chest pain and palpitations.   Gastrointestinal: Negative.  Negative for abdominal pain, blood in stool, constipation, diarrhea, nausea and vomiting.   Endocrine: Negative.  Negative for cold intolerance and heat intolerance.   Genitourinary: Negative.  Negative for difficulty urinating, dysuria, frequency, hematuria and urinary incontinence.   Musculoskeletal: Negative.  Negative for arthralgias, back pain and myalgias.   Skin:  Negative for rash, skin lesions and wound.   Allergic/Immunologic: Negative.    Neurological: Negative.  Negative for dizziness, seizures, weakness, numbness and headache.   Hematological: Negative.  Does not bruise/bleed easily.   Psychiatric/Behavioral: Negative.  Negative for depressed mood. The patient is not nervous/anxious.    All other systems reviewed and are negative.    Current Outpatient Medications on File Prior to Visit   Medication Sig Dispense Refill    apixaban (ELIQUIS) 5 MG tablet tablet Take 1 tablet by mouth 2 (Two) Times a Day. 60 tablet 5    buPROPion XL (Wellbutrin XL) 150 MG 24 hr tablet Take 1 tablet by mouth Daily. 90 tablet 1    clindamycin (CLEOCIN T) 1 % external solution APPLY TOPICALLY TO THE SCALP EVERY DAY AS NEEDED      clonazePAM (KlonoPIN) 1 MG tablet Take 1 tablet by mouth Daily As Needed for Anxiety. 30 tablet 0    fluocinonide (LIDEX) 0.05 % external solution APPLY TO BALDING AREAS IN SCALP EVERY NIGHT AT BEDTIME      folic acid  (FOLVITE) 1 MG tablet Take 1 tablet by mouth Daily. 90 tablet 1    Hydroxychloroquine Sulfate (PLAQUENIL PO) Take 200 mg by mouth Daily.      Milnacipran HCl (Savella) 12.5 MG tablet Take 90 mg by mouth 2 (Two) Times a Day. 180 tablet 1    ramelteon (ROZEREM) 8 MG tablet Take 1 tablet by mouth Every Night. 30 tablet 0    triamcinolone (KENALOG) 0.5 % cream       vitamin D (ERGOCALCIFEROL) 1.25 MG (52176 UT) capsule capsule Take 1 capsule by mouth 1 (One) Time Per Week.      zaleplon (SONATA) 5 MG capsule Take 2 capsules by mouth Every Night. 60 capsule 1    zolpidem CR (Ambien CR) 12.5 MG CR tablet Take 1 tablet by mouth At Night As Needed for Sleep. (Patient not taking: Reported on 2023) 90 tablet 0     No current facility-administered medications on file prior to visit.       Allergies   Allergen Reactions    Penicillins Hives    Sulfa Antibiotics Hives     Past Medical History:   Diagnosis Date    Anxiety YOLIE-     Deep vein thrombosis     Fibromyalgia     Fibromyalgia, primary 2019    Insomnia     SLE (systemic lupus erythematosus)      Past Surgical History:   Procedure Laterality Date     SECTION      TONSILLECTOMY       Social History     Socioeconomic History    Marital status:    Tobacco Use    Smoking status: Never    Smokeless tobacco: Never   Substance and Sexual Activity    Alcohol use: Yes     Comment: social/rare drinker of wine    Drug use: Never    Sexual activity: Yes     Partners: Male     Comment:  had vasectomy     Family History   Problem Relation Age of Onset    Anxiety disorder Mother     Hyperlipidemia Father     Cancer Paternal Grandmother     Hyperlipidemia Paternal Grandmother        There is no immunization history on file for this patient.    Objective   Physical Exam  Vitals reviewed. Exam conducted with a chaperone present.   Constitutional:       Appearance: Normal appearance. She is normal weight.   HENT:      Head: Normocephalic.   Eyes:       "Pupils: Pupils are equal, round, and reactive to light.   Pulmonary:      Effort: Pulmonary effort is normal.   Musculoskeletal:         General: Normal range of motion.      Cervical back: Normal range of motion and neck supple.   Neurological:      General: No focal deficit present.      Mental Status: She is alert and oriented to person, place, and time.       Vitals:    08/18/23 1125   BP: 132/87   Pulse: 96   Resp: 18   Temp: 97.5 øF (36.4 øC)   TempSrc: Temporal   SpO2: 100%   Weight: 72 kg (158 lb 11.7 oz)   Height: 162.6 cm (64.02\")   PainSc: 0-No pain     ECOG score: 0         ECOG: (0) Fully Active - Able to Carry On All Pre-disease Performance Without Restriction  Fall Risk Assessment was completed, and patient is at low risk for falls.  PHQ-9 Total Score: 0       The patient is  experiencing fatigue. Fatigue score: 7          Result Review :   The following data was reviewed by: Ray Judge MD on 09/07/2022:  Lab Results   Component Value Date    HGB 11.9 (L) 08/17/2023    HCT 36.1 08/17/2023    MCV 83.2 08/17/2023     08/17/2023    WBC 2.34 (L) 08/17/2023    NEUTROABS 1.41 (L) 08/17/2023    LYMPHSABS 0.53 (L) 08/17/2023    MONOSABS 0.35 08/17/2023    EOSABS 0.03 08/17/2023    BASOSABS 0.01 08/17/2023     Lab Results   Component Value Date    GLUCOSE 96 08/17/2023    BUN 12 08/17/2023    CREATININE 0.82 08/17/2023     08/17/2023    K 3.6 08/17/2023     08/17/2023    CO2 26.2 08/17/2023    CALCIUM 8.9 08/17/2023    PROTEINTOT 6.8 08/17/2023    ALBUMIN 4.2 08/17/2023    BILITOT <0.2 08/17/2023    ALKPHOS 95 08/17/2023    AST 14 08/17/2023    ALT 13 08/17/2023          Assessment and Plan    Diagnoses and all orders for this visit:    1. Leukopenia, unspecified type (Primary)  -     CBC & Differential; Future  -     Comprehensive Metabolic Panel; Future    2. Factor V Leiden  -     CBC & Differential; Future  -     Comprehensive Metabolic Panel; Future    3. Systemic lupus " erythematosus, unspecified SLE type, unspecified organ involvement status  -     CBC & Differential; Future  -     Comprehensive Metabolic Panel; Future         She is positive for heterozygous prothrombin gene mutation as well as heterozygous FVL. Given her history of 2 DVTs, along with history of heterozygous prothrombin gene mutation as well as heterozygous factor V Leiden mutation, I would recommend lifelong anti-coagulation with Eliquis.   She reports her PCP would like for us to refill her Eliquis.   Eliquis refills have been sent to her pharmacy at [place].     Leukopenia: Discussed results of lab work obtained on 8/17/2023.  Most recent white blood cell count remains stable from previous values Per lab work in our system it appears patient has history of low white blood cell count has been occurring for several years.  Patient with some occasional infections during lupus flares, but without any other B symptoms.  Results of flow cytometry obtained on peripheral blood at previous clinic appointment revealed B cells that show nonspecific light chain binding.  Thus, obtained lab work to assess levels of immunoglobulins as well as SPEP, free light chains, immunofixation (SPEP was negative for M spike, K/L FLC ratio was 1.26- normal from 4/2023) since this work-up was unremarkable suspect could be secondary to lupus medications prescribed by rheumatologist and/or secondary to her autoimmune conditions. Also, immunoglobulin levels form 4/12/23 were within normal limits. Recommend pt follow up with her Rheumatologist as scheduled for continued management of lupus.    Plan for patient to follow-up in 3 months to recheck lab values with CBC and CMP.     Would consider BMBx if pt with worsening cytopenias on repeat blood work.     Patient verbalized understanding and agreement with plan.    Electronically signed by Ray Judge MD, 08/18/23, 12:46 PM EDT.      I spent 30 minutes caring for Eloisa on this date of  service. This time includes time spent by me in the following activities:preparing for the visit, reviewing tests, obtaining and/or reviewing a separately obtained history, performing a medically appropriate examination and/or evaluation , counseling and educating the patient/family/caregiver, ordering medications, tests, or procedures, referring and communicating with other health care professionals , documenting information in the medical record, independently interpreting results and communicating that information with the patient/family/caregiver and care coordination        Patient was given instructions and counseling regarding her condition or for health maintenance advice. Please see specific information pulled into the AVS if appropriate.           Ray Judge MD    8/18/2023

## 2023-08-25 ENCOUNTER — LAB (OUTPATIENT)
Dept: LAB | Facility: HOSPITAL | Age: 38
End: 2023-08-25
Payer: OTHER GOVERNMENT

## 2023-08-25 ENCOUNTER — OFFICE VISIT (OUTPATIENT)
Dept: SLEEP MEDICINE | Facility: HOSPITAL | Age: 38
End: 2023-08-25
Payer: OTHER GOVERNMENT

## 2023-08-25 VITALS
HEART RATE: 103 BPM | HEIGHT: 64 IN | SYSTOLIC BLOOD PRESSURE: 134 MMHG | BODY MASS INDEX: 27.02 KG/M2 | WEIGHT: 158.3 LBS | DIASTOLIC BLOOD PRESSURE: 83 MMHG | OXYGEN SATURATION: 100 %

## 2023-08-25 DIAGNOSIS — G25.81 RESTLESS LEGS SYNDROME (RLS): ICD-10-CM

## 2023-08-25 DIAGNOSIS — E61.1 IRON DEFICIENCY: ICD-10-CM

## 2023-08-25 DIAGNOSIS — F51.04 CHRONIC INSOMNIA: ICD-10-CM

## 2023-08-25 DIAGNOSIS — R06.83 SNORING: Primary | ICD-10-CM

## 2023-08-25 DIAGNOSIS — G47.19 EXCESSIVE DAYTIME SLEEPINESS: ICD-10-CM

## 2023-08-25 LAB
25(OH)D3 SERPL-MCNC: 26.9 NG/ML (ref 30–100)
FERRITIN SERPL-MCNC: 85.5 NG/ML (ref 13–150)
IRON 24H UR-MRATE: 46 MCG/DL (ref 37–145)
IRON SATN MFR SERPL: 13 % (ref 20–50)
TIBC SERPL-MCNC: 346 MCG/DL (ref 298–536)
TRANSFERRIN SERPL-MCNC: 232 MG/DL (ref 200–360)

## 2023-08-25 PROCEDURE — 82306 VITAMIN D 25 HYDROXY: CPT

## 2023-08-25 PROCEDURE — 84466 ASSAY OF TRANSFERRIN: CPT

## 2023-08-25 PROCEDURE — 36415 COLL VENOUS BLD VENIPUNCTURE: CPT

## 2023-08-25 PROCEDURE — 82728 ASSAY OF FERRITIN: CPT

## 2023-08-25 PROCEDURE — G0463 HOSPITAL OUTPT CLINIC VISIT: HCPCS | Performed by: FAMILY MEDICINE

## 2023-08-25 PROCEDURE — 83540 ASSAY OF IRON: CPT

## 2023-08-25 RX ORDER — MELATONIN
1 EVERY 24 HOURS
COMMUNITY

## 2023-08-25 NOTE — PROGRESS NOTES
[facility]  [address] [place] [address]  [place]   [place] [address]  Phone: [phone]  Fax: [phone]      Eloisa Sims  [id number]   1985  38 y.o.  female      Referring physician/provider   PCP Ling Ignacio APRN    Type of service: Initial Sleep Medicine Consult.  Date of service: 8/25/2023      Chief Complaint   Patient presents with    Sleeping Problem       History of present illness;  Thank you for asking to see Eloisa Sims,  who is a 38 y.o. w/ a PMHx of Anxiety, Fibromyalgia, SLE, Factor V Leiden Mutation. The patient was seen today on 8/25/2023 at [facility].  The patient presents today with symptoms of difficulty with sleep initiation, difficulty with sleep maintenance, snoring (per ), and non-restorative sleep. The symptoms are present for >1 year and they are persistent in nature. Patient has  prior surgery namely tonsillectomy in 1993, no nasal surgery or UPPP.      Obstructive Sleep Apnea Screening: STOP-BANG Sleep Apnea Questionnaire. Reference: Lionel F et al. Br J Anaesth, 2012.    Criterion    Yes    No  Do you SNORE loudly?    [ x]    [ ]  Do you often feel TIRED, fatigued, or sleepy during the day?    [ x]    [ ]  Has anyone OBSERVED you stop breathing during your sleep?    [ ]    [ x]  Do you have or are you being treated for high blood PRESSURE?    [ ]    [ x]  BMI >32 kg/m2    [ ]    [x ]  AGE > 50 years    [ ]    [x ]  NECK circumference >16 inches / 40 cm    [ ]    [x]  GENDER: male    [ ]    [ x]    KENDELL Probability:  [x ] 1-2 - low  [ ] 3-4 - intermediate  [ ] 5-8 - high      Sleep Disturbance History    Difficulty initiation sleep - yes    Difficulty Maintaining sleep - yes    Onset: >3 months;  13 years   Frequency: 7 nights / week    Sleep History    Bedtime: 9 to 10 PM  Rise time: 6:30 am  Sleep Latency: 25 to 60 minutes  Awakenings after sleep onset (WASO): 2-3x  Reasons for WASO: At least once nocturia, someone  turning the light   Time to return to sleep:  20 minutes, stated as stays in bed thoughts racing and thinks about day            Extrinsic Issues:     I.Adequate opportunity -  Yes    II. circumstances for sleep-  +Noise - Noise from outdoor neighbors disrupts her sleep  Temperature - denies too hot or too cold  +Light -  Yes son who is 16 years old will turn the light on from outside her room but it will come into her room and disrupt her sleep  Bedding - denies uncomfortable bedding  Bed Partner -  denies issues with bed partner disrupting sleep  Caffeine -   1 can of coke at lunch time  Alcohol -  denies   Substance use - denies  Screens in bed -  denies   Daytime napping - denies   Daytime use of bed for activities other than sleep - denies   patient unable to identify other extrinsic causes       Intrinsic disturbances endorsed to contribute to sleep initiation or sleep maintenance by patient:   + Pain - Yes sometimes generalized myalgias contributed to her fibromyalgia (denies fevers, chills, night-sweats, unexpected weight loss)  GERD -  denies   Depression -   PHQ2 is Zero  +Anxiety - Takes Klonopin 1-2 days a week half tab 1 mg for several years  +Rumination - Yes; Verbatim patient states brain does not stop thinking in bed  Sleep disordered breathing -  never had sleep study, enodrses +snoring, +non-restorative sleep  Endorses +subjective excessive daytime sleepiness by in room clinical encounter history discordant with self administered ep worth sleepiness scale of 2      RLS Symptoms: Possible  Described ache in legs vs tossing and turning in bed vs chronic generalized myalgias   Symptoms are evening and night time  Bruxism:No   Current sleep related gastroesophageal reflux symptoms:  No   Cataplexy:  No   Sleep Paralysis:  No   Hypnagogic or hypnopompic hallucinations: No   Parasomnias such as sleep walking or sleep eating No       Fibromyalgia she takes Milnacipran 12.5 mg twice daily states it is  "prescribed by rheumatologist who she also sees for SLE  Takes around dinner time 6 pm  for second dose  Has been on this medication for 2 to 2.5 years     Stopped taking Rozerem (ramelteon) 8 mg after attempting same for 1.5 weeks   She states because it did nothing to help with her sleep   Does admit to excessive light in her room see sleep history above         MEDICAL CONDITIONS (PMH)     Anxiety  SLE  Factor V Leiden    Social history:  Do you drive a commercial vehicle:  No   Shift work:  No   Tobacco use:  No   Alcohol use: denies   Occupation: Customer Service      Prior medications:   Lunesta caused metallic taste in the mouth lasted all day long, it was too disruptive she c ouldn't tolerate  Ambien IR stopped working   Ambien CR stopped working  Sonata did not help      Family Hx (parents and siblings) (pertaining to sleep medicine)  Denies     Medications: reviewed    Review of systems:  Positive symptoms are :  Snoring  Witnessed apnea  Daytime excessive sleepiness with Hutchins Sleepiness Scale of Total score: 2   Fatigue         Physical exam:  Vitals:    08/25/23 0700   BP: 134/83   Pulse: 103   SpO2: 100%   Weight: 71.8 kg (158 lb 4.8 oz)   Height: 162.6 cm (64.02\")    Body mass index is 27.16 kg/mý.   CONSTITUTIONAL:  Non-toxic, In no overt distress   Head: normocephalic   ENT: Mallampati class IV, no macroglossia, no septal defects   NECK:Neck Circumference: 12 inches,no nuchal rigidity  RESPIRATORY SYSTEM: Breath sounds are clear (no rales, no rhonchi, no wheezes), no accessory muscle use  CARDIOVASULAR SYSTEM: Heart sounds are regular rhythm and normal rate, no rub, no gallop, no edema  NEUROLOGICAL SYSTEM: Oriented x 3, No gross focal deficits   PSYCHIATRIC SYSTEM:  Affect is intermittently guarded       Office notes from care team reviewed. Office notes dated reviewed:  -6/7/2023 [facility] note patient on Ambien switch to Ambien CR for sleep initiation and sleep " maintenance issues.  Lunesta caused metallic taste in mouth.  Same visit in order for acute Lester 50 mg 30 tabs with 2 refills were just given.  -8/8/2023 APN Meredith Onofre and patient message medication not working anymore for sleep maintenance issues.  APRN notes tried Ambien, Ambien CR, Lunesta, Sonata.  States discontinue Sonata.  APRN plan to start Remeron.  -8/18/2023 Dr. Ray Judge Oncology work up for Leukopenia unspecified type in progress  Labs reviewed.  TSH Results:  TSH          12/6/2022    13:01 6/7/2023    10:44   TSH   TSH 2.800  3.480        8/17/2023 Bicarb 26.2  WBC 2.34  H&H 11.9/36.1  MCV 83    Assessment and plan:  Suspected sleep apnea [R29.818] patient's symptoms and examination is consistent with sleep apnea (G47.30). I have talked to the patient about the signs and symptoms of sleep apnea. In addition, I have also discussed pathophysiology of sleep apnea.  I also discussed the complications of untreated sleep apnea including effects on hypertension, diabetes mellitus and nonrestorative sleep with hypersomnia which can increase risk for motor vehicle accidents.  Untreated sleep apnea is also a risk factor for development of atrial fibrillation, hypertension, insulin resistance and cerebrovascular accident.  Discussed in detail of various testing methods including home-based and lab based sleep studies.  Based on history and physical examination and other comorbidities the most appropriate study Will order SPLIT for AHI > 15 in laboratory polysomnography, rather than home sleep apnea testing,to rule out diagnosis of sleep apnea per AASM clinical practice guidelines (doi:10.5664/jcsm.6506) secondary to patient's history of  chronic insomnia, low-pretest probability STOP-BANG 2/8 discordant with PE of Mallampati IV which does place her at risk for obstructive sleep apnea therefore would make in lab polysomnography ideal, clinical history also may include differential for RLS an in  laboratory polysomnography will help guide with medical decision making.The order for the sleep study is placed in [facility].  The test will be scheduled after approval from insurance. Treatment and management will be discussed after the test is completed.  Patient was given opportunity to ask questions and all the questions were answered.   Snoring (R06.83), snoring is the sound created by turbulent airflow vibrating upper airway soft tissue due to limitation of inspiratory airflow. I have also discussed factors affecting snoring including sleep deprivation, sleeping on the back and alcohol ingestion. To minimize snoring, patient is advised to have adequate sleep, sleep on the side and avoid alcohol and sedative medications before bedtime  Daytime excessive sleepiness .  It was assessed with clinical history a subjective endorsement of subjective excessive daytime sleepiness discordant with self administered Empire Sleepiness Scale of Total score: 2.  There are many causes for daytime excessive sleepiness including sleep depression, shiftwork syndrome, depression and other medical disorders including heart, kidney and liver failure.  The most serious cause of excessive sleepiness is due to neurological conditions like narcolepsy/cataplexy.  But the most common cause of excessive sleepiness is due to sleep apnea with frequent awakenings during sleep time.  I have discussed safety of driving and to remain vigilant while driving.  Restless Leg Syndrome Clinical history is possible for sleep related movement disorder, namely restless leg syndrome vs overlap of fibromyalgia. Will investigate with in laboratory polysomnography to guide clinical decision making.  Labs to follow: Ferritin, Iron Profile, Vitamin D325OH.   Anxiety  May consider sedating antidepressant in the future for comorbid anxiety and rumination in bed. Stimulus control therapy and appropriate trial of Ramelteon to continue first. Consider psychiatry  referral for further guidance for trial of sedating antidepressant.   Fibromyalgia Follow up with treating rheumatologist for same to discuss possibility of Milnacipran 12.5 mg BID dosing to move earlier in the day than 18:00 as may contribute to insomnia. If pain is a persistent issue disrupting sleep follow up with pain management may be warranted.  Insomnia (Chronic)  -Must treat underlying cause of insomnia, contributing factors in today's sleep clinic visit to include:   I. External factors: Light from 16-year-old son disrupting her sleep, noise from outdoor  (Counseled patient that she must find a way to keep her bedroom environment dark, sleeping eye mask was advised.  Counseled patient quite environment is important to healthy sleep; suggestions for earplugs)  II. Anxiety: Rumination was a factor. Follow up with PCP for serial monitoring of same, consider referral to psychiatry is advised as patient is also has Klonopin PRN use for anxiety; a trial of sedating antidepressant may be appropriate a referral to psychiatry for further guidance recommended to primary care physician.   -Stimulus control counseling is AASM Standard Recommendation regarding insomnia: counseling initiated to help establish and strength positive association between falling asleep and bed and bedtime routines. In room counseling provided with the following instructions were emphasized:  Maintain regular sleep-wake schedule  Avoiding napping   Use bed only for nocturnal sleep or intimacy   Attempt to fall asleep ONLY when sleepy   If unable to fall asleep within 20 minutes leave the bed and pursue a relaxing boring activity in a dark or dimly lit area, then return to bed only when sleepy (This STEP MUST BE REPEATED as often as necessary with emphasis to only attempt to fall asleep in bed when sleepy)   Healthy sleep tips handout provided to patient   -Standard therapy should be guided with offer of cognitive behavior therapy should the  above measures fail this should be initiated a sleep trained psychologist. Will consider referral to a sleep trained psychologist in the future.   -Patient reported failure of multiple BZRA trials initiated in the past by her PCP; must treat underlying cause of insomnia:  Ambien 10 mg  Ambien CR 12.5 mg  Lunesta dose unspecified (cause persistent metallic taste in mouth patient unable to tolerate)  Sonata 5 mg  Current: Ramelteon 8 mg qhs prescribed by primary care (extend trial of same as stopped after only 1.5 weeks) to continue for 3 months before therapy failure with address sleeping environment especially light and stimulus control therapy  Consider sedating antidepressant       Disposition 8/25/2023 Sleep Medicine Visit:  Must rule out sleep disordered breathing In lab SPLIT PSG, RLS Workup Ferritin/Iron-Profile/VaoP42BD, Extend trial of Ramelteon 8 mg must address Sleep environment issues especially light with use of this medication: +light/+noise/+rumination in bed/+periods of 20+minutes in bed, Stimulus Control Counseling initiated if no improvement then CBTi referral, Patient to follow up with rheumatologist to discuss earlier dosing of 6pm milnacipran BID regimen as can contribute to insomnia this will be difficult for fibromyalgia therefore if pain continues to disrupt sleep then pain management referral may be warranted.    I have also discussed with the patient the following  Sleep hygiene: Maintaining a regular bedtime and wake time, not to watch television or work in bed, limit caffeine-containing beverages before bed time and avoid naps during the day  Adequate amount of sleep.  Generally most people needs about 7 to 8 hours of sleep.    If PAP therapy warranted then Return for 31 to 90 days after PAP setup with download, if PAP therapy is not warranted after in lab PSG then must follow up in clinic to review results.  Patient's questions were answered      I once again thank you for asking me to  see this patient in consultation and I have forwarded my opinion and treatment plan.  Please do not hesitate to call me if you have any questions.       EMR Dragon/Transcription disclaimer:   Much of this encounter note is an electronic transcription/translation of spoken language to printed text. The electronic translation of spoken language may permit erroneous, or at times, nonsensical words or phrases to be inadvertently transcribed; Although I have reviewed the note for such errors, some may still exist.     NPI #: [id number]    Flavio Oates DO  Sleep Medicine  [facility]  08/25/23    Time Based visit 55 Minutes to include time spent prior to the patient and physician face to face encounter that involved this physician's review of prior medical records, review of past therapies, review of prior labs, followed by in room counseling, complex medical decision making warranting in laboratory polysomnography, labs ordered.

## 2023-08-26 DIAGNOSIS — E61.1 IRON DEFICIENCY: Primary | ICD-10-CM

## 2023-08-26 RX ORDER — FERROUS SULFATE 325(65) MG
325 TABLET ORAL EVERY OTHER DAY
Qty: 15 TABLET | Refills: 2 | Status: SHIPPED | OUTPATIENT
Start: 2023-08-26 | End: 2023-11-24

## 2023-08-26 NOTE — PROGRESS NOTES
8/25/2023 Lab results review:    1. Iron Deficiency-  -Ferritin is 85.5 ng/mL   This is an inflammatory marker, which may be elevated in a patient with a history of SLE therefore peripheral measure of iron store may be over estimated.  Given TSAT 13% and differential for patient to include sleep related movement disorder I will start the patient on:    Ferrous Sulfate 325 mg every other day for 3 months.  (Avoid taking with caffeine, dairy products, or calcium supplements within 2 hours of taking iron)      -If there is an improvement of nocturnal leg movement complaint at follow up sleep clinic with Ferrous Sulfate treatment then differential for RLS a clinical diagnosis will be more likely.   -In laboratory polysomnography (sleep study) as previously planned will further guide in medical decision making.  -Patient will need follow up with her primary care team for further investigation of possible iron deficiency.     NPI #: [id number]  [image]  Flavio Oates DO  Sleep Medicine  [facility]  08/26/23

## 2023-09-07 ENCOUNTER — OFFICE VISIT (OUTPATIENT)
Dept: FAMILY MEDICINE CLINIC | Facility: CLINIC | Age: 38
End: 2023-09-07
Payer: OTHER GOVERNMENT

## 2023-09-07 VITALS
DIASTOLIC BLOOD PRESSURE: 82 MMHG | WEIGHT: 160 LBS | HEART RATE: 89 BPM | BODY MASS INDEX: 27.31 KG/M2 | HEIGHT: 64 IN | OXYGEN SATURATION: 100 % | SYSTOLIC BLOOD PRESSURE: 133 MMHG

## 2023-09-07 DIAGNOSIS — Z79.899 LONG TERM USE OF DRUG: Primary | ICD-10-CM

## 2023-09-07 DIAGNOSIS — G47.00 INSOMNIA, UNSPECIFIED TYPE: ICD-10-CM

## 2023-09-07 DIAGNOSIS — F41.9 ANXIETY: ICD-10-CM

## 2023-09-07 DIAGNOSIS — R11.0 NAUSEA: ICD-10-CM

## 2023-09-07 RX ORDER — FOLIC ACID 1 MG/1
1 TABLET ORAL DAILY
Qty: 90 TABLET | Refills: 1 | Status: SHIPPED | OUTPATIENT
Start: 2023-09-07

## 2023-09-07 RX ORDER — CLONAZEPAM 1 MG/1
1 TABLET ORAL DAILY PRN
Qty: 30 TABLET | Refills: 0 | Status: SHIPPED | OUTPATIENT
Start: 2023-09-07

## 2023-09-07 RX ORDER — RAMELTEON 8 MG/1
8 TABLET ORAL NIGHTLY
Qty: 30 TABLET | Refills: 2 | Status: SHIPPED | OUTPATIENT
Start: 2023-09-07

## 2023-09-07 NOTE — PROGRESS NOTES
Chief Complaint  Insomnia    SUBJECTIVE  Eloisa Sims presents to [facility] pT Pt is here today for 3 month follow up.    Pt states she has been trying to take the ferrous sulfate but every time she takes it makes her very nasus and about 30 mins later she is vomiting.    Pt states she has been to the sleep doctor for an evaluation but nothing has been started yet.  He told her she needed to continue the sleep medication for at least 3 months ``    History of Present Illness  Past Medical History:   Diagnosis Date    Anxiety -     Deep vein thrombosis     Factor 5 Leiden mutation, heterozygous     Fibromyalgia     Fibromyalgia, primary 2019    Insomnia     SLE (systemic lupus erythematosus)       Family History   Problem Relation Age of Onset    Anxiety disorder Mother     Hyperlipidemia Father     Cancer Paternal Grandmother     Hyperlipidemia Paternal Grandmother     Diabetes Paternal Grandfather       Past Surgical History:   Procedure Laterality Date     SECTION      TONSILLECTOMY          Current Outpatient Medications:     apixaban (ELIQUIS) 5 MG tablet tablet, Take 1 tablet by mouth 2 (Two) Times a Day., Disp: 60 tablet, Rfl: 5    buPROPion XL (Wellbutrin XL) 150 MG 24 hr tablet, Take 1 tablet by mouth Daily., Disp: 90 tablet, Rfl: 1    cholecalciferol (VITAMIN D3) 25 MCG (1000 UT) tablet, 1 capsule Daily., Disp: , Rfl:     clonazePAM (KlonoPIN) 1 MG tablet, Take 1 tablet by mouth Daily As Needed for Anxiety., Disp: 30 tablet, Rfl: 0    ferrous sulfate 325 (65 FE) MG tablet, Take 1 tablet by mouth Every Other Day for 90 days. Avoid taking with caffeine, dairy products, or calcium supplements within 2 hours of taking your iron tablet., Disp: 15 tablet, Rfl: 2    folic acid (FOLVITE) 1 MG tablet, Take 1 tablet by mouth Daily., Disp: 90 tablet, Rfl: 1    Hydroxychloroquine Sulfate (PLAQUENIL PO), Take 200 mg by mouth Daily., Disp: , Rfl:      "Milnacipran HCl (Savella) 12.5 MG tablet, Take 90 mg by mouth 2 (Two) Times a Day., Disp: 180 tablet, Rfl: 1    ramelteon (ROZEREM) 8 MG tablet, Take 1 tablet by mouth Every Night., Disp: 30 tablet, Rfl: 2    vitamin D (ERGOCALCIFEROL) 1.25 MG (26264 UT) capsule capsule, Take 1 capsule by mouth 1 (One) Time Per Week., Disp: , Rfl:     clindamycin (CLEOCIN T) 1 % external solution, APPLY TOPICALLY TO THE SCALP EVERY DAY AS NEEDED (Patient not taking: Reported on 9/7/2023), Disp: , Rfl:     fluocinonide (LIDEX) 0.05 % external solution, APPLY TO BALDING AREAS IN SCALP EVERY NIGHT AT BEDTIME (Patient not taking: Reported on 9/7/2023), Disp: , Rfl:     triamcinolone (KENALOG) 0.5 % cream, , Disp: , Rfl:     zaleplon (SONATA) 5 MG capsule, Take 2 capsules by mouth Every Night. (Patient not taking: Reported on 9/7/2023), Disp: 60 capsule, Rfl: 1    zolpidem CR (Ambien CR) 12.5 MG CR tablet, Take 1 tablet by mouth At Night As Needed for Sleep. (Patient not taking: Reported on 9/7/2023), Disp: 90 tablet, Rfl: 0    OBJECTIVE  Vital Signs:   /82   Pulse 89   Ht 162.6 cm (64.02\")   Wt 72.6 kg (160 lb)   SpO2 100%   BMI 27.45 kg/m²    Estimated body mass index is 27.45 kg/m² as calculated from the following:    Height as of this encounter: 162.6 cm (64.02\").    Weight as of this encounter: 72.6 kg (160 lb).     Wt Readings from Last 3 Encounters:   09/07/23 72.6 kg (160 lb)   08/25/23 71.8 kg (158 lb 4.8 oz)   08/18/23 72 kg (158 lb 11.7 oz)     BP Readings from Last 3 Encounters:   09/07/23 133/82   08/25/23 134/83   08/18/23 132/87       Physical Exam  Vitals reviewed.   Constitutional:       Appearance: Normal appearance. She is well-developed.   HENT:      Head: Normocephalic and atraumatic.      Right Ear: External ear normal.      Left Ear: External ear normal.   Eyes:      Conjunctiva/sclera: Conjunctivae normal.      Pupils: Pupils are equal, round, and reactive to light.   Cardiovascular:      Rate and " Rhythm: Normal rate and regular rhythm.      Heart sounds: No murmur heard.    No friction rub. No gallop.   Pulmonary:      Effort: Pulmonary effort is normal.      Breath sounds: Normal breath sounds. No wheezing or rhonchi.   Skin:     General: Skin is warm and dry.   Neurological:      Mental Status: She is alert and oriented to person, place, and time.      Cranial Nerves: No cranial nerve deficit.   Psychiatric:         Mood and Affect: Mood and affect normal.         Behavior: Behavior normal.         Thought Content: Thought content normal.         Judgment: Judgment normal.        Result Review    CMP          2/13/2023    13:52 4/12/2023    12:19 8/17/2023    10:47   CMP   Glucose 83  94  96    BUN 13  14  12    Creatinine 0.76  0.79  0.82    EGFR 104.3  98.9  94.6    Sodium 141  141  141    Potassium 3.9  3.7  3.6    Chloride 106  108  105    Calcium 9.2  9.0  8.9    Total Protein  7.2     Total Protein 7.2  7.1  6.8    Albumin 4.5  4.4     3.9  4.2    Globulin  3.3     Globulin 2.7  2.7  2.6    Total Bilirubin <0.2  0.2  <0.2    Alkaline Phosphatase 79  83  95    AST (SGOT) 19  14  14    ALT (SGPT) 16  13  13    Albumin/Globulin Ratio 1.7  1.6  1.6    BUN/Creatinine Ratio 17.1  17.7  14.6    Anion Gap 9.0  8.4  9.8      CBC          4/12/2023    12:19 5/18/2023    11:04 8/17/2023    10:47   CBC   WBC 1.65  2.95  2.34    RBC 4.37  4.32  4.34    Hemoglobin 12.2  12.0  11.9    Hematocrit 36.1  35.8  36.1    MCV 82.6  82.9  83.2    MCH 27.9  27.8  27.4    MCHC 33.8  33.5  33.0    RDW 13.3  13.3  13.2    Platelets 214  201  210      TSH          12/6/2022    13:01 6/7/2023    10:44   TSH   TSH 2.800  3.480            No Images in the past 120 days found..     The above data has been reviewed by TIERRA Sharma 09/07/2023 10:30 EDT.          Patient Care Team:  Ling Ignacio APRN as PCP - General (Nurse Practitioner)  Ray Judge MD as Consulting Physician (Hematology and Oncology)             ASSESSMENT & PLAN    Diagnoses and all orders for this visit:    1. Long term use of drug (Primary)  -     POC Urine Drug Screen [facility] Bio-Cup    2. Anxiety  Overview:  Well-controlled with PRN use Klonopin, continue current dose    Orders:  -     clonazePAM (KlonoPIN) 1 MG tablet; Take 1 tablet by mouth Daily As Needed for Anxiety.  Dispense: 30 tablet; Refill: 0    3. Insomnia, unspecified type  Assessment & Plan:  Not well controlled at present, in the process of being worked up by sleep specialty, we will trial Rozerem for 3 months per    Orders:  -     ramelteon (ROZEREM) 8 MG tablet; Take 1 tablet by mouth Every Night.  Dispense: 30 tablet; Refill: 2    4. Nausea  Comments:  [facility] trial OTC iron supplement such as Slow Fe, iron Gummies, or even Ledbetter multivitamin with iron to see if better tolerated    Other orders  -     folic acid (FOLVITE) 1 MG tablet; Take 1 tablet by mouth Daily.  Dispense: 90 tablet; Refill: 1         Tobacco Use: Low Risk     Smoking Tobacco Use: Never    Smokeless Tobacco Use: Never    Passive Exposure: Not on file       Follow Up     Return in 3 months (on 12/7/2023), or if symptoms worsen or fail to improve.        Patient was given instructions and counseling regarding her condition or for health maintenance advice. Please see specific information pulled into the AVS if appropriate.   I have reviewed information obtained and documented by others and I have confirmed the accuracy of this documented note.    TIERRA Sharma

## 2023-09-07 NOTE — ASSESSMENT & PLAN NOTE
Not well controlled at present, in the process of being worked up by sleep specialty, we will trial Rozerem for 3 months per

## 2023-09-11 ENCOUNTER — TELEPHONE (OUTPATIENT)
Dept: FAMILY MEDICINE CLINIC | Facility: CLINIC | Age: 38
End: 2023-09-11
Payer: OTHER GOVERNMENT

## 2023-09-11 DIAGNOSIS — F32.A DEPRESSION, UNSPECIFIED DEPRESSION TYPE: ICD-10-CM

## 2023-09-11 RX ORDER — BUPROPION HYDROCHLORIDE 150 MG/1
150 TABLET ORAL DAILY
Qty: 90 TABLET | Refills: 0 | Status: SHIPPED | OUTPATIENT
Start: 2023-09-11

## 2023-09-11 NOTE — TELEPHONE ENCOUNTER
----- Message from Eloisa Sims sent at 9/9/2023  7:55 AM EDT -----  Regarding: Welbutrin  Contact: [phone]  I need refills of the Welbutrin, it would go to the [facility] pharmacy. Thank you

## 2023-09-14 ENCOUNTER — PATIENT MESSAGE (OUTPATIENT)
Dept: FAMILY MEDICINE CLINIC | Facility: CLINIC | Age: 38
End: 2023-09-14
Payer: OTHER GOVERNMENT

## 2023-09-14 DIAGNOSIS — F32.A DEPRESSION, UNSPECIFIED DEPRESSION TYPE: ICD-10-CM

## 2023-09-14 NOTE — TELEPHONE ENCOUNTER
----- Message from Eloisa Sims sent at 9/14/2023  4:23 PM EDT -----  Regarding: zina  Contact: [phone]  I went to call in a refill for my savella, When I contacted the uemotolgist, they said this should come from my PCM so I put in that request. [facility].    They also said I need an updated Rheumatologist referral. thanks

## 2023-09-21 RX ORDER — MILNACIPRAN HCL 12.5 MG
12.5 TABLET ORAL 2 TIMES DAILY
Qty: 180 TABLET | Refills: 1 | Status: SHIPPED | OUTPATIENT
Start: 2023-09-21

## 2023-10-11 DIAGNOSIS — D68.51 FACTOR V LEIDEN: ICD-10-CM

## 2023-10-11 DIAGNOSIS — D68.52 PROTHROMBIN GENE MUTATION: ICD-10-CM

## 2023-10-11 DIAGNOSIS — M32.9 SYSTEMIC LUPUS ERYTHEMATOSUS, UNSPECIFIED SLE TYPE, UNSPECIFIED ORGAN INVOLVEMENT STATUS: ICD-10-CM

## 2023-10-11 DIAGNOSIS — I82.4Y2 ACUTE DEEP VEIN THROMBOSIS (DVT) OF PROXIMAL VEIN OF LEFT LOWER EXTREMITY: ICD-10-CM

## 2023-10-11 NOTE — TELEPHONE ENCOUNTER
Caller: Eloisa Sims    Relationship: Self    Best call back number: [phone]     Requested Prescriptions:   Requested Prescriptions     Pending Prescriptions Disp Refills    apixaban (ELIQUIS) 5 MG tablet tablet 60 tablet 5     Sig: Take 1 tablet by mouth 2 (Two) Times a Day.        Pharmacy where request should be sent: [id number] [place] [facility] [address] AT [facility] [phone] [facility] [phone]      Last office visit with prescribing clinician: 8/18/2023   Last telemedicine visit with prescribing clinician: Visit date not found   Next office visit with prescribing clinician: 11/15/2023     Additional details provided by patient: PLEASE BE SURE TO SEND TO [facility].    Does the patient have less than a 3 day supply:  [x] Yes  [] No    Would you like a call back once the refill request has been completed: [] Yes [x] No    If the office needs to give you a call back, can they leave a voicemail: [] Yes [x] No    Liseth Becerril Rep   10/11/23 15:10 EDT

## 2023-10-12 DIAGNOSIS — D68.51 FACTOR V LEIDEN: ICD-10-CM

## 2023-10-12 DIAGNOSIS — I82.4Y2 ACUTE DEEP VEIN THROMBOSIS (DVT) OF PROXIMAL VEIN OF LEFT LOWER EXTREMITY: ICD-10-CM

## 2023-10-12 DIAGNOSIS — D68.52 PROTHROMBIN GENE MUTATION: ICD-10-CM

## 2023-10-12 DIAGNOSIS — M32.9 SYSTEMIC LUPUS ERYTHEMATOSUS, UNSPECIFIED SLE TYPE, UNSPECIFIED ORGAN INVOLVEMENT STATUS: ICD-10-CM

## 2023-10-12 NOTE — TELEPHONE ENCOUNTER
Refill    10/11/2023  [facility]       Ray Judge MD  Oncology Factor V Leiden +3 more  Dx Med Refill  Reason for call     All Conversations: Med Refill  (Oldest Message First)  October 11, 2023           10/11/23  3:09 PM  MahamedEloisa sharma Gala contacted Chrissy Pond RegSched Rep Baugh, Holly, RegSched Rep         10/11/23  3:11 PM  Note            Caller: Eloisa Sims     Relationship: Self     Best call back number: [phone]      Requested Prescriptions:   Requested Prescriptions             Pending Prescriptions Disp Refills    apixaban (ELIQUIS) 5 MG tablet tablet 60 tablet 5       Sig: Take 1 tablet by mouth 2 (Two) Times a Day.         Pharmacy where request should be sent: [facility] #[id number] - [facility] [place] - [address] AT [facility] [phone] [facility] [phone] [facility]      Last office visit with prescribing clinician: 8/18/2023   Last telemedicine visit with prescribing clinician: Visit date not found   Next office visit with prescribing clinician: 11/15/2023      Additional details provided by patient: PLEASE BE SURE TO SEND TO [facility].    CANCEL THE SCRIPT THAT WAS SENT TO [place]     Does the patient have less than a 3 day supply:  [x] Yes  [] No     Would you like a call back once the refill request has been completed: [] Yes [x] No     If the office needs to give you a call back, can they leave a voicemail: [] Yes [x] No     Liseth Becerril   10/11/23 15:10 EDT

## 2023-10-27 ENCOUNTER — HOSPITAL ENCOUNTER (OUTPATIENT)
Dept: SLEEP MEDICINE | Facility: HOSPITAL | Age: 38
Discharge: HOME | End: 2023-10-27
Admitting: FAMILY MEDICINE
Payer: OTHER GOVERNMENT

## 2023-10-27 DIAGNOSIS — G25.81 RESTLESS LEGS SYNDROME (RLS): ICD-10-CM

## 2023-10-27 DIAGNOSIS — R06.83 SNORING: ICD-10-CM

## 2023-10-27 DIAGNOSIS — G47.19 EXCESSIVE DAYTIME SLEEPINESS: ICD-10-CM

## 2023-10-27 DIAGNOSIS — F51.04 CHRONIC INSOMNIA: ICD-10-CM

## 2023-10-27 PROCEDURE — 95810 POLYSOM 6/> YRS 4/> PARAM: CPT

## 2023-10-30 ENCOUNTER — OFFICE VISIT (OUTPATIENT)
Dept: FAMILY MEDICINE CLINIC | Facility: CLINIC | Age: 38
End: 2023-10-30
Payer: OTHER GOVERNMENT

## 2023-10-30 VITALS
HEIGHT: 64 IN | HEART RATE: 100 BPM | DIASTOLIC BLOOD PRESSURE: 77 MMHG | BODY MASS INDEX: 26.98 KG/M2 | SYSTOLIC BLOOD PRESSURE: 131 MMHG | OXYGEN SATURATION: 100 % | WEIGHT: 158 LBS

## 2023-10-30 DIAGNOSIS — M32.9 SYSTEMIC LUPUS ERYTHEMATOSUS, UNSPECIFIED SLE TYPE, UNSPECIFIED ORGAN INVOLVEMENT STATUS: Primary | ICD-10-CM

## 2023-10-30 DIAGNOSIS — F32.A DEPRESSION, UNSPECIFIED DEPRESSION TYPE: ICD-10-CM

## 2023-10-30 DIAGNOSIS — M79.7 FIBROMYALGIA: ICD-10-CM

## 2023-10-30 PROCEDURE — 99213 OFFICE O/P EST LOW 20 MIN: CPT | Performed by: NURSE PRACTITIONER

## 2023-10-30 RX ORDER — PEDIATRIC MULTIPLE VITAMINS W/ IRON CHEW TAB 18 MG 18 MG
1 CHEW TAB ORAL DAILY
COMMUNITY

## 2023-10-30 RX ORDER — BUPROPION HYDROCHLORIDE 150 MG/1
150 TABLET ORAL DAILY
Qty: 90 TABLET | Refills: 0 | Status: CANCELLED | OUTPATIENT
Start: 2023-10-30

## 2023-10-30 RX ORDER — MILNACIPRAN HCL 12.5 MG
12.5 TABLET ORAL 2 TIMES DAILY
Qty: 180 TABLET | Refills: 1 | Status: CANCELLED | OUTPATIENT
Start: 2023-10-30

## 2023-10-30 NOTE — ASSESSMENT & PLAN NOTE
Stable and fairly well controlled with Savella, however patient does have occasional flareups of fibro and lupus symptoms, FMLA paperwork completed for intermittent FMLA today.  Continue current medications

## 2023-10-30 NOTE — PROGRESS NOTES
Chief Complaint  Lupus/fibromyalgis and FMLA Paperwork     SUBJECTIVE  Eloisa Sims presents to [facility]     Patient here to discuss lupus and fibromyalgia    Pt here today needing FMLA paperwork completed for intermittent leave for Lupus and Fibromyalgia     Declines Flu Vaccine      History of Present Illness  Past Medical History:   Diagnosis Date    Anxiety YOLIE- 2015    Deep vein thrombosis     Factor 5 Leiden mutation, heterozygous     Fibromyalgia     Fibromyalgia, primary 2019    Insomnia     SLE (systemic lupus erythematosus)       Family History   Problem Relation Age of Onset    Anxiety disorder Mother     Hyperlipidemia Father     Cancer Paternal Grandmother     Hyperlipidemia Paternal Grandmother     Diabetes Paternal Grandfather       Past Surgical History:   Procedure Laterality Date     SECTION      TONSILLECTOMY          Current Outpatient Medications:     apixaban (ELIQUIS) 5 MG tablet tablet, Take 1 tablet by mouth 2 (Two) Times a Day., Disp: 60 tablet, Rfl: 5    buPROPion XL (Wellbutrin XL) 150 MG 24 hr tablet, Take 1 tablet by mouth Daily., Disp: 90 tablet, Rfl: 0    cholecalciferol (VITAMIN D3) 25 MCG (1000 UT) tablet, 1 capsule Daily., Disp: , Rfl:     clonazePAM (KlonoPIN) 1 MG tablet, Take 1 tablet by mouth Daily As Needed for Anxiety., Disp: 30 tablet, Rfl: 0    folic acid (FOLVITE) 1 MG tablet, Take 1 tablet by mouth Daily., Disp: 90 tablet, Rfl: 1    Hydroxychloroquine Sulfate (PLAQUENIL PO), Take 200 mg by mouth Daily., Disp: , Rfl:     Milnacipran HCl (Savella) 12.5 MG tablet, Take 1 tablet by mouth 2 (Two) Times a Day., Disp: 180 tablet, Rfl: 1    Pediatric Multivitamins-Iron (Animal Shapes/Iron) 18 MG chewable tablet, Chew 1 tablet Daily. FLINSTONES, Disp: , Rfl:     ramelteon (ROZEREM) 8 MG tablet, Take 1 tablet by mouth Every Night., Disp: 30 tablet, Rfl: 2    zaleplon (SONATA) 5 MG capsule, Take 2 capsules by mouth Every  "Night. (Patient not taking: Reported on 9/7/2023), Disp: 60 capsule, Rfl: 1    OBJECTIVE  Vital Signs:   /77   Pulse 100   Ht 162.6 cm (64.02\")   Wt 71.7 kg (158 lb)   SpO2 100%   BMI 27.10 kg/m²    Estimated body mass index is 27.1 kg/m² as calculated from the following:    Height as of this encounter: 162.6 cm (64.02\").    Weight as of this encounter: 71.7 kg (158 lb).     Wt Readings from Last 3 Encounters:   10/30/23 71.7 kg (158 lb)   09/07/23 72.6 kg (160 lb)   08/25/23 71.8 kg (158 lb 4.8 oz)     BP Readings from Last 3 Encounters:   10/30/23 131/77   09/07/23 133/82   08/25/23 134/83       Physical Exam  Vitals reviewed.   Constitutional:       Appearance: Normal appearance. She is well-developed.   HENT:      Head: Normocephalic and atraumatic.      Right Ear: External ear normal.      Left Ear: External ear normal.   Eyes:      Conjunctiva/sclera: Conjunctivae normal.      Pupils: Pupils are equal, round, and reactive to light.   Cardiovascular:      Rate and Rhythm: Normal rate and regular rhythm.      Heart sounds: No murmur heard.     No friction rub. No gallop.   Pulmonary:      Effort: Pulmonary effort is normal.      Breath sounds: Normal breath sounds. No wheezing or rhonchi.   Skin:     General: Skin is warm and dry.   Neurological:      Mental Status: She is alert and oriented to person, place, and time.      Cranial Nerves: No cranial nerve deficit.   Psychiatric:         Mood and Affect: Mood and affect normal.         Behavior: Behavior normal.         Thought Content: Thought content normal.         Judgment: Judgment normal.          Result Review    CMP          2/13/2023    13:52 4/12/2023    12:19 8/17/2023    10:47   CMP   Glucose 83  94  96    BUN 13  14  12    Creatinine 0.76  0.79  0.82    EGFR 104.3  98.9  94.6    Sodium 141  141  141    Potassium 3.9  3.7  3.6    Chloride 106  108  105    Calcium 9.2  9.0  8.9    Total Protein  7.2     Total Protein 7.2  7.1  6.8  "   Albumin 4.5  4.4     3.9  4.2    Globulin  3.3     Globulin 2.7  2.7  2.6    Total Bilirubin <0.2  0.2  <0.2    Alkaline Phosphatase 79  83  95    AST (SGOT) 19  14  14    ALT (SGPT) 16  13  13    Albumin/Globulin Ratio 1.7  1.6  1.6    BUN/Creatinine Ratio 17.1  17.7  14.6    Anion Gap 9.0  8.4  9.8      CBC          4/12/2023    12:19 5/18/2023    11:04 8/17/2023    10:47   CBC   WBC 1.65  2.95  2.34    RBC 4.37  4.32  4.34    Hemoglobin 12.2  12.0  11.9    Hematocrit 36.1  35.8  36.1    MCV 82.6  82.9  83.2    MCH 27.9  27.8  27.4    MCHC 33.8  33.5  33.0    RDW 13.3  13.3  13.2    Platelets 214  201  210      Lipid Panel          6/7/2023    10:44   Lipid Panel   Total Cholesterol 143    Triglycerides 93    HDL Cholesterol 47    VLDL Cholesterol 17    LDL Cholesterol  79    LDL/HDL Ratio 1.65      TSH          12/6/2022    13:01 6/7/2023    10:44   TSH   TSH 2.800  3.480        No Images in the past 120 days found..     The above data has been reviewed by TIERRA Sharma 10/30/2023 10:04 EDT.          Patient Care Team:  Ling Ignacio APRN as PCP - General (Nurse Practitioner)  Ray Judge MD as Consulting Physician (Hematology and Oncology)            ASSESSMENT & PLAN    Diagnoses and all orders for this visit:    1. Systemic lupus erythematosus, unspecified SLE type, unspecified organ involvement status (Primary)  Assessment & Plan:  Managed by rheumatology, patient taking Plaquenil, continue regular follow-ups      2. Depression, unspecified depression type  Comments:  Well-controlled with Wellbutrin, continue current dose  Assessment & Plan:  Well-controlled with Wellbutrin and Savella, continue current medications      3. Fibromyalgia  Assessment & Plan:  Stable and fairly well controlled with Savella, however patient does have occasional flareups of fibro and lupus symptoms, FMLA paperwork completed for intermittent FMLA today.  Continue current medications      FMLA paperwork  completed.    Tobacco Use: Low Risk  (10/30/2023)    Patient History     Smoking Tobacco Use: Never     Smokeless Tobacco Use: Never     Passive Exposure: Not on file       Follow Up     Return if symptoms worsen or fail to improve.        Patient was given instructions and counseling regarding her condition or for health maintenance advice. Please see specific information pulled into the AVS if appropriate.   I have reviewed information obtained and documented by others and I have confirmed the accuracy of this documented note.    Ling Ignacio, APRN

## 2023-11-15 ENCOUNTER — LAB (OUTPATIENT)
Dept: ONCOLOGY | Facility: HOSPITAL | Age: 38
End: 2023-11-15
Payer: OTHER GOVERNMENT

## 2023-11-15 ENCOUNTER — OFFICE VISIT (OUTPATIENT)
Dept: ONCOLOGY | Facility: HOSPITAL | Age: 38
End: 2023-11-15
Payer: OTHER GOVERNMENT

## 2023-11-15 VITALS
RESPIRATION RATE: 18 BRPM | WEIGHT: 154.54 LBS | BODY MASS INDEX: 26.38 KG/M2 | DIASTOLIC BLOOD PRESSURE: 79 MMHG | TEMPERATURE: 97 F | HEIGHT: 64 IN | SYSTOLIC BLOOD PRESSURE: 126 MMHG | HEART RATE: 95 BPM | OXYGEN SATURATION: 98 %

## 2023-11-15 DIAGNOSIS — D72.819 LEUKOPENIA, UNSPECIFIED TYPE: Primary | ICD-10-CM

## 2023-11-15 DIAGNOSIS — D72.819 LEUKOPENIA, UNSPECIFIED TYPE: ICD-10-CM

## 2023-11-15 DIAGNOSIS — D68.51 FACTOR V LEIDEN: ICD-10-CM

## 2023-11-15 DIAGNOSIS — M32.9 SYSTEMIC LUPUS ERYTHEMATOSUS, UNSPECIFIED SLE TYPE, UNSPECIFIED ORGAN INVOLVEMENT STATUS: ICD-10-CM

## 2023-11-15 DIAGNOSIS — D68.52 PROTHROMBIN GENE MUTATION: ICD-10-CM

## 2023-11-15 LAB
ALBUMIN SERPL-MCNC: 4.2 G/DL (ref 3.5–5.2)
ALBUMIN/GLOB SERPL: 1.5 G/DL
ALP SERPL-CCNC: 79 U/L (ref 39–117)
ALT SERPL W P-5'-P-CCNC: 12 U/L (ref 1–33)
ANION GAP SERPL CALCULATED.3IONS-SCNC: 8.3 MMOL/L (ref 5–15)
AST SERPL-CCNC: 14 U/L (ref 1–32)
BASOPHILS # BLD AUTO: 0.01 10*3/MM3 (ref 0–0.2)
BASOPHILS NFR BLD AUTO: 0.4 % (ref 0–1.5)
BILIRUB SERPL-MCNC: 0.3 MG/DL (ref 0–1.2)
BUN SERPL-MCNC: 10 MG/DL (ref 6–20)
BUN/CREAT SERPL: 12 (ref 7–25)
CALCIUM SPEC-SCNC: 8.9 MG/DL (ref 8.6–10.5)
CHLORIDE SERPL-SCNC: 104 MMOL/L (ref 98–107)
CO2 SERPL-SCNC: 24.7 MMOL/L (ref 22–29)
CREAT SERPL-MCNC: 0.83 MG/DL (ref 0.57–1)
DEPRECATED RDW RBC AUTO: 40.1 FL (ref 37–54)
EGFRCR SERPLBLD CKD-EPI 2021: 93.3 ML/MIN/1.73
EOSINOPHIL # BLD AUTO: 0.05 10*3/MM3 (ref 0–0.4)
EOSINOPHIL NFR BLD AUTO: 2 % (ref 0.3–6.2)
ERYTHROCYTE [DISTWIDTH] IN BLOOD BY AUTOMATED COUNT: 13.1 % (ref 12.3–15.4)
GLOBULIN UR ELPH-MCNC: 2.8 GM/DL
GLUCOSE SERPL-MCNC: 108 MG/DL (ref 65–99)
HCT VFR BLD AUTO: 34.1 % (ref 34–46.6)
HGB BLD-MCNC: 11.3 G/DL (ref 12–15.9)
IMM GRANULOCYTES # BLD AUTO: 0.01 10*3/MM3 (ref 0–0.05)
IMM GRANULOCYTES NFR BLD AUTO: 0.4 % (ref 0–0.5)
LYMPHOCYTES # BLD AUTO: 0.59 10*3/MM3 (ref 0.7–3.1)
LYMPHOCYTES NFR BLD AUTO: 23.2 % (ref 19.6–45.3)
MCH RBC QN AUTO: 27.4 PG (ref 26.6–33)
MCHC RBC AUTO-ENTMCNC: 33.1 G/DL (ref 31.5–35.7)
MCV RBC AUTO: 82.8 FL (ref 79–97)
MONOCYTES # BLD AUTO: 0.37 10*3/MM3 (ref 0.1–0.9)
MONOCYTES NFR BLD AUTO: 14.6 % (ref 5–12)
NEUTROPHILS NFR BLD AUTO: 1.51 10*3/MM3 (ref 1.7–7)
NEUTROPHILS NFR BLD AUTO: 59.4 % (ref 42.7–76)
PLATELET # BLD AUTO: 198 10*3/MM3 (ref 140–450)
PMV BLD AUTO: 10.3 FL (ref 6–12)
POTASSIUM SERPL-SCNC: 2.8 MMOL/L (ref 3.5–5.2)
PROT SERPL-MCNC: 7 G/DL (ref 6–8.5)
RBC # BLD AUTO: 4.12 10*6/MM3 (ref 3.77–5.28)
SODIUM SERPL-SCNC: 137 MMOL/L (ref 136–145)
WBC NRBC COR # BLD: 2.54 10*3/MM3 (ref 3.4–10.8)

## 2023-11-15 PROCEDURE — G0463 HOSPITAL OUTPT CLINIC VISIT: HCPCS | Performed by: INTERNAL MEDICINE

## 2023-11-15 PROCEDURE — 85025 COMPLETE CBC W/AUTO DIFF WBC: CPT

## 2023-11-15 PROCEDURE — 36415 COLL VENOUS BLD VENIPUNCTURE: CPT

## 2023-11-15 PROCEDURE — 80053 COMPREHEN METABOLIC PANEL: CPT

## 2023-11-15 NOTE — PROGRESS NOTES
Chief Complaint  Systemic lupus erythematosus, unspecified SLE type, unspeci    Ling Ignacio, AP*  Ling Ignacio, APRN      Subjective      Diagnosis: Heterozygous Prothrombin mutation and Heterozygous Factor V Leiden mutation    H/o Lupus under management of Rheumatologist Dr. Patton    Leukopenia-active surveillance    Current Treatment: Eliquis BID-for thrombophilia    Previous Treatment: None        Eloisa Sims presents to [facility] for follow up on lab results and refill for Eliquis.     History of Present Illness   Ms. Sims was seen previously in consultation in July with Dr. Ch after developing a left upper leg DVT after a fall. She has had a previous clot in the right lower extremity. She was evaluated for genetic defects with the hypercoagulable state.     Factor II level was found to be heterozygous as well as factor V leiden was heterozygous. She also has lupus diagnosis and has not seen her rheumatology yet but scheduled for later in November. She is compliant with the daily Eliquis she is taking BID dosing. She reports her  PCP wants us to prescribe anti-coagulation.     Interval History: Patient presents for follow-up for management of anticoagulation given her history of DVT as well as diagnosis of heterozygous prothrombin mutation and heterozygous factor V Leiden mutation.  Patient denies any blood clots since last clinic appointment.  She denies any blood loss or melena.  She reports tolerating Eliquis and compliance with this medication.  She says she has been informed of having chronic low white blood cell count by her previous hematologist. Pt reports recovering from a cold last week, reports URTI symptoms which have resolved. Pt reports upcoming appointment with her rheumatologist to address lupus. Pt continues to take Plaquenil for lupus.    Oncology/Hematology History    No history exists.       Review of Systems    Constitutional:  Positive for fatigue (6/10). Negative for appetite change, diaphoresis, fever, unexpected weight gain and unexpected weight loss.   HENT: Negative.  Negative for hearing loss, mouth sores, sore throat, swollen glands, trouble swallowing and voice change.    Eyes: Negative.  Negative for blurred vision.   Respiratory: Negative.  Negative for cough, shortness of breath and wheezing.    Cardiovascular: Negative.  Negative for chest pain and palpitations.   Gastrointestinal: Negative.  Negative for abdominal pain, blood in stool, constipation, diarrhea, nausea and vomiting.   Endocrine: Negative.  Negative for cold intolerance and heat intolerance.   Genitourinary: Negative.  Negative for difficulty urinating, dysuria, frequency, hematuria and urinary incontinence.   Musculoskeletal: Negative.  Negative for arthralgias, back pain and myalgias.   Skin:  Negative for rash, skin lesions and wound.   Allergic/Immunologic: Negative.    Neurological: Negative.  Negative for dizziness, seizures, weakness, numbness and headache.   Hematological: Negative.  Does not bruise/bleed easily.   Psychiatric/Behavioral: Negative.  Negative for depressed mood. The patient is not nervous/anxious.    All other systems reviewed and are negative.      Current Outpatient Medications on File Prior to Visit   Medication Sig Dispense Refill    apixaban (ELIQUIS) 5 MG tablet tablet Take 1 tablet by mouth 2 (Two) Times a Day. 60 tablet 5    buPROPion XL (Wellbutrin XL) 150 MG 24 hr tablet Take 1 tablet by mouth Daily. 90 tablet 0    cholecalciferol (VITAMIN D3) 25 MCG (1000 UT) tablet 1 capsule Daily.      clonazePAM (KlonoPIN) 1 MG tablet Take 1 tablet by mouth Daily As Needed for Anxiety. 30 tablet 0    folic acid (FOLVITE) 1 MG tablet Take 1 tablet by mouth Daily. 90 tablet 1    Hydroxychloroquine Sulfate (PLAQUENIL PO) Take 200 mg by mouth Daily.      Milnacipran HCl (Savella) 12.5 MG tablet Take 1 tablet by mouth 2 (Two)  Times a Day. 180 tablet 1    Pediatric Multivitamins-Iron (Animal Shapes/Iron) 18 MG chewable tablet Chew 1 tablet Daily. FLINSTONES      ramelteon (ROZEREM) 8 MG tablet Take 1 tablet by mouth Every Night. 30 tablet 2    zaleplon (SONATA) 5 MG capsule Take 2 capsules by mouth Every Night. (Patient not taking: Reported on 2023) 60 capsule 1     No current facility-administered medications on file prior to visit.       Allergies   Allergen Reactions    Penicillins Hives    Sulfa Antibiotics Hives     Past Medical History:   Diagnosis Date    Anxiety YOLIE- 2015    Deep vein thrombosis     Factor 5 Leiden mutation, heterozygous     Fibromyalgia     Fibromyalgia, primary 2019    Insomnia     SLE (systemic lupus erythematosus)      Past Surgical History:   Procedure Laterality Date     SECTION      TONSILLECTOMY       Social History     Socioeconomic History    Marital status:    Tobacco Use    Smoking status: Never    Smokeless tobacco: Never   Vaping Use    Vaping Use: Never used   Substance and Sexual Activity    Alcohol use: Yes     Alcohol/week: 1.0 standard drink of alcohol     Types: 1 Glasses of wine per week     Comment: social/rare drinker of wine    Drug use: Never    Sexual activity: Yes     Partners: Male     Comment:  had vasectomy     Family History   Problem Relation Age of Onset    Anxiety disorder Mother     Hyperlipidemia Father     Cancer Paternal Grandmother     Hyperlipidemia Paternal Grandmother     Diabetes Paternal Grandfather        There is no immunization history on file for this patient.    Objective   Physical Exam  Vitals reviewed. Exam conducted with a chaperone present.   Constitutional:       Appearance: Normal appearance. She is normal weight.   HENT:      Head: Normocephalic.   Eyes:      Pupils: Pupils are equal, round, and reactive to light.   Pulmonary:      Effort: Pulmonary effort is normal.   Musculoskeletal:         General: Normal range of  "motion.      Cervical back: Normal range of motion and neck supple.   Neurological:      General: No focal deficit present.      Mental Status: She is alert and oriented to person, place, and time.         Vitals:    11/15/23 1351   BP: 126/79   Pulse: 95   Resp: 18   Temp: 97 °F (36.1 °C)   TempSrc: Temporal   SpO2: 98%   Weight: 70.1 kg (154 lb 8.7 oz)   Height: 162.6 cm (64.02\")   PainSc: 0-No pain       ECOG score: 0         ECOG: (0) Fully Active - Able to Carry On All Pre-disease Performance Without Restriction  Fall Risk Assessment was completed, and patient is at low risk for falls.  PHQ-9 Total Score: 0       The patient is  experiencing fatigue. Fatigue score: 7          Result Review :   The following data was reviewed by: Ray Judge MD on 09/07/2022:  Lab Results   Component Value Date    HGB 11.3 (L) 11/15/2023    HCT 34.1 11/15/2023    MCV 82.8 11/15/2023     11/15/2023    WBC 2.54 (L) 11/15/2023    NEUTROABS 1.51 (L) 11/15/2023    LYMPHSABS 0.59 (L) 11/15/2023    MONOSABS 0.37 11/15/2023    EOSABS 0.05 11/15/2023    BASOSABS 0.01 11/15/2023     Lab Results   Component Value Date    GLUCOSE 108 (H) 11/15/2023    BUN 10 11/15/2023    CREATININE 0.83 11/15/2023     11/15/2023    K 2.8 (L) 11/15/2023     11/15/2023    CO2 24.7 11/15/2023    CALCIUM 8.9 11/15/2023    PROTEINTOT 7.0 11/15/2023    ALBUMIN 4.2 11/15/2023    BILITOT 0.3 11/15/2023    ALKPHOS 79 11/15/2023    AST 14 11/15/2023    ALT 12 11/15/2023          Assessment and Plan    Diagnoses and all orders for this visit:    1. Leukopenia, unspecified type (Primary)  -     CBC & Differential; Future  -     Comprehensive Metabolic Panel; Future    2. Factor V Leiden  -     CBC & Differential; Future  -     Comprehensive Metabolic Panel; Future    3. Prothrombin gene mutation  -     CBC & Differential; Future  -     Comprehensive Metabolic Panel; Future           She is positive for heterozygous prothrombin gene mutation as " well as heterozygous FVL. Given her history of 2 DVTs, along with history of heterozygous prothrombin gene mutation as well as heterozygous factor V Leiden mutation, I would recommend lifelong anti-coagulation with Eliquis.   She reports her PCP would like for us to refill her Eliquis.   Eliquis refills, pt is to contact us when she requires refill of this medication    Leukopenia: Discussed most recent CBC from 11/15/2023. Most recent white blood cell count remains stable from previous values Per lab work in our system it appears patient has history of low white blood cell count has been occurring for several years.  Patient with some occasional infections during lupus flares, but without any other B symptoms.  Results of flow cytometry obtained on peripheral blood at previous clinic appointment revealed B cells that show nonspecific light chain binding.  Thus, obtained lab work to assess levels of immunoglobulins as well as SPEP, free light chains, immunofixation (SPEP was negative for M spike, K/L FLC ratio was 1.26- normal from 4/2023) since this work-up was unremarkable suspect could be secondary to lupus medications prescribed by rheumatologist and/or secondary to her autoimmune conditions. Also, immunoglobulin levels form 4/12/23 were within normal limits. Recommend pt follow up with her Rheumatologist as scheduled for continued management of lupus. Will continue to monitor blood counts.    Plan for patient to follow-up in 6 months to recheck lab values with CBC and CMP.     Would consider BMBx if pt with worsening cytopenias on repeat blood work.     Patient verbalized understanding and agreement with plan.    Electronically signed by Ray Judge MD, 11/15/23, 2:29 PM EST.        I spent 30 minutes caring for Eloisa on this date of service. This time includes time spent by me in the following activities:preparing for the visit, reviewing tests, obtaining and/or reviewing a separately obtained history,  performing a medically appropriate examination and/or evaluation , counseling and educating the patient/family/caregiver, ordering medications, tests, or procedures, referring and communicating with other health care professionals , documenting information in the medical record, independently interpreting results and communicating that information with the patient/family/caregiver and care coordination        Patient was given instructions and counseling regarding her condition or for health maintenance advice. Please see specific information pulled into the AVS if appropriate.           Ray Judge MD    11/15/2023

## 2023-11-20 ENCOUNTER — OFFICE VISIT (OUTPATIENT)
Dept: SLEEP MEDICINE | Facility: HOSPITAL | Age: 38
End: 2023-11-20
Payer: OTHER GOVERNMENT

## 2023-11-20 VITALS
OXYGEN SATURATION: 100 % | HEART RATE: 90 BPM | HEIGHT: 64 IN | SYSTOLIC BLOOD PRESSURE: 140 MMHG | WEIGHT: 154 LBS | DIASTOLIC BLOOD PRESSURE: 89 MMHG | BODY MASS INDEX: 26.29 KG/M2

## 2023-11-20 DIAGNOSIS — E66.3 OVERWEIGHT: ICD-10-CM

## 2023-11-20 DIAGNOSIS — M79.7 FIBROMYALGIA: ICD-10-CM

## 2023-11-20 DIAGNOSIS — F51.04 CHRONIC INSOMNIA: Primary | ICD-10-CM

## 2023-11-20 DIAGNOSIS — E61.1 IRON DEFICIENCY: ICD-10-CM

## 2023-11-20 DIAGNOSIS — F32.A DEPRESSION, UNSPECIFIED DEPRESSION TYPE: ICD-10-CM

## 2023-11-20 DIAGNOSIS — F41.9 ANXIETY: ICD-10-CM

## 2023-11-20 PROCEDURE — G0463 HOSPITAL OUTPT CLINIC VISIT: HCPCS

## 2023-11-20 NOTE — PATIENT INSTRUCTIONS
We provided you with a referral for psychology for CBT-I. You are encouraged to do research and contact your personal insurance for a sleep psychologist who will best take your insurance. We are providing you with the following sleep psychologist's information for CBTi who also provides telehealth services: Dr. Kiet Casiano's ([facility], [facility])   [facility]  Address: [address] [place], [place] [address]  Phone: [phone]

## 2023-11-20 NOTE — PROGRESS NOTES
"  [address] [address]  Phone: [phone]  Fax: [phone]      SLEEP CLINIC FOLLOW UP PROGRESS NOTE.    Eloisa Sims  [id number]   1985  38 y.o.  female      PCP: Ling Ignacio APRN      Date of visit: 11/20/2023    Chief Complaint   Patient presents with    Insomnia       Medications and allergies are reviewed by me and documented in the encounter.     SOCIAL (habits pertaining to sleep medicine)  History tobacco use:No   History of alcohol use: non-weekly   Caffeine use: 2 sodas per day  - last drink around lunch time 1 coffee qam and can/bottle soda at lunch    HPI:  This is a 38 y.o.  PMHx of Anxiety, Fibromyalgia, SLE, Factor V Leiden Mutation, Chronic insomnia. Presents to sleep for review of her diagnostic polysomnography which was grossly unremarkable.     -Overall since last visit patient receives as her insomnia as: \"the same\"  -When asked if she's applied any changes recommended with stimulus control counseling last visit: states I don't remember them (She was given a physical list of direction last visit and instructed to review)   Did not try eye mask for excessive light. Continues with screens in bed. External noise from outside persists did not try ear buds.   -She perceives she slept 6 hours night of polysomnography. She took her klonopin that night.  -Since last visit she perceives her insomnia as: the same  -Still taking ramelteon qhs prescribed by her PCP states no improvement in same  -Her epworth this visit and last visit is normal    -10/30/2023 diagnostic polysomnography  AHi 0.5  O2 Tevin 95%  .6 Minutes  Sleep Latency: 27.4 minutes  Sleep Efficiency 82.4%  Comments: Supine sleep was seen.  REM sleep was seen.  Supine REM sleep was seen.  No sleep disordered breathing in supine position.  No sleep disordered breathing in REM. No sleep disordered breathing in supine REM. No sleep disordered breathing " "overall total sleep time.  No sleep-related hypoxia.  No significant snoring.  No bruxism.  Normal PLMS index.  No sleep-related movement disorders.  No NREM parasomnias.  No REM parasomnias.  No epileptiform activity. Patient observed screens in bed: phone versus tablet 7:41 PM- 7:57 PM, again mix of phone versus tablet in bed  8:23 PM to 10 pm.  9:20 PM status post hookup/calibration for polysomnography in bed without any screens sleep onset within 17 minutes. Overall polysomnography normal sleep latency at 27.3 minutes from end of hook up for polysomnography. Appropriate REM latency. Normal sleep efficiency at 82.4%. Standard polysomnographic measures obtained reveal grossly normative relative amounts of sleep. Clinical correlation is suggested for paradoxical insomnia vs psychophysiologic insomnia. Inadequate sleep hygiene is a factor on video polysomnography as stated above.   Diagnosis:  · Overall grossly normal sleep architecture /latency/sleep efficiency   · No sleep disordered breathing  · No snoring  · Inadequate Sleep Hygiene    -overweight - 8/25/2023 consult weight was 158 lbs   Wt Readings from Last 3 Encounters:   11/20/23 69.9 kg (154 lb)   11/15/23 70.1 kg (154 lb 8.7 oz)   10/30/23 71.7 kg (158 lb)         Sleep History - reviewed from consult relatively unchanged any changes are made in bold     Bedtime: 9:15 to 10 PM  Rise time: 5:30-6:30 am  Sleep Latency: 25 to 60 minutes  Awakenings after sleep onset (WASO): 2-3x  Reasons for WASO: At least once nocturia, someone turning the light   Time to return to sleep:  20 minutes, stated as stays in bed thoughts racing and thinks about day                Extrinsic Issues:      I.Adequate opportunity -  Yes     II. circumstances for sleep-  +Noise - Noise from outdoor neighbors disrupts her sleep, still noise from outside \"car horn I'm very sensitive to noise\" verbatim statement  Temperature - denies too hot or too cold  +Light -  Yes son who is 16 years " old will turn the light on from outside her room but it will come into her room and disrupt her sleep - continues   Bedding - denies uncomfortable bedding  Bed Partner -  denies issues with bed partner disrupting sleep  Caffeine -   2 per day (previously 1 per day) ends at lunch time  Alcohol -  denies   Substance use - denies  Screens in bed -  denies   Daytime napping - denies   Daytime use of bed for activities other than sleep - denies   patient unable to identify other extrinsic causes         Intrinsic disturbances endorsed to contribute to sleep initiation or sleep maintenance by patient:   + Pain - Yes sometimes generalized myalgias contributed to her fibromyalgia (denies fevers, chills, night-sweats, unexpected weight loss)  GERD -  denies   Depression -   PHQ2 is Zero last visit   +Anxiety - Takes Klonopin 1-2 days a week half tab 1 mg for several years  +Rumination - Yes; Verbatim patient states brain does not stop thinking in bed  Sleep disordered breathing -  in lab polysomnography complete no sleep disorders     RLS Symptoms:  Does NOT fit ICSD-3 crtieria   Quality: pain, positional  Movement relieves/staying still worse: no/no  Comorbid conditions more likely explainable for same: yes fibromyalgia      Fibromyalgia she takes Milnacipran 12.5 mg twice daily states it is prescribed by rheumatologist who she also sees for SLE  changed to taking after lunch time instead of 6pm after discussing with her rheumatologist - states no improvement with sleep   Has been on this medication for 2 to 2.5 years        REVIEW OF SYSTEMS:   Is negative unless otherwise noted in HPI  [place] Sleepiness Scale :Total score: 6     Disclaimer History: The above history is based on this sleep physician's in room encounter with the patient. Pre encounter self administered questionnaires are taken into consideration and discussed with patient for any discordance. The above documentation by this sleep physician is the most  "accurate clinical information determined by in room sleep physician encounter with patient.     PHYSICAL EXAMINATION:  Vitals:    11/20/23 1500   BP: 140/89   Pulse: 90   SpO2: 100%   Weight: 69.9 kg (154 lb)   Height: 162.6 cm (64.02\")    Body mass index is 26.42 kg/m².   CONSTITUTIONAL: Well appearing, in no overt pain or respiratory distress   NOSE: No septal defect  RESP SYSTEM:  No overt respiratory distress, speaks in clear sentences without dyspnea, no accessory muscle use, no dyspnea  CARDIOVASULAR: No edema noted  NEURO: Oriented x 3, no new or worsening gross focal deficits   Psychiatric: Intermittently guarded with arms crossed during counseling and review of sleep study results     Notes reviewed:      -10/30/2023 office visit PCP TIERRA Chi seen for FMLA  depression wellbutrin and savella   -9/14/23 patient asks PCP for savella refill   -9/9/23 patient asks PCP for wellbutrin refill  -9/7/2023  office visit PCP TIERRA Chi HPI documents \"Sleep doctor for an evaluation but nothign started. He told her needed to continue sleep medication for at least 3 months\" A&P \"Anxiety well controlled with PRN Klonopin use refilled klonopin 1 mg/d\" For insomnia documents not well controlled in the process of worked up by sleep specialty we will trial rozerem for 3 months refilled rozerem 8 mg 2 refills. Nausea OTC iron such as slow fe    Labs reviewed:    -8/25/2023  Ferritin is 85.5 ng/mL    TSAT 13  Placed on ferrous sulfate unable to tolerate due to g.I side effects  With recent unremarkable Polysomnography that included no sleep related movement disorders normal PLMi I do not see a clinical indication to continue iron therapy from a sleep medicine perspective in a patient with history of SLE under the care of an oncologist    Diagnostics reviewed:      -10/30/2023 diagnostic polysomnography  AHi 0.5  O2 Tevin 95%  .6 Minutes  Sleep Latency: 27.4 minutes  Sleep Efficiency " 82.4%    Comments: Supine sleep was seen.  REM sleep was seen.  Supine REM sleep was seen.  No sleep disordered breathing in supine position.  No sleep disordered breathing in REM. No sleep disordered breathing in supine REM. No sleep disordered breathing overall total sleep time.  No sleep-related hypoxia.  No significant snoring.  No bruxism.  Normal PLMS index.  No sleep-related movement disorders.  No NREM parasomnias.  No REM parasomnias.  No epileptiform activity. Patient observed screens in bed: phone versus tablet 7:41 PM- 7:57 PM, again mix of phone versus tablet in bed  8:23 PM to 10 pm.  9:20 PM status post hookup/calibration for polysomnography in bed without any screens sleep onset within 17 minutes. Overall polysomnography normal sleep latency at 27.3 minutes from end of hook up for polysomnography. Appropriate REM latency. Normal sleep efficiency at 82.4%. Standard polysomnographic measures obtained reveal grossly normative relative amounts of sleep. Clinical correlation is suggested for paradoxical insomnia vs psychophysiologic insomnia. Inadequate sleep hygiene is a factor on video polysomnography as stated above.   Diagnosis:  · Overall grossly normal sleep architecture /latency/sleep efficiency   · No sleep disordered breathing  · No snoring  · Inadequate Sleep Hygiene  ----          ASSESSMENT AND PLAN:  Chronic insomnia [F51.04]  -Her polysomnography was grossly unremarkable for any sleep disorders. Furthermore her sleep latency and sleep efficiency were appropriate.  While she took her klonopin this would have decreased her sleep latency but not effected her sleep efficiency with the half life of this medication. Benzodiazepines are also known to suppress slow wave sleep (she had no N3 sleep that night). Psychophysiologic insomnia is likely and insomnia due to mental condition and current medications /medical disorder contributing to insomnia are also likely. Inadequate sleep hygiene is a  definite as observed with screens in bed on night of polysomnography. I counseled her thoroughly physiology of no screens in bed.  -Multiple inadequate circumstances for sleep persists:   First line therapy recommendation for chronic insomnia by [employer] is CBTi:  Referred patient to psychology for CBT-I. I provided patient with the following sleep psychologist's information for CBTi who also provides telehealth services: Dr. Kiet Casiano's ([facility], [facility])   [employer]  Address: [address] #[facility], [place] [id number]  Phone: [phone]  She was also given a [facility] preferred psychologist Dr. Leone. I'd prefer she see Dr. May as he is board certified in CBTi but she may choose. I also encouraged patient to do research and contact their personal insurance for a sleep psychologist if she is unable to schedule with the above two I provided.   -Must treat underlying cause of insomnia, contributing factors in today's sleep clinic visit to include:   Anxiety/Depression if a factor for same currently managed by her PCP, referred to psychiatry Dr. Anderson Valenzuela for evaluation and to give direction on medication management  Regarding medications:  -I'd prefer psychiatry to guide therapy for mood disorders in the setting of chronic insomnia and to give input on trial of sedating antidepressant if deemed appropriate by psychiatrist. I will not allow Sleep Medicine to take over direction for sedating antidepressant with comorbid mood disorder history.   -At this point I recommend she stop Ramelteon as it is not helping this medication does not need wean. Counseled discuss with her prescribing PCP.   -Stimulus control counseling is AASM Standard Recommendation regarding insomnia: counseling reinforced to help establish and strength positive association between falling asleep and bed and bedtime routines. In room counseling provided with the following instructions were  emphasized:  Maintain regular sleep-wake schedule  Avoiding napping   Use bed only for nocturnal sleep or intimacy   Attempt to fall asleep ONLY when sleepy   If unable to fall asleep within 20 minutes leave the bed and pursue a relaxing boring activity in a dark or dimly lit area, then return to bed only when sleepy (This STEP MUST BE REPEATED as often as necessary with emphasis to only attempt to fall asleep in bed when sleepy) - no ereaders no screens  Exercise before 4 pm to build homeostatic sleep drive (avoid exercise at/near bedtime)   OTC Eye mask for external light  OTC Ear buds for external noise   -Patient reported failure of multiple BZRA trials initiated in the past by her PCP. I will not consider any central acting agent for sleep as long as the patient remains on a benzodiazapine.  Benzodiazepines are known to suppress slow wave sleep we have polysomnographic evidence of no slow wave sleep the night her sleep study which she took Klonopin. Ideally she should wean off Klonopin at the guidance of her prescribing physician.   Therapies attempted in the past at the guidance of her PCP were:  Ambien 10 mg  Ambien CR 12.5 mg  Lunesta dose unspecified (caused persistent metallic taste in mouth patient unable to tolerate)  Sonata 5 mg  -I do not consider above therapies as failures. If she weans off Klonopin I recommend restarting on lunesta smallest dose for least frequency/duration. Realistic goals for healthy sleep need to be established with CBTi referral.  Anxiety [F41.9] /Depression [F32.A] psychiatry/psychology referral given. Wellbutrin is activating would appreciate psychiatry input on sedating antidepressant if determined appropriate.   Iron deficiency [E61.1] With clinical correlation in sleep clinic visit and after diagnostic evidence of no sleep related movement disorder I do not see iron therapy as needing to continue from sleep medicine perspective. Follow up with her [facility] physician who she  already has care with for further direction.  Overweight, Healthy lifestyle modification encouraged. Exercise before 4pm will help with sleep.   Fibromyalgia, She changed her second savella dose to lunch time after discussion with her rheumatologist without improvement to sleep problems. Savella is activating. Follow up rheumatologist who prescribes this medication to optimize. This medication can contribute to sleep disturbance. Counseled to discuss with her rheumatologist sedating option medications for this condition. Consider gabapentin to discuss with her rheumatologist.    Disposition: psychiatry referral/ and psychology for CBTi .  Follow up with PCP.   Patient's questions were answered.    Time based visit  100 minutes: to include in room history/exam/extensive counseling/review of plan with patient/pre-encounter review of labs/review of diagnostics/review of prior medical records/ pre encounter same day and in room review of multiple medications reviewed with her in room and direction given for medications /  referral to Psychiatry / referral to psychology with multiple resources for same      EMR Dragon/Transcription disclaimer:   Much of this encounter note is an electronic transcription/translation of spoken language to printed text. The electronic translation of spoken language may permit erroneous, or at times, nonsensical words or phrases to be inadvertently transcribed; Although I have reviewed the note for such errors, some may still exist.       NPI #: [id number]    Flavio Oates, DO  Sleep Medicine  [facility]  11/20/23

## 2023-11-22 ENCOUNTER — TELEPHONE (OUTPATIENT)
Dept: SLEEP MEDICINE | Facility: HOSPITAL | Age: 38
End: 2023-11-22
Payer: OTHER GOVERNMENT

## 2023-11-22 NOTE — TELEPHONE ENCOUNTER
Addendum      The below issue was addressed in my clinic note see same. Plan as previous.    NPI #: [id number]    Flavio Oates, DO  Sleep Medicine  [facility]  11/22/23          -----      Recv'd fax from Dr. Fowler office stating that they do not accept pt's  insurance and that If she wants to become a self pay pt they could see her. I gave pt a call to inform her of this information she states she will call  to see who is in network for her insurance. I reassured pt that once she finds a physician and IF referral is required to reach out to our office and we can have Dr. Oates submit a new referral for the chosen doctor. Pt verbalized understanding.    Care Transition Initial Assessment - RN    Reason For Consult: care coordination/care conference, discharge planning   Met with: Patient and wife.  DATA   Active Problems:    Cellulitis       Cognitive Status: awake, alert and oriented.  [facility]  Primary Care MD Name: magda cordova  Contact information and PCP information verified: Yes  Lives With: spouse            Who is your support system?: Wife       Insurance concerns: Other difficulty paying for medications.  Eliquis and glargine.  Pharmacy liaison will see pt  ASSESSMENT  Patient currently receives the following services:  none        Identified issues/concerns regarding health management: Pt is admitted with cellulitis and now positive blood cultures.  He follows with [facility] and Dr [facility].  He saw endo last month.  He was on metformin previously, but he was having the side effects of a lot of bloating so he went off of metformin.  He is on Glargine BID.  He tried NPH from [employer] in the past, but he said it didn't work for him (more cost effective med).  His endocrinologist currently gives him is Glargine since he can't afford it.  He has a glucometer, but rarely checks his blood sugar because his fingers are so calloused it's hard to get blood.  He has even adjusted his lancing device for depth. Pt's a1c is 9.4 as of 9/6/18.  It was previously 8.8 3/4/18.  Pt's wife said they are looking into seeing if they can get a new insurance plan next year for better coverage, but it's doubtful d/t his medications.  They mostly cook at home.  His wife has DM as well so they are mindful of their intake.        PLAN  Patient given options and choices for discharge YES .  Patient/family is agreeable to the plan?  Yes:   Patient anticipates discharging to home with wife .        Patient anticipates needs for home equipment: No  Plan/Disposition: Home     Care  ([facility]) will continue to follow as  needed.    Denise RICE CTS [phone]

## 2023-12-13 ENCOUNTER — OFFICE VISIT (OUTPATIENT)
Dept: FAMILY MEDICINE CLINIC | Facility: CLINIC | Age: 38
End: 2023-12-13
Payer: OTHER GOVERNMENT

## 2023-12-13 VITALS
SYSTOLIC BLOOD PRESSURE: 125 MMHG | DIASTOLIC BLOOD PRESSURE: 89 MMHG | OXYGEN SATURATION: 100 % | HEIGHT: 64 IN | WEIGHT: 153 LBS | HEART RATE: 101 BPM | BODY MASS INDEX: 26.12 KG/M2

## 2023-12-13 DIAGNOSIS — R09.82 PND (POST-NASAL DRIP): ICD-10-CM

## 2023-12-13 DIAGNOSIS — H69.91 DYSFUNCTION OF RIGHT EUSTACHIAN TUBE: ICD-10-CM

## 2023-12-13 DIAGNOSIS — F51.04 CHRONIC INSOMNIA: Primary | ICD-10-CM

## 2023-12-13 DIAGNOSIS — F41.9 ANXIETY: ICD-10-CM

## 2023-12-13 DIAGNOSIS — F32.A DEPRESSION, UNSPECIFIED DEPRESSION TYPE: ICD-10-CM

## 2023-12-13 PROCEDURE — 99214 OFFICE O/P EST MOD 30 MIN: CPT | Performed by: NURSE PRACTITIONER

## 2023-12-13 RX ORDER — LEVOCETIRIZINE DIHYDROCHLORIDE 5 MG/1
5 TABLET, FILM COATED ORAL EVERY EVENING
Qty: 90 TABLET | Refills: 1 | Status: SHIPPED | OUTPATIENT
Start: 2023-12-13

## 2023-12-13 RX ORDER — ZOLPIDEM TARTRATE 12.5 MG/1
12.5 TABLET, FILM COATED, EXTENDED RELEASE ORAL NIGHTLY PRN
Qty: 30 TABLET | Refills: 2 | Status: SHIPPED | OUTPATIENT
Start: 2023-12-13

## 2023-12-13 RX ORDER — METHYLPREDNISOLONE 4 MG/1
TABLET ORAL
Qty: 1 EACH | Refills: 0 | Status: SHIPPED | OUTPATIENT
Start: 2023-12-13

## 2023-12-13 NOTE — ASSESSMENT & PLAN NOTE
After discussion we have decided to go back and trial the Ambien CR again as this is what patient reports has worked the best, sleep specialist did recommend trialing Lunesta again, patient declines this, states it caused a severe metallic taste that took months to resolve.  We are making new referral for cognitive behavioral therapy per sleep specialist recommendation

## 2023-12-13 NOTE — PROGRESS NOTES
Chief Complaint  Insomnia,     SUBJECTIVE  Eloisa Gala Sims presents to [facility]     Pt here today for 3 month follow up on Insomnia. Pt seen Sleep Specialist Dr. Oates and was recommended to see psychology for CBT-I     Patient also requesting to look at her right ear, states has been having pressure and discomfort off and on, having a lot of drainage, states sometimes feels like the drainage/congestion causes her issues with swallowing/reflux.  History of Present Illness  Past Medical History:   Diagnosis Date    Anxiety YOLIE- 2015    Deep vein thrombosis     Factor 5 Leiden mutation, heterozygous     Fibromyalgia     Fibromyalgia, primary 2019    Insomnia     SLE (systemic lupus erythematosus)       Family History   Problem Relation Age of Onset    Anxiety disorder Mother     Hyperlipidemia Father     Cancer Paternal Grandmother     Hyperlipidemia Paternal Grandmother     Diabetes Paternal Grandfather       Past Surgical History:   Procedure Laterality Date     SECTION      TONSILLECTOMY          Current Outpatient Medications:     apixaban (ELIQUIS) 5 MG tablet tablet, Take 1 tablet by mouth 2 (Two) Times a Day., Disp: 60 tablet, Rfl: 5    buPROPion XL (Wellbutrin XL) 150 MG 24 hr tablet, Take 1 tablet by mouth Daily., Disp: 90 tablet, Rfl: 0    cholecalciferol (VITAMIN D3) 25 MCG (1000 UT) tablet, 1 capsule Daily., Disp: , Rfl:     clonazePAM (KlonoPIN) 1 MG tablet, Take 1 tablet by mouth Daily As Needed for Anxiety., Disp: 30 tablet, Rfl: 0    folic acid (FOLVITE) 1 MG tablet, Take 1 tablet by mouth Daily., Disp: 90 tablet, Rfl: 1    Hydroxychloroquine Sulfate (PLAQUENIL PO), Take 200 mg by mouth Daily., Disp: , Rfl:     Milnacipran HCl (Savella) 12.5 MG tablet, Take 1 tablet by mouth 2 (Two) Times a Day., Disp: 180 tablet, Rfl: 1    Pediatric Multivitamins-Iron (Animal Shapes/Iron) 18 MG chewable tablet, Chew 1 tablet Daily. NOAH, Disp: , Rfl:      "levocetirizine (XYZAL) 5 MG tablet, Take 1 tablet by mouth Every Evening., Disp: 90 tablet, Rfl: 1    methylPREDNISolone (MEDROL) 4 MG dose pack, Take as directed on package instructions., Disp: 1 each, Rfl: 0    zaleplon (SONATA) 5 MG capsule, Take 2 capsules by mouth Every Night. (Patient not taking: Reported on 9/7/2023), Disp: 60 capsule, Rfl: 1    zolpidem CR (Ambien CR) 12.5 MG CR tablet, Take 1 tablet by mouth At Night As Needed for Sleep., Disp: 30 tablet, Rfl: 2    OBJECTIVE  Vital Signs:   /89   Pulse 101   Ht 162.6 cm (64.02\")   Wt 69.4 kg (153 lb)   SpO2 100%   BMI 26.25 kg/m²    Estimated body mass index is 26.25 kg/m² as calculated from the following:    Height as of this encounter: 162.6 cm (64.02\").    Weight as of this encounter: 69.4 kg (153 lb).     Wt Readings from Last 3 Encounters:   12/13/23 69.4 kg (153 lb)   11/20/23 69.9 kg (154 lb)   11/15/23 70.1 kg (154 lb 8.7 oz)     BP Readings from Last 3 Encounters:   12/13/23 125/89   11/20/23 140/89   11/15/23 126/79       Physical Exam  Vitals reviewed.   Constitutional:       Appearance: Normal appearance. She is well-developed.   HENT:      Head: Normocephalic and atraumatic.      Right Ear: External ear normal.      Left Ear: External ear normal.   Eyes:      Conjunctiva/sclera: Conjunctivae normal.      Pupils: Pupils are equal, round, and reactive to light.   Cardiovascular:      Rate and Rhythm: Normal rate and regular rhythm.      Heart sounds: No murmur heard.     No friction rub. No gallop.   Pulmonary:      Effort: Pulmonary effort is normal.      Breath sounds: Normal breath sounds. No wheezing or rhonchi.   Skin:     General: Skin is warm and dry.   Neurological:      Mental Status: She is alert and oriented to person, place, and time.      Cranial Nerves: No cranial nerve deficit.   Psychiatric:         Mood and Affect: Mood and affect normal.         Behavior: Behavior normal.         Thought Content: Thought content " normal.         Judgment: Judgment normal.          Result Review    CMP          4/12/2023    12:19 8/17/2023    10:47 11/15/2023    13:28   CMP   Glucose 94  96  108    BUN 14  12  10    Creatinine 0.79  0.82  0.83    EGFR 98.9  94.6  93.3    Sodium 141  141  137    Potassium 3.7  3.6  2.8    Chloride 108  105  104    Calcium 9.0  8.9  8.9    Total Protein 7.2      Total Protein 7.1  6.8  7.0    Albumin 4.4     3.9  4.2  4.2    Globulin 3.3      Globulin 2.7  2.6  2.8    Total Bilirubin 0.2  <0.2  0.3    Alkaline Phosphatase 83  95  79    AST (SGOT) 14  14  14    ALT (SGPT) 13  13  12    Albumin/Globulin Ratio 1.6  1.6  1.5    BUN/Creatinine Ratio 17.7  14.6  12.0    Anion Gap 8.4  9.8  8.3      CBC          5/18/2023    11:04 8/17/2023    10:47 11/15/2023    13:28   CBC   WBC 2.95  2.34  2.54    RBC 4.32  4.34  4.12    Hemoglobin 12.0  11.9  11.3    Hematocrit 35.8  36.1  34.1    MCV 82.9  83.2  82.8    MCH 27.8  27.4  27.4    MCHC 33.5  33.0  33.1    RDW 13.3  13.2  13.1    Platelets 201  210  198      Lipid Panel          6/7/2023    10:44   Lipid Panel   Total Cholesterol 143    Triglycerides 93    HDL Cholesterol 47    VLDL Cholesterol 17    LDL Cholesterol  79    LDL/HDL Ratio 1.65      TSH          6/7/2023    10:44   TSH   TSH 3.480        Lab Results   Component Value Date    UWVA14OH 26.9 (L) 08/25/2023        Lab Results   Component Value Date    FREET4 1.00 06/07/2023          No Images in the past 120 days found..     The above data has been reviewed by TIERRA Sharma 12/13/2023 11:57 EST.          Patient Care Team:  Ling Igancio APRN as PCP - General (Nurse Practitioner)  Ray Judge MD as Consulting Physician (Hematology and Oncology)            ASSESSMENT & PLAN    Diagnoses and all orders for this visit:    1. Chronic insomnia (Primary)  Assessment & Plan:  After discussion we have decided to go back and trial the Ambien CR again as this is what patient reports has worked the  best, sleep specialist did recommend trialing Lunesta again, patient declines this, states it caused a severe metallic taste that took months to resolve.  We are making new referral for cognitive behavioral therapy per sleep specialist recommendation    Orders:  -     zolpidem CR (Ambien CR) 12.5 MG CR tablet; Take 1 tablet by mouth At Night As Needed for Sleep.  Dispense: 30 tablet; Refill: 2  -     Ambulatory Referral to Psychiatry    2. Anxiety  Overview:  Well-controlled with PRN use Klonopin, patient uses this very sparingly, discussed with the infrequency that she uses it I do not feel like it is a big contributor to her issues with sleep, for now we will continue current dose      3. PND (post-nasal drip)  Comments:  Trial of Flonase daily, switch Zyrtec to Xyzal, and will prescribe Medrol Dosepak, follow-up if no improvement in 2 to 4 weeks, will refer to ENT  Orders:  -     methylPREDNISolone (MEDROL) 4 MG dose pack; Take as directed on package instructions.  Dispense: 1 each; Refill: 0  -     levocetirizine (XYZAL) 5 MG tablet; Take 1 tablet by mouth Every Evening.  Dispense: 90 tablet; Refill: 1    4. Dysfunction of right eustachian tube  Comments:  Trial of Flonase daily, switch Zyrtec to Xyzal, and will prescribe Medrol Dosepak, follow-up if no improvement in 2 to 4 weeks  Orders:  -     methylPREDNISolone (MEDROL) 4 MG dose pack; Take as directed on package instructions.  Dispense: 1 each; Refill: 0  -     levocetirizine (XYZAL) 5 MG tablet; Take 1 tablet by mouth Every Evening.  Dispense: 90 tablet; Refill: 1         Tobacco Use: Low Risk  (12/13/2023)    Patient History     Smoking Tobacco Use: Never     Smokeless Tobacco Use: Never     Passive Exposure: Not on file       Follow Up     Return in about 3 months (around 3/13/2024), or if symptoms worsen or fail to improve.        Patient was given instructions and counseling regarding her condition or for health maintenance advice. Please see specific  information pulled into the AVS if appropriate.   I have reviewed information obtained and documented by others and I have confirmed the accuracy of this documented note.    Ling Ignacio APRN

## 2023-12-14 RX ORDER — BUPROPION HYDROCHLORIDE 150 MG/1
150 TABLET ORAL DAILY
Qty: 90 TABLET | Refills: 0 | Status: SHIPPED | OUTPATIENT
Start: 2023-12-14

## 2023-12-15 DIAGNOSIS — I82.4Y2 ACUTE DEEP VEIN THROMBOSIS (DVT) OF PROXIMAL VEIN OF LEFT LOWER EXTREMITY: ICD-10-CM

## 2023-12-15 DIAGNOSIS — M32.9 SYSTEMIC LUPUS ERYTHEMATOSUS, UNSPECIFIED SLE TYPE, UNSPECIFIED ORGAN INVOLVEMENT STATUS: ICD-10-CM

## 2023-12-15 DIAGNOSIS — D68.52 PROTHROMBIN GENE MUTATION: ICD-10-CM

## 2023-12-15 DIAGNOSIS — D68.51 FACTOR V LEIDEN: ICD-10-CM

## 2023-12-26 DIAGNOSIS — I82.4Y2 ACUTE DEEP VEIN THROMBOSIS (DVT) OF PROXIMAL VEIN OF LEFT LOWER EXTREMITY: ICD-10-CM

## 2023-12-26 DIAGNOSIS — D68.52 PROTHROMBIN GENE MUTATION: ICD-10-CM

## 2023-12-26 DIAGNOSIS — D68.51 FACTOR V LEIDEN: ICD-10-CM

## 2023-12-26 DIAGNOSIS — M32.9 SYSTEMIC LUPUS ERYTHEMATOSUS, UNSPECIFIED SLE TYPE, UNSPECIFIED ORGAN INVOLVEMENT STATUS: ICD-10-CM

## 2023-12-26 NOTE — TELEPHONE ENCOUNTER
Caller: [email or web address]    Relationship: Emergency Contact    Best call back number: [phone]    Requested Prescriptions:   Requested Prescriptions     Pending Prescriptions Disp Refills    apixaban (ELIQUIS) 5 MG tablet tablet 180 tablet 1     Sig: Take 1 tablet by mouth 2 (Two) Times a Day.        Pharmacy where request should be sent: [facility] [address] [phone] [facility] [phone]      Last office visit with prescribing clinician: 11/15/2023   Last telemedicine visit with prescribing clinician: Visit date not found   Next office visit with prescribing clinician: 5/15/2024     Additional details provided by patient:     Does the patient have less than a 3 day supply:  [] Yes  [x] No    Would you like a call back once the refill request has been completed: [] Yes [x] No    If the office needs to give you a call back, can they leave a voicemail: [] Yes [x] No

## 2024-01-15 ENCOUNTER — DOCUMENTATION (OUTPATIENT)
Dept: SLEEP MEDICINE | Facility: CLINIC | Age: 39
End: 2024-01-15
Payer: OTHER GOVERNMENT

## 2024-01-15 NOTE — PROGRESS NOTES
"    Date 1/15/2024    Patient message to sleep staff  She is trying to find a doctor who will work with her insurance.  Many offer CBT however not CBTi.    -11/20/2023 office visit with me reviewed. The above patient concern was addressed that visit:  I provided patient with 2 referrals for CBTi  #1 Dr. Leone [facility] Preferred physician     #2 Dr. Casiano  Same visit documented \"encouraged patient to do research and contact their personal insurance for a sleep psychologist if she is unable to schedule with the above two I provided.\"     -----      I instructed sleep staff to inform the patient the following information:    -There are limited practitioners for CBTi.  I provided her with 2 practitioners known to [facility]. I do not have any alternative providers for her.   -If her insurance does not cover the above practioners then she must follow-up with her insurance for a preferred provider for CBTi or work with her primary care physician for  intervention to help the patient with insurance problems.    - I again recommend and prefer she see Dr. Casiano who is a certified behavioral sleep specialist in  CBTi.  Dr. Casiano is known to work with patients for affordable payment plans.    Dr. Kiet Casiano (PsyD, DBS)  [place]  Address:  [address] [place] [address], [place] [address]  Phone: [phone]    The next step is CBTi. I do not recommend any alternative forms of therapy.    She is to also follow up with Psychiatry Dr. Valenzuela as previously referred by me on 11/20/2023.       NPI #: [id number]    Flavio Oates, DO  Sleep Medicine  [facility]  01/15/24        "

## 2024-03-11 ENCOUNTER — OFFICE VISIT (OUTPATIENT)
Dept: FAMILY MEDICINE CLINIC | Facility: CLINIC | Age: 39
End: 2024-03-11
Payer: OTHER GOVERNMENT

## 2024-03-11 VITALS
DIASTOLIC BLOOD PRESSURE: 89 MMHG | OXYGEN SATURATION: 100 % | BODY MASS INDEX: 27.14 KG/M2 | SYSTOLIC BLOOD PRESSURE: 139 MMHG | WEIGHT: 159 LBS | HEIGHT: 64 IN | HEART RATE: 96 BPM

## 2024-03-11 DIAGNOSIS — F51.04 CHRONIC INSOMNIA: ICD-10-CM

## 2024-03-11 DIAGNOSIS — F41.9 ANXIETY: ICD-10-CM

## 2024-03-11 DIAGNOSIS — F32.A DEPRESSION, UNSPECIFIED DEPRESSION TYPE: ICD-10-CM

## 2024-03-11 PROCEDURE — 99214 OFFICE O/P EST MOD 30 MIN: CPT | Performed by: NURSE PRACTITIONER

## 2024-03-11 RX ORDER — BUPROPION HYDROCHLORIDE 150 MG/1
150 TABLET ORAL DAILY
Qty: 90 TABLET | Refills: 1 | Status: SHIPPED | OUTPATIENT
Start: 2024-03-11

## 2024-03-11 RX ORDER — FOLIC ACID 1 MG/1
1 TABLET ORAL DAILY
Qty: 90 TABLET | Refills: 1 | Status: SHIPPED | OUTPATIENT
Start: 2024-03-11

## 2024-03-11 RX ORDER — ZOLPIDEM TARTRATE 12.5 MG/1
12.5 TABLET, FILM COATED, EXTENDED RELEASE ORAL NIGHTLY PRN
Qty: 30 TABLET | Refills: 2 | Status: SHIPPED | OUTPATIENT
Start: 2024-03-11

## 2024-03-11 RX ORDER — CLONAZEPAM 1 MG/1
1 TABLET ORAL DAILY PRN
Qty: 30 TABLET | Refills: 0 | Status: SHIPPED | OUTPATIENT
Start: 2024-03-11

## 2024-03-11 RX ORDER — MILNACIPRAN HCL 12.5 MG
12.5 TABLET ORAL 2 TIMES DAILY
Qty: 180 TABLET | Refills: 1 | Status: SHIPPED | OUTPATIENT
Start: 2024-03-11 | End: 2024-03-11 | Stop reason: SDUPTHER

## 2024-03-11 RX ORDER — MILNACIPRAN HCL 12.5 MG
12.5 TABLET ORAL 2 TIMES DAILY
Qty: 180 TABLET | Refills: 1 | Status: SHIPPED | OUTPATIENT
Start: 2024-03-11

## 2024-03-11 RX ORDER — ZOLPIDEM TARTRATE 12.5 MG/1
12.5 TABLET, FILM COATED, EXTENDED RELEASE ORAL NIGHTLY PRN
Qty: 30 TABLET | Refills: 2 | Status: SHIPPED | OUTPATIENT
Start: 2024-03-11 | End: 2024-03-11 | Stop reason: SDUPTHER

## 2024-03-11 NOTE — PROGRESS NOTES
Chief Complaint  Anxiety and Insomnia    SUBJECTIVE  Eloisa Sims presents to [facility] for 3 months follow up on anxiety and insomnia.    Patient states doing well overall, doing well on the Ambien, PRN use of Klonopin, does need refill, last filled in September    History of Present Illness  Past Medical History:   Diagnosis Date    Anxiety YOLIE- 2015    Deep vein thrombosis     Factor 5 Leiden mutation, heterozygous     Fibromyalgia     Fibromyalgia, primary 2019    Insomnia     SLE (systemic lupus erythematosus)       Family History   Problem Relation Age of Onset    Anxiety disorder Mother     Hyperlipidemia Father     Cancer Paternal Grandmother     Hyperlipidemia Paternal Grandmother     Diabetes Paternal Grandfather       Past Surgical History:   Procedure Laterality Date     SECTION      TONSILLECTOMY          Current Outpatient Medications:     apixaban (ELIQUIS) 5 MG tablet tablet, Take 1 tablet by mouth 2 (Two) Times a Day., Disp: 180 tablet, Rfl: 1    buPROPion XL (WELLBUTRIN XL) 150 MG 24 hr tablet, Take 1 tablet by mouth Daily., Disp: 90 tablet, Rfl: 1    clonazePAM (KlonoPIN) 1 MG tablet, Take 1 tablet by mouth Daily As Needed for Anxiety., Disp: 30 tablet, Rfl: 0    folic acid (FOLVITE) 1 MG tablet, Take 1 tablet by mouth Daily., Disp: 90 tablet, Rfl: 1    hydrocortisone 2.5 % ointment, APPLY TO EYELIDS TWICE DAILY AS NEEDED FOR FLARES/ITCHING, Disp: , Rfl:     Hydroxychloroquine Sulfate (PLAQUENIL PO), Take 200 mg by mouth Daily., Disp: , Rfl:     levocetirizine (XYZAL) 5 MG tablet, Take 1 tablet by mouth Every Evening., Disp: 90 tablet, Rfl: 1    Milnacipran HCl (Savella) 12.5 MG tablet, Take 1 tablet by mouth 2 (Two) Times a Day., Disp: 180 tablet, Rfl: 1    multivitamin with minerals (MULTIVITAMIN ADULT PO), Take 1 tablet by mouth Daily., Disp: , Rfl:     Pediatric Multivitamins-Iron (Animal Shapes/Iron) 18 MG chewable tablet, Chew 1 tablet  "Daily. NOAH, Disp: , Rfl:     zolpidem CR (Ambien CR) 12.5 MG CR tablet, Take 1 tablet by mouth At Night As Needed for Sleep., Disp: 30 tablet, Rfl: 2    OBJECTIVE  Vital Signs:   /89   Pulse 96   Ht 162.6 cm (64.02\")   Wt 72.1 kg (159 lb)   SpO2 100%   BMI 27.28 kg/m²    Estimated body mass index is 27.28 kg/m² as calculated from the following:    Height as of this encounter: 162.6 cm (64.02\").    Weight as of this encounter: 72.1 kg (159 lb).     Wt Readings from Last 3 Encounters:   03/11/24 72.1 kg (159 lb)   12/13/23 69.4 kg (153 lb)   11/20/23 69.9 kg (154 lb)     BP Readings from Last 3 Encounters:   03/11/24 139/89   12/13/23 125/89   11/20/23 140/89       Physical Exam  Vitals reviewed.   Constitutional:       Appearance: Normal appearance. She is well-developed.   HENT:      Head: Normocephalic and atraumatic.      Right Ear: External ear normal.      Left Ear: External ear normal.   Eyes:      Conjunctiva/sclera: Conjunctivae normal.      Pupils: Pupils are equal, round, and reactive to light.   Cardiovascular:      Rate and Rhythm: Normal rate and regular rhythm.      Heart sounds: No murmur heard.     No friction rub. No gallop.   Pulmonary:      Effort: Pulmonary effort is normal.      Breath sounds: Normal breath sounds. No wheezing or rhonchi.   Skin:     General: Skin is warm and dry.   Neurological:      Mental Status: She is alert and oriented to person, place, and time.      Cranial Nerves: No cranial nerve deficit.   Psychiatric:         Mood and Affect: Mood and affect normal.         Behavior: Behavior normal.         Thought Content: Thought content normal.         Judgment: Judgment normal.          Result Review    CMP          4/12/2023    12:19 8/17/2023    10:47 11/15/2023    13:28   CMP   Glucose 94  96  108    BUN 14  12  10    Creatinine 0.79  0.82  0.83    EGFR 98.9  94.6  93.3    Sodium 141  141  137    Potassium 3.7  3.6  2.8    Chloride 108  105  104    Calcium " 9.0  8.9  8.9    Total Protein 7.2      Total Protein 7.1  6.8  7.0    Albumin 4.4     3.9  4.2  4.2    Globulin 3.3      Globulin 2.7  2.6  2.8    Total Bilirubin 0.2  <0.2  0.3    Alkaline Phosphatase 83  95  79    AST (SGOT) 14  14  14    ALT (SGPT) 13  13  12    Albumin/Globulin Ratio 1.6  1.6  1.5    BUN/Creatinine Ratio 17.7  14.6  12.0    Anion Gap 8.4  9.8  8.3      CBC          5/18/2023    11:04 8/17/2023    10:47 11/15/2023    13:28   CBC   WBC 2.95  2.34  2.54    RBC 4.32  4.34  4.12    Hemoglobin 12.0  11.9  11.3    Hematocrit 35.8  36.1  34.1    MCV 82.9  83.2  82.8    MCH 27.8  27.4  27.4    MCHC 33.5  33.0  33.1    RDW 13.3  13.2  13.1    Platelets 201  210  198      Lipid Panel          6/7/2023    10:44   Lipid Panel   Total Cholesterol 143    Triglycerides 93    HDL Cholesterol 47    VLDL Cholesterol 17    LDL Cholesterol  79    LDL/HDL Ratio 1.65      TSH          6/7/2023    10:44   TSH   TSH 3.480            Lab Results   Component Value Date    JCRX87MD 26.9 (L) 08/25/2023        Lab Results   Component Value Date    FREET4 1.00 06/07/2023          No Images in the past 120 days found..     The above data has been reviewed by TIERRA Sharma 03/11/2024 11:29 EDT.          Patient Care Team:  Ling Ignacio APRN as PCP - General (Nurse Practitioner)  Ray Judge MD as Consulting Physician (Hematology and Oncology)            ASSESSMENT & PLAN    Diagnoses and all orders for this visit:    1. Anxiety  Overview:  Well-controlled with PRN use Klonopin, patient uses this very sparingly, we will continue current dose    Orders:  -     clonazePAM (KlonoPIN) 1 MG tablet; Take 1 tablet by mouth Daily As Needed for Anxiety.  Dispense: 30 tablet; Refill: 0    2. Depression, unspecified depression type  Comments:  Well-controlled with Wellbutrin, continue current dose  Overview:  Stable on Savella and Wellbutrin, continue current medication    Orders:  -     Milnacipran HCl (Savella) 12.5  MG tablet; Take 1 tablet by mouth 2 (Two) Times a Day.  Dispense: 180 tablet; Refill: 1  -     buPROPion XL (WELLBUTRIN XL) 150 MG 24 hr tablet; Take 1 tablet by mouth Daily.  Dispense: 90 tablet; Refill: 1    3. Chronic insomnia  Overview:  Patient currently living with Ambien CR, continue current medication    Orders:  -     zolpidem CR (Ambien CR) 12.5 MG CR tablet; Take 1 tablet by mouth At Night As Needed for Sleep.  Dispense: 30 tablet; Refill: 2    Other orders  -     folic acid (FOLVITE) 1 MG tablet; Take 1 tablet by mouth Daily.  Dispense: 90 tablet; Refill: 1         Tobacco Use: Low Risk  (3/11/2024)    Patient History     Smoking Tobacco Use: Never     Smokeless Tobacco Use: Never     Passive Exposure: Not on file       Follow Up     Return in about 6 months (around 9/11/2024), or if symptoms worsen or fail to improve.        Patient was given instructions and counseling regarding her condition or for health maintenance advice. Please see specific information pulled into the AVS if appropriate.   I have reviewed information obtained and documented by others and I have confirmed the accuracy of this documented note.    TIERRA Sharma

## 2024-04-04 NOTE — PROGRESS NOTES
Chief Complaint  Follow-up anxiety, insomnia, rash  Subjective       SUBJECTIVE  Eloisa Sims presents to [facility]   Patient presents today to follow-up on chronic conditions including chronic fatigue, fibromyalgia, insomnia, anxiety, and is complaining of a rash.  States the rash has been present for 2 weeks, states it is very itchy, has been trying to take allergy medication but it is not helping.  Is requesting a referral to an allergist.  States she does not know what triggered the rash, has had no change that she can think of to explain it.  Pt takes clonazepam as needed, states usually 30 day supply lasts a couple months.  States takes for anxiety, patient also takes Ambien for insomnia.  He is requesting to refill these medications today, only needs refill of Ambien, does not need Klonopin at present.    History of Present Illness  Past Medical History:   Diagnosis Date   • Anxiety -    • Deep vein thrombosis (HCC)    • Fibromyalgia    • Fibromyalgia, primary    • Insomnia    • SLE (systemic lupus erythematosus) (HCC)       Family History   Problem Relation Age of Onset   • Anxiety disorder Mother    • Hyperlipidemia Father    • Cancer Paternal Grandmother    • Hyperlipidemia Paternal Grandmother       Past Surgical History:   Procedure Laterality Date   •  SECTION     • TONSILLECTOMY          Current Outpatient Medications:   •  buPROPion XL (Wellbutrin XL) 150 MG 24 hr tablet, Take 1 tablet by mouth Daily., Disp: 90 tablet, Rfl: 1  •  Milnacipran HCl (Savella) 12.5 MG tablet, Take 90 mg by mouth 2 (Two) Times a Day., Disp: 180 tablet, Rfl: 0  •  apixaban (ELIQUIS) 5 MG tablet tablet, Take 1 tablet by mouth 2 (Two) Times a Day., Disp: 60 tablet, Rfl: 1  •  CLONAZEPAM PO, 1 mg by Other route. Take one tablet by mouth daily PRN, Disp: , Rfl:   •  FOLIC ACID PO, Take 1 mg by mouth Daily., Disp: , Rfl:   •  Hydroxychloroquine Sulfate (PLAQUENIL  "PO), Take 200 mg by mouth Daily., Disp: , Rfl:   •  methylPREDNISolone (MEDROL) 4 MG dose pack, Take as directed on package instructions., Disp: 1 each, Rfl: 0  •  vitamin D (ERGOCALCIFEROL) 1.25 MG (36332 UT) capsule capsule, Take 50,000 Units by mouth 1 (One) Time Per Week., Disp: , Rfl:   •  Zolpidem Tartrate (AMBIEN PO), Take 10 mg by mouth every night at bedtime., Disp: , Rfl:     OBJECTIVE  Vital Signs:   /78   Pulse 106   Ht 162.6 cm (64\")   Wt 72.7 kg (160 lb 3.2 oz)   LMP 08/06/2022 (Approximate)   SpO2 100%   BMI 27.50 kg/m²    Estimated body mass index is 27.5 kg/m² as calculated from the following:    Height as of this encounter: 162.6 cm (64\").    Weight as of this encounter: 72.7 kg (160 lb 3.2 oz).     Wt Readings from Last 3 Encounters:   09/06/22 72.7 kg (160 lb 3.2 oz)   07/13/22 72.3 kg (159 lb 6.3 oz)   07/07/22 73 kg (161 lb)     BP Readings from Last 3 Encounters:   09/06/22 126/78   07/13/22 152/86   07/07/22 120/84       Physical Exam  Vitals reviewed.   Constitutional:       Appearance: Normal appearance. She is well-developed.   HENT:      Head: Normocephalic and atraumatic.      Right Ear: External ear normal.      Left Ear: External ear normal.      Mouth/Throat:      Pharynx: No oropharyngeal exudate.   Eyes:      Conjunctiva/sclera: Conjunctivae normal.      Pupils: Pupils are equal, round, and reactive to light.   Cardiovascular:      Rate and Rhythm: Normal rate and regular rhythm.      Heart sounds: No murmur heard.    No friction rub. No gallop.   Pulmonary:      Effort: Pulmonary effort is normal.      Breath sounds: Normal breath sounds. No wheezing or rhonchi.   Abdominal:      General: Bowel sounds are normal. There is no distension.      Palpations: Abdomen is soft.      Tenderness: There is no abdominal tenderness.   Skin:     General: Skin is warm and dry.      Comments: Erythematous maculopapular rash noted across abdomen, back, few lesions on neck and chest "   Neurological:      Mental Status: She is alert and oriented to person, place, and time.      Cranial Nerves: No cranial nerve deficit.   Psychiatric:         Mood and Affect: Mood and affect normal.         Behavior: Behavior normal.         Thought Content: Thought content normal.         Judgment: Judgment normal.          Result Review    CMP    CMP 7/13/22   Glucose 92   BUN 12   Creatinine 0.77   Sodium 139   Potassium 3.3 (A)   Chloride 105   Calcium 9.0   Albumin 4.41   Total Bilirubin 0.4   Alkaline Phosphatase 62   AST (SGOT) 22   ALT (SGPT) 21   (A) Abnormal value            CBC    CBC 7/13/22   WBC 2.26 (A)   RBC 4.30   Hemoglobin 11.8 (A)   Hematocrit 34.5   MCV 80.2   MCH 27.4   MCHC 34.2   RDW 13.3   Platelets 159   (A) Abnormal value              No Images in the past 120 days found..     The above data has been reviewed by TIERRA Sharma 09/06/2022 10:02 EDT.          Patient Care Team:  Ling Ignacio APRN as PCP - General (Nurse Practitioner)         ASSESSMENT & PLAN    Diagnoses and all orders for this visit:    1. Rash (Primary)  -     Ambulatory Referral to Allergy  -     methylPREDNISolone (MEDROL) 4 MG dose pack; Take as directed on package instructions.  Dispense: 1 each; Refill: 0    2. Depression, unspecified depression type  Assessment & Plan:  Patient states that she can tell an improvement since starting Wellbutrin with her fatigue, she would like to continue this medication.  We will continued at the current dose.    Orders:  -     buPROPion XL (Wellbutrin XL) 150 MG 24 hr tablet; Take 1 tablet by mouth Daily.  Dispense: 90 tablet; Refill: 1  -     Milnacipran HCl (Savella) 12.5 MG tablet; Take 90 mg by mouth 2 (Two) Times a Day.  Dispense: 180 tablet; Refill: 0    3. Insomnia, unspecified type  Assessment & Plan:  Well-controlled with Ambien, continue current dose, [place] and UDS up-to-date, controlled substance contract completed    Orders:  -     POC Urine Drug  Screen [facility]    4. Anxiety  Assessment & Plan:  Well-controlled with as needed use of Klonopin, continue current medications    Orders:  -     POC Urine Drug Screen [facility]       Tobacco Use: Low Risk    • Smoking Tobacco Use: Never Smoker   • Smokeless Tobacco Use: Never Used       Follow Up     Return in about 3 months (around 12/6/2022).        Patient was given instructions and counseling regarding her condition or for health maintenance advice. Please see specific information pulled into the AVS if appropriate.   I have reviewed information obtained and documented by others and I have confirmed the accuracy of this documented note.    Ling Ignacio, APRN         Yes never

## 2024-05-15 ENCOUNTER — LAB (OUTPATIENT)
Dept: ONCOLOGY | Facility: HOSPITAL | Age: 39
End: 2024-05-15
Payer: OTHER GOVERNMENT

## 2024-05-15 ENCOUNTER — OFFICE VISIT (OUTPATIENT)
Dept: ONCOLOGY | Facility: HOSPITAL | Age: 39
End: 2024-05-15
Payer: OTHER GOVERNMENT

## 2024-05-15 VITALS
TEMPERATURE: 97.5 F | OXYGEN SATURATION: 100 % | BODY MASS INDEX: 27.36 KG/M2 | SYSTOLIC BLOOD PRESSURE: 131 MMHG | HEIGHT: 64 IN | HEART RATE: 92 BPM | DIASTOLIC BLOOD PRESSURE: 89 MMHG | RESPIRATION RATE: 18 BRPM | WEIGHT: 160.27 LBS

## 2024-05-15 DIAGNOSIS — D72.819 LEUKOPENIA, UNSPECIFIED TYPE: Primary | ICD-10-CM

## 2024-05-15 DIAGNOSIS — D68.52 PROTHROMBIN GENE MUTATION: ICD-10-CM

## 2024-05-15 DIAGNOSIS — D72.819 LEUKOPENIA, UNSPECIFIED TYPE: ICD-10-CM

## 2024-05-15 DIAGNOSIS — D68.51 FACTOR V LEIDEN: ICD-10-CM

## 2024-05-15 DIAGNOSIS — M32.9 SYSTEMIC LUPUS ERYTHEMATOSUS, UNSPECIFIED SLE TYPE, UNSPECIFIED ORGAN INVOLVEMENT STATUS: ICD-10-CM

## 2024-05-15 LAB
ALBUMIN SERPL-MCNC: 4.2 G/DL (ref 3.5–5.2)
ALBUMIN/GLOB SERPL: 1.3 G/DL
ALP SERPL-CCNC: 68 U/L (ref 39–117)
ALT SERPL W P-5'-P-CCNC: 10 U/L (ref 1–33)
ANION GAP SERPL CALCULATED.3IONS-SCNC: 11 MMOL/L (ref 5–15)
AST SERPL-CCNC: 12 U/L (ref 1–32)
BASOPHILS # BLD AUTO: 0.02 10*3/MM3 (ref 0–0.2)
BASOPHILS NFR BLD AUTO: 0.6 % (ref 0–1.5)
BILIRUB SERPL-MCNC: 0.2 MG/DL (ref 0–1.2)
BUN SERPL-MCNC: 19 MG/DL (ref 6–20)
BUN/CREAT SERPL: 24.7 (ref 7–25)
CALCIUM SPEC-SCNC: 9.4 MG/DL (ref 8.6–10.5)
CHLORIDE SERPL-SCNC: 103 MMOL/L (ref 98–107)
CO2 SERPL-SCNC: 25 MMOL/L (ref 22–29)
CREAT SERPL-MCNC: 0.77 MG/DL (ref 0.57–1)
DEPRECATED RDW RBC AUTO: 42.5 FL (ref 37–54)
EGFRCR SERPLBLD CKD-EPI 2021: 101.4 ML/MIN/1.73
EOSINOPHIL # BLD AUTO: 0.05 10*3/MM3 (ref 0–0.4)
EOSINOPHIL NFR BLD AUTO: 1.4 % (ref 0.3–6.2)
ERYTHROCYTE [DISTWIDTH] IN BLOOD BY AUTOMATED COUNT: 13.8 % (ref 12.3–15.4)
GLOBULIN UR ELPH-MCNC: 3.3 GM/DL
GLUCOSE SERPL-MCNC: 92 MG/DL (ref 65–99)
HCT VFR BLD AUTO: 36.2 % (ref 34–46.6)
HGB BLD-MCNC: 12 G/DL (ref 12–15.9)
IMM GRANULOCYTES # BLD AUTO: 0.01 10*3/MM3 (ref 0–0.05)
IMM GRANULOCYTES NFR BLD AUTO: 0.3 % (ref 0–0.5)
LYMPHOCYTES # BLD AUTO: 0.77 10*3/MM3 (ref 0.7–3.1)
LYMPHOCYTES NFR BLD AUTO: 22.3 % (ref 19.6–45.3)
MCH RBC QN AUTO: 28.2 PG (ref 26.6–33)
MCHC RBC AUTO-ENTMCNC: 33.1 G/DL (ref 31.5–35.7)
MCV RBC AUTO: 85 FL (ref 79–97)
MONOCYTES # BLD AUTO: 0.47 10*3/MM3 (ref 0.1–0.9)
MONOCYTES NFR BLD AUTO: 13.6 % (ref 5–12)
NEUTROPHILS NFR BLD AUTO: 2.14 10*3/MM3 (ref 1.7–7)
NEUTROPHILS NFR BLD AUTO: 61.8 % (ref 42.7–76)
PLATELET # BLD AUTO: 202 10*3/MM3 (ref 140–450)
PMV BLD AUTO: 9.8 FL (ref 6–12)
POTASSIUM SERPL-SCNC: 3.8 MMOL/L (ref 3.5–5.2)
PROT SERPL-MCNC: 7.5 G/DL (ref 6–8.5)
RBC # BLD AUTO: 4.26 10*6/MM3 (ref 3.77–5.28)
SODIUM SERPL-SCNC: 139 MMOL/L (ref 136–145)
WBC NRBC COR # BLD AUTO: 3.46 10*3/MM3 (ref 3.4–10.8)

## 2024-05-15 PROCEDURE — 80053 COMPREHEN METABOLIC PANEL: CPT

## 2024-05-15 PROCEDURE — 85025 COMPLETE CBC W/AUTO DIFF WBC: CPT

## 2024-05-15 PROCEDURE — 36415 COLL VENOUS BLD VENIPUNCTURE: CPT

## 2024-05-15 PROCEDURE — G0463 HOSPITAL OUTPT CLINIC VISIT: HCPCS | Performed by: INTERNAL MEDICINE

## 2024-05-15 NOTE — PROGRESS NOTES
Chief Complaint  FOLLOW UP 2 - Systemic lupus erythematosus    Ling Ignacio, AP*  Ling Ignacio, APRN      Subjective      Diagnosis: Heterozygous Prothrombin mutation and Heterozygous Factor V Leiden mutation    H/o Lupus under management of Rheumatologist Dr. Patton    Leukopenia-active surveillance    Current Treatment: Eliquis BID-for thrombophilia    Previous Treatment: None        Eloisa Sims presents to [facility] for follow up on lab results and refill for Eliquis.     History of Present Illness   Ms. Sims was seen previously in consultation in July with Dr. Ch after developing a left upper leg DVT after a fall. She has had a previous clot in the right lower extremity. She was evaluated for genetic defects with the hypercoagulable state.     Factor II level was found to be heterozygous as well as factor V leiden was heterozygous. She also has lupus diagnosis and has not seen her rheumatology yet but scheduled for later in November. She is compliant with the daily Eliquis she is taking BID dosing. She reports her  PCP wants us to prescribe anti-coagulation.     Interval History: Patient presents for follow-up for management of anticoagulation given her history of DVT as well as diagnosis of heterozygous prothrombin mutation and heterozygous factor V Leiden mutation.  Patient again denies any blood clots since last clinic appointment.  She again denies any blood loss or melena.  She reports tolerating Eliquis and compliance with this medication. Pt reports upcoming appointment with her rheumatologist to address lupus. Pt continues to take Plaquenil for lupus.  Denies any change in medications for lupus treatment.    Oncology/Hematology History    No history exists.       Review of Systems   Constitutional:  Positive for fatigue (6/10). Negative for appetite change, diaphoresis, fever, unexpected weight gain and unexpected weight loss.   HENT:  Negative.  Negative for hearing loss, mouth sores, sore throat, swollen glands, trouble swallowing and voice change.    Eyes: Negative.  Negative for blurred vision.   Respiratory: Negative.  Negative for cough, shortness of breath and wheezing.    Cardiovascular: Negative.  Negative for chest pain and palpitations.   Gastrointestinal: Negative.  Negative for abdominal pain, blood in stool, constipation, diarrhea, nausea and vomiting.   Endocrine: Negative.  Negative for cold intolerance and heat intolerance.   Genitourinary: Negative.  Negative for difficulty urinating, dysuria, frequency, hematuria and urinary incontinence.   Musculoskeletal: Negative.  Negative for arthralgias, back pain and myalgias.   Skin:  Negative for rash, skin lesions and wound.   Allergic/Immunologic: Negative.    Neurological: Negative.  Negative for dizziness, seizures, weakness, numbness and headache.   Hematological: Negative.  Does not bruise/bleed easily.   Psychiatric/Behavioral: Negative.  Negative for depressed mood. The patient is not nervous/anxious.    All other systems reviewed and are negative.      Current Outpatient Medications on File Prior to Visit   Medication Sig Dispense Refill    apixaban (ELIQUIS) 5 MG tablet tablet Take 1 tablet by mouth 2 (Two) Times a Day. 180 tablet 1    buPROPion XL (WELLBUTRIN XL) 150 MG 24 hr tablet Take 1 tablet by mouth Daily. 90 tablet 1    clonazePAM (KlonoPIN) 1 MG tablet Take 1 tablet by mouth Daily As Needed for Anxiety. 30 tablet 0    folic acid (FOLVITE) 1 MG tablet Take 1 tablet by mouth Daily. 90 tablet 1    hydrocortisone 2.5 % ointment APPLY TO EYELIDS TWICE DAILY AS NEEDED FOR FLARES/ITCHING      Hydroxychloroquine Sulfate (PLAQUENIL PO) Take 200 mg by mouth Daily.      levocetirizine (XYZAL) 5 MG tablet Take 1 tablet by mouth Every Evening. 90 tablet 1    Milnacipran HCl (Savella) 12.5 MG tablet Take 1 tablet by mouth 2 (Two) Times a Day. 180 tablet 1    multivitamin with  minerals (MULTIVITAMIN ADULT PO) Take 1 tablet by mouth Daily.      Pediatric Multivitamins-Iron (Animal Shapes/Iron) 18 MG chewable tablet Chew 1 tablet Daily. FLINSTONES      zolpidem CR (Ambien CR) 12.5 MG CR tablet Take 1 tablet by mouth At Night As Needed for Sleep. 30 tablet 2     No current facility-administered medications on file prior to visit.       Allergies   Allergen Reactions    Penicillins Hives    Sulfa Antibiotics Hives     Past Medical History:   Diagnosis Date    Anxiety YOLIE- 2015    Deep vein thrombosis     Factor 5 Leiden mutation, heterozygous     Fibromyalgia     Fibromyalgia, primary 2019    Insomnia     SLE (systemic lupus erythematosus)      Past Surgical History:   Procedure Laterality Date     SECTION      TONSILLECTOMY       Social History     Socioeconomic History    Marital status:    Tobacco Use    Smoking status: Never    Smokeless tobacco: Never   Vaping Use    Vaping status: Never Used   Substance and Sexual Activity    Alcohol use: Yes     Alcohol/week: 1.0 standard drink of alcohol     Types: 1 Glasses of wine per week     Comment: social/rare drinker of wine    Drug use: Never    Sexual activity: Yes     Partners: Male     Comment:  had vasectomy     Family History   Problem Relation Age of Onset    Anxiety disorder Mother     Hyperlipidemia Father     Cancer Paternal Grandmother     Hyperlipidemia Paternal Grandmother     Diabetes Paternal Grandfather        There is no immunization history on file for this patient.    Objective   Physical Exam  Vitals reviewed. Exam conducted with a chaperone present.   Constitutional:       Appearance: Normal appearance. She is normal weight.   HENT:      Head: Normocephalic.   Eyes:      Pupils: Pupils are equal, round, and reactive to light.   Pulmonary:      Effort: Pulmonary effort is normal.   Musculoskeletal:         General: Normal range of motion.      Cervical back: Normal range of motion and neck  "supple.   Neurological:      General: No focal deficit present.      Mental Status: She is alert and oriented to person, place, and time.         Vitals:    05/15/24 1409   BP: 131/89   Pulse: 92   Resp: 18   Temp: 97.5 °F (36.4 °C)   TempSrc: Temporal   SpO2: 100%   Weight: 72.7 kg (160 lb 4.4 oz)   Height: 162.6 cm (64.02\")   PainSc: 0-No pain         ECOG score: 0         ECOG: (0) Fully Active - Able to Carry On All Pre-disease Performance Without Restriction  Fall Risk Assessment was completed, and patient is at low risk for falls.  PHQ-9 Total Score:         The patient is  experiencing fatigue. Fatigue score: 7          Result Review :   The following data was reviewed by: Ray Judge MD on 09/07/2022:  Lab Results   Component Value Date    HGB 12.0 05/15/2024    HCT 36.2 05/15/2024    MCV 85.0 05/15/2024     05/15/2024    WBC 3.46 05/15/2024    NEUTROABS 2.14 05/15/2024    LYMPHSABS 0.77 05/15/2024    MONOSABS 0.47 05/15/2024    EOSABS 0.05 05/15/2024    BASOSABS 0.02 05/15/2024     Lab Results   Component Value Date    GLUCOSE 92 05/15/2024    BUN 19 05/15/2024    CREATININE 0.77 05/15/2024     05/15/2024    K 3.8 05/15/2024     05/15/2024    CO2 25.0 05/15/2024    CALCIUM 9.4 05/15/2024    PROTEINTOT 7.5 05/15/2024    ALBUMIN 4.2 05/15/2024    BILITOT 0.2 05/15/2024    ALKPHOS 68 05/15/2024    AST 12 05/15/2024    ALT 10 05/15/2024          Assessment and Plan    Diagnoses and all orders for this visit:    1. Leukopenia, unspecified type (Primary)  -     CBC & Differential; Future  -     Comprehensive Metabolic Panel; Future    2. Systemic lupus erythematosus, unspecified SLE type, unspecified organ involvement status  -     CBC & Differential; Future  -     Comprehensive Metabolic Panel; Future             She is positive for heterozygous prothrombin gene mutation as well as heterozygous FVL. Given her history of 2 DVTs, along with history of heterozygous prothrombin gene mutation as " well as heterozygous factor V Leiden mutation, I would recommend lifelong anti-coagulation with Eliquis.   She reports her PCP would like for us to refill her Eliquis.   Eliquis refills, pt is to contact us when she requires refill of this medication    Leukopenia: Discussed most recent CBC from 5/15/2024, was normal also CMP from same date was normal. Patient with some occasional infections during lupus flares, but without any other B symptoms.  No recent  infection or lupus flares recently.  Recently results of flow cytometry obtained on peripheral blood at previous clinic appointment revealed B cells that show nonspecific light chain binding.  Thus, obtained lab work to assess levels of immunoglobulins as well as SPEP, free light chains, immunofixation (SPEP was negative for M spike, K/L FLC ratio was 1.26- normal from 4/2023) since this work-up was unremarkable suspect could be secondary to lupus medications prescribed by rheumatologist and/or secondary to her autoimmune conditions. Also, immunoglobulin levels form 4/12/23 were within normal limits. Recommend pt follow up with her Rheumatologist as scheduled for continued management of lupus. Will continue to monitor blood counts.    Plan for patient to follow-up in 6 months to recheck lab values with CBC and CMP.     Would consider BMBx if pt with worsening cytopenias on repeat blood work.     Patient verbalized understanding and agreement with plan.          I spent 30 minutes caring for Eloisa on this date of service. This time includes time spent by me in the following activities:preparing for the visit, reviewing tests, obtaining and/or reviewing a separately obtained history, performing a medically appropriate examination and/or evaluation , counseling and educating the patient/family/caregiver, ordering medications, tests, or procedures, referring and communicating with other health care professionals , documenting information in the medical record,  independently interpreting results and communicating that information with the patient/family/caregiver and care coordination        Patient was given instructions and counseling regarding her condition or for health maintenance advice. Please see specific information pulled into the AVS if appropriate.           Ray Judge MD    5/15/2024    Electronically signed by Ray Judge MD, 05/15/24, 4:51 PM EDT.

## 2024-05-21 ENCOUNTER — OFFICE VISIT (OUTPATIENT)
Dept: FAMILY MEDICINE CLINIC | Facility: CLINIC | Age: 39
End: 2024-05-21
Payer: OTHER GOVERNMENT

## 2024-05-21 VITALS
WEIGHT: 160 LBS | HEIGHT: 64 IN | DIASTOLIC BLOOD PRESSURE: 77 MMHG | SYSTOLIC BLOOD PRESSURE: 122 MMHG | HEART RATE: 95 BPM | BODY MASS INDEX: 27.31 KG/M2 | OXYGEN SATURATION: 99 %

## 2024-05-21 DIAGNOSIS — T78.40XD ALLERGY, SUBSEQUENT ENCOUNTER: Primary | ICD-10-CM

## 2024-05-21 PROCEDURE — 99213 OFFICE O/P EST LOW 20 MIN: CPT | Performed by: NURSE PRACTITIONER

## 2024-05-21 RX ORDER — LEVOCETIRIZINE DIHYDROCHLORIDE 5 MG/1
5 TABLET, FILM COATED ORAL EVERY EVENING
Qty: 90 TABLET | Refills: 1 | Status: SHIPPED | OUTPATIENT
Start: 2024-05-21

## 2024-05-21 NOTE — PROGRESS NOTES
Chief Complaint  Rash    Subjective            Eloisa Sims presents to [facility]  History of Present Illness  Pt has c/o itching and redness around eyes. Pt states this started this morning. Pt has not used anything topical for this. Pt has used a warm compress. Pt states the (R) eye is worse than the (L) eye. Pt has used in the past hydrocortisone cream in the past from dermatology. Pt reports this did not help. She has not been taking her xyzal as prescribed.   No issues with vision.        Past Medical History:   Diagnosis Date    Anxiety YOLIE- 2015    Deep vein thrombosis     Factor 5 Leiden mutation, heterozygous     Fibromyalgia     Fibromyalgia, primary 2019    Insomnia     SLE (systemic lupus erythematosus)        Allergies   Allergen Reactions    Penicillins Hives    Sulfa Antibiotics Hives        Past Surgical History:   Procedure Laterality Date     SECTION      TONSILLECTOMY          Social History     Tobacco Use    Smoking status: Never    Smokeless tobacco: Never   Substance Use Topics    Alcohol use: Yes     Alcohol/week: 1.0 standard drink of alcohol     Types: 1 Glasses of wine per week     Comment: social/rare drinker of wine       Family History   Problem Relation Age of Onset    Anxiety disorder Mother     Hyperlipidemia Father     Cancer Paternal Grandmother     Hyperlipidemia Paternal Grandmother     Diabetes Paternal Grandfather         Current Outpatient Medications on File Prior to Visit   Medication Sig    apixaban (ELIQUIS) 5 MG tablet tablet Take 1 tablet by mouth 2 (Two) Times a Day.    buPROPion XL (WELLBUTRIN XL) 150 MG 24 hr tablet Take 1 tablet by mouth Daily.    clonazePAM (KlonoPIN) 1 MG tablet Take 1 tablet by mouth Daily As Needed for Anxiety.    folic acid (FOLVITE) 1 MG tablet Take 1 tablet by mouth Daily.    Hydroxychloroquine Sulfate (PLAQUENIL PO) Take 200 mg by mouth Daily.    Milnacipran HCl (Savella) 12.5 MG tablet  "Take 1 tablet by mouth 2 (Two) Times a Day.    multivitamin with minerals (MULTIVITAMIN ADULT PO) Take 1 tablet by mouth Daily.    zolpidem CR (Ambien CR) 12.5 MG CR tablet Take 1 tablet by mouth At Night As Needed for Sleep.    [DISCONTINUED] levocetirizine (XYZAL) 5 MG tablet Take 1 tablet by mouth Every Evening.    [DISCONTINUED] hydrocortisone 2.5 % ointment APPLY TO EYELIDS TWICE DAILY AS NEEDED FOR FLARES/ITCHING (Patient not taking: Reported on 5/21/2024)    [DISCONTINUED] Pediatric Multivitamins-Iron (Animal Shapes/Iron) 18 MG chewable tablet Chew 1 tablet Daily. FLINSTONES (Patient not taking: Reported on 5/21/2024)     No current facility-administered medications on file prior to visit.       Health Maintenance Due   Topic Date Due    TDAP/TD VACCINES (1 - Tdap) Never done    HEPATITIS C SCREENING  Never done    PAP SMEAR  Never done    COVID-19 Vaccine (1 - 2023-24 season) Never done       Objective     /77   Pulse 95   Ht 162.6 cm (64\")   Wt 72.6 kg (160 lb)   SpO2 99%   BMI 27.46 kg/m²       Physical Exam  Constitutional:       General: She is not in acute distress.     Appearance: Normal appearance. She is not ill-appearing.   HENT:      Head: Normocephalic and atraumatic.   Eyes:      General: Lids are normal. Vision grossly intact. Gaze aligned appropriately. Allergic shiner present. No visual field deficit.        Right eye: No discharge.         Left eye: No discharge.      Extraocular Movements: Extraocular movements intact.      Conjunctiva/sclera: Conjunctivae normal.      Pupils: Pupils are equal, round, and reactive to light.      Comments: Mild puffiness below coral eyes R >L   Cardiovascular:      Rate and Rhythm: Normal rate and regular rhythm.      Heart sounds: Normal heart sounds. No murmur heard.  Pulmonary:      Effort: Pulmonary effort is normal. No respiratory distress.      Breath sounds: Normal breath sounds.   Chest:      Chest wall: No tenderness.   Musculoskeletal:    "      General: No swelling or tenderness. Normal range of motion.      Cervical back: Normal range of motion and neck supple.   Skin:     General: Skin is warm and dry.      Findings: No rash.   Neurological:      General: No focal deficit present.      Mental Status: She is alert and oriented to person, place, and time. Mental status is at baseline.      Gait: Gait normal.   Psychiatric:         Mood and Affect: Mood normal.         Behavior: Behavior normal.         Thought Content: Thought content normal.         Judgment: Judgment normal.           Result Review :                           Assessment and Plan        Diagnoses and all orders for this visit:    1. Allergy, subsequent encounter (Primary)  Comments:  advised to take Xyzal daily as prescribed  Orders:  -     levocetirizine (XYZAL) 5 MG tablet; Take 1 tablet by mouth Every Evening.  Dispense: 90 tablet; Refill: 1              Follow Up     Return if symptoms worsen or fail to improve.    Patient was given instructions and counseling regarding her condition or for health maintenance advice. Please see specific information pulled into the AVS if appropriate.     Eloisa A Levi  reports that she has never smoked. She has never used smokeless tobacco.

## 2024-06-10 DIAGNOSIS — F51.04 CHRONIC INSOMNIA: ICD-10-CM

## 2024-06-10 RX ORDER — ZOLPIDEM TARTRATE 12.5 MG/1
12.5 TABLET, FILM COATED, EXTENDED RELEASE ORAL NIGHTLY PRN
Qty: 30 TABLET | Refills: 2 | Status: SHIPPED | OUTPATIENT
Start: 2024-06-10

## 2024-06-10 NOTE — TELEPHONE ENCOUNTER
Caller: RONNI SHI    Relationship: Emergency Contact    Best call back number:     [phone]       Requested Prescriptions:   Requested Prescriptions     Pending Prescriptions Disp Refills    zolpidem CR (Ambien CR) 12.5 MG CR tablet 30 tablet 2     Sig: Take 1 tablet by mouth At Night As Needed for Sleep.        Pharmacy where request should be sent:      [id number] - [facility] [place] - [address] AT [facility] - [phone] [facility] [phone]  [phone]     Last office visit with prescribing clinician: 3/11/2024   Last telemedicine visit with prescribing clinician: Visit date not found   Next office visit with prescribing clinician: 9/11/2024     Additional details provided by patient:      THE PATIENT HAS ONE PILL LEFT     Does the patient have less than a 3 day supply:  [x] Yes  [] No    Would you like a call back once the refill request has been completed: [] Yes [x] No    If the office needs to give you a call back, can they leave a voicemail: [] Yes [x] No    Liseth Medina Rep   06/10/24 11:23 EDT       (4) no limitations

## 2024-06-10 NOTE — TELEPHONE ENCOUNTER
Caller: Eloisa Sims    Relationship to patient: Self    Best call back number: [phone]    Patient is needing: PATIENT CALLING TO CHECK ON THE STATUS OF HER REFILL REQUEST  FOR AMBIEN. PATIENT STATES THEY ARE GOING OUT OF TOWN TOMORROW AND SHE IS WANTING TO HAVE THIS FILLED TODAY IF POSSIBLE.

## 2024-09-02 DIAGNOSIS — F32.A DEPRESSION, UNSPECIFIED DEPRESSION TYPE: ICD-10-CM

## 2024-09-03 RX ORDER — BUPROPION HYDROCHLORIDE 150 MG/1
150 TABLET ORAL DAILY
Qty: 90 TABLET | Refills: 0 | Status: SHIPPED | OUTPATIENT
Start: 2024-09-03

## 2024-09-11 ENCOUNTER — OFFICE VISIT (OUTPATIENT)
Dept: FAMILY MEDICINE CLINIC | Facility: CLINIC | Age: 39
End: 2024-09-11
Payer: OTHER GOVERNMENT

## 2024-09-11 VITALS
DIASTOLIC BLOOD PRESSURE: 81 MMHG | HEART RATE: 94 BPM | HEIGHT: 64 IN | BODY MASS INDEX: 28.51 KG/M2 | SYSTOLIC BLOOD PRESSURE: 138 MMHG | OXYGEN SATURATION: 100 % | WEIGHT: 167 LBS

## 2024-09-11 DIAGNOSIS — F32.A DEPRESSION, UNSPECIFIED DEPRESSION TYPE: ICD-10-CM

## 2024-09-11 DIAGNOSIS — F41.9 ANXIETY: ICD-10-CM

## 2024-09-11 DIAGNOSIS — T78.40XD ALLERGY, SUBSEQUENT ENCOUNTER: ICD-10-CM

## 2024-09-11 DIAGNOSIS — F51.04 CHRONIC INSOMNIA: ICD-10-CM

## 2024-09-11 PROCEDURE — 99214 OFFICE O/P EST MOD 30 MIN: CPT | Performed by: NURSE PRACTITIONER

## 2024-09-11 NOTE — PROGRESS NOTES
Chief Complaint  Anxiety, Depression, and Insomnia    SUBJECTIVE  Eloisa Sims presents to [facility] for six month follow up on Anxiety, Depression, and Insomnia.     Pt has not been able to schedule with therapy for insomnia as there isnt a place local that does it or takes her insurance. Pt states is sleeping okay and would like to close referral, doing fairly well with the Ambien CR currently    History of Present Illness  Past Medical History:   Diagnosis Date    Anxiety YOLIE-     Deep vein thrombosis     Factor 5 Leiden mutation, heterozygous     Fibromyalgia     Fibromyalgia, primary 2019    Insomnia     SLE (systemic lupus erythematosus)       Family History   Problem Relation Age of Onset    Anxiety disorder Mother     Hyperlipidemia Father     Cancer Paternal Grandmother     Hyperlipidemia Paternal Grandmother     Diabetes Paternal Grandfather       Past Surgical History:   Procedure Laterality Date     SECTION      TONSILLECTOMY          Current Outpatient Medications:     apixaban (ELIQUIS) 5 MG tablet tablet, Take 1 tablet by mouth 2 (Two) Times a Day., Disp: 180 tablet, Rfl: 1    buPROPion XL (WELLBUTRIN XL) 150 MG 24 hr tablet, Take 1 tablet by mouth Daily., Disp: 90 tablet, Rfl: 0    clonazePAM (KlonoPIN) 1 MG tablet, Take 1 tablet by mouth Daily As Needed for Anxiety., Disp: 30 tablet, Rfl: 0    folic acid (FOLVITE) 1 MG tablet, Take 1 tablet by mouth Daily., Disp: 90 tablet, Rfl: 1    Hydroxychloroquine Sulfate (PLAQUENIL PO), Take 200 mg by mouth Daily., Disp: , Rfl:     levocetirizine (XYZAL) 5 MG tablet, Take 1 tablet by mouth Every Evening., Disp: 90 tablet, Rfl: 1    Milnacipran HCl (Savella) 12.5 MG tablet, Take 1 tablet by mouth 2 (Two) Times a Day., Disp: 180 tablet, Rfl: 1    multivitamin with minerals (MULTIVITAMIN ADULT PO), Take 1 tablet by mouth Daily., Disp: , Rfl:     zolpidem CR (Ambien CR) 12.5 MG CR tablet, Take 1 tablet by  "mouth At Night As Needed for Sleep., Disp: 30 tablet, Rfl: 2    OBJECTIVE  Vital Signs:   /81   Pulse 94   Ht 162.6 cm (64\")   Wt 75.8 kg (167 lb)   SpO2 100%   BMI 28.67 kg/m²    Estimated body mass index is 28.67 kg/m² as calculated from the following:    Height as of this encounter: 162.6 cm (64\").    Weight as of this encounter: 75.8 kg (167 lb).     Wt Readings from Last 3 Encounters:   09/11/24 75.8 kg (167 lb)   05/21/24 72.6 kg (160 lb)   05/15/24 72.7 kg (160 lb 4.4 oz)     BP Readings from Last 3 Encounters:   09/11/24 138/81   05/21/24 122/77   05/15/24 131/89       Physical Exam  Vitals reviewed.   Constitutional:       Appearance: Normal appearance. She is well-developed.   HENT:      Head: Normocephalic and atraumatic.      Right Ear: External ear normal.      Left Ear: External ear normal.   Eyes:      Conjunctiva/sclera: Conjunctivae normal.      Pupils: Pupils are equal, round, and reactive to light.   Cardiovascular:      Rate and Rhythm: Normal rate and regular rhythm.      Heart sounds: No murmur heard.     No friction rub. No gallop.   Pulmonary:      Effort: Pulmonary effort is normal.      Breath sounds: Normal breath sounds. No wheezing or rhonchi.   Skin:     General: Skin is warm and dry.   Neurological:      Mental Status: She is alert and oriented to person, place, and time.      Cranial Nerves: No cranial nerve deficit.   Psychiatric:         Mood and Affect: Mood and affect normal.         Behavior: Behavior normal.         Thought Content: Thought content normal.         Judgment: Judgment normal.          Result Review    CMP          11/15/2023    13:28 5/15/2024    13:20   CMP   Glucose 108  92    BUN 10  19    Creatinine 0.83  0.77    EGFR 93.3  101.4    Sodium 137  139    Potassium 2.8  3.8    Chloride 104  103    Calcium 8.9  9.4    Total Protein 7.0  7.5    Albumin 4.2  4.2    Globulin 2.8  3.3    Total Bilirubin 0.3  0.2    Alkaline Phosphatase 79  68    AST (SGOT) " 14  12    ALT (SGPT) 12  10    Albumin/Globulin Ratio 1.5  1.3    BUN/Creatinine Ratio 12.0  24.7    Anion Gap 8.3  11.0      CBC          11/15/2023    13:28 5/15/2024    13:20   CBC   WBC 2.54  3.46    RBC 4.12  4.26    Hemoglobin 11.3  12.0    Hematocrit 34.1  36.2    MCV 82.8  85.0    MCH 27.4  28.2    MCHC 33.1  33.1    RDW 13.1  13.8    Platelets 198  202                Lab Results   Component Value Date    SADS18OE 26.9 (L) 08/25/2023        Lab Results   Component Value Date    FREET4 1.00 06/07/2023          No Images in the past 120 days found..     The above data has been reviewed by TIERRA Sharma 09/11/2024 11:49 EDT.          Patient Care Team:  Ling Ignacio APRN as PCP - General (Nurse Practitioner)  Ray Judge MD as Consulting Physician (Hematology and Oncology)  Sara Patton MD as Consulting Physician (Rheumatology)    BMI is >= 25 and <30. (Overweight) The following options were offered after discussion;: exercise counseling/recommendations and nutrition counseling/recommendations       ASSESSMENT & PLAN    Diagnoses and all orders for this visit:    1. Anxiety  Overview:  Well-controlled with PRN use Klonopin, patient uses this very sparingly, we will continue current dose    Orders:  -     clonazePAM (KlonoPIN) 1 MG tablet; Take 1 tablet by mouth Daily As Needed for Anxiety.  Dispense: 30 tablet; Refill: 0    2. Depression, unspecified depression type  Comments:  Well-controlled with Wellbutrin, continue current dose  Overview:  Stable on Savella and Wellbutrin, continue current medication    Orders:  -     buPROPion XL (WELLBUTRIN XL) 150 MG 24 hr tablet; Take 1 tablet by mouth Daily.  Dispense: 90 tablet; Refill: 0  -     Milnacipran HCl (Savella) 12.5 MG tablet; Take 1 tablet by mouth 2 (Two) Times a Day.  Dispense: 180 tablet; Refill: 1    3. Allergy, subsequent encounter  Comments:  advised to take Xyzal daily as prescribed  Orders:  -     levocetirizine  (XYZAL) 5 MG tablet; Take 1 tablet by mouth Every Evening.  Dispense: 90 tablet; Refill: 1    4. Chronic insomnia  Overview:  Patient currently living with Ambien CR, continue current medication    Orders:  -     zolpidem CR (Ambien CR) 12.5 MG CR tablet; Take 1 tablet by mouth At Night As Needed for Sleep.  Dispense: 30 tablet; Refill: 2    Other orders  -     folic acid (FOLVITE) 1 MG tablet; Take 1 tablet by mouth Daily.  Dispense: 90 tablet; Refill: 1         Tobacco Use: Low Risk  (9/11/2024)    Patient History     Smoking Tobacco Use: Never     Smokeless Tobacco Use: Never     Passive Exposure: Not on file       Follow Up     No follow-ups on file.        Patient was given instructions and counseling regarding her condition or for health maintenance advice. Please see specific information pulled into the AVS if appropriate.   I have reviewed information obtained and documented by others and I have confirmed the accuracy of this documented note.    TIERRA Sharma

## 2024-09-12 RX ORDER — CLONAZEPAM 1 MG/1
1 TABLET ORAL DAILY PRN
Qty: 30 TABLET | Refills: 0 | Status: SHIPPED | OUTPATIENT
Start: 2024-09-12

## 2024-09-12 RX ORDER — MILNACIPRAN HCL 12.5 MG
12.5 TABLET ORAL 2 TIMES DAILY
Qty: 180 TABLET | Refills: 1 | Status: SHIPPED | OUTPATIENT
Start: 2024-09-12

## 2024-09-12 RX ORDER — ZOLPIDEM TARTRATE 12.5 MG/1
12.5 TABLET, FILM COATED, EXTENDED RELEASE ORAL NIGHTLY PRN
Qty: 30 TABLET | Refills: 2 | Status: SHIPPED | OUTPATIENT
Start: 2024-09-12

## 2024-09-12 RX ORDER — FOLIC ACID 1 MG/1
1 TABLET ORAL DAILY
Qty: 90 TABLET | Refills: 1 | Status: SHIPPED | OUTPATIENT
Start: 2024-09-12

## 2024-09-12 RX ORDER — LEVOCETIRIZINE DIHYDROCHLORIDE 5 MG/1
5 TABLET, FILM COATED ORAL EVERY EVENING
Qty: 90 TABLET | Refills: 1 | Status: SHIPPED | OUTPATIENT
Start: 2024-09-12

## 2024-09-12 RX ORDER — BUPROPION HYDROCHLORIDE 150 MG/1
150 TABLET ORAL DAILY
Qty: 90 TABLET | Refills: 0 | Status: SHIPPED | OUTPATIENT
Start: 2024-09-12

## 2024-09-26 ENCOUNTER — PATIENT MESSAGE (OUTPATIENT)
Dept: FAMILY MEDICINE CLINIC | Facility: CLINIC | Age: 39
End: 2024-09-26
Payer: OTHER GOVERNMENT

## 2024-09-26 ENCOUNTER — TELEPHONE (OUTPATIENT)
Dept: FAMILY MEDICINE CLINIC | Facility: CLINIC | Age: 39
End: 2024-09-26
Payer: OTHER GOVERNMENT

## 2024-11-18 ENCOUNTER — LAB (OUTPATIENT)
Dept: ONCOLOGY | Facility: HOSPITAL | Age: 39
End: 2024-11-18
Payer: OTHER GOVERNMENT

## 2024-11-18 DIAGNOSIS — D72.819 LEUKOPENIA, UNSPECIFIED TYPE: ICD-10-CM

## 2024-11-18 DIAGNOSIS — M32.9 SYSTEMIC LUPUS ERYTHEMATOSUS, UNSPECIFIED SLE TYPE, UNSPECIFIED ORGAN INVOLVEMENT STATUS: ICD-10-CM

## 2024-11-18 LAB
ALBUMIN SERPL-MCNC: 4.5 G/DL (ref 3.5–5.2)
ALBUMIN/GLOB SERPL: 1.3 G/DL
ALP SERPL-CCNC: 87 U/L (ref 39–117)
ALT SERPL W P-5'-P-CCNC: 13 U/L (ref 1–33)
ANION GAP SERPL CALCULATED.3IONS-SCNC: 11.5 MMOL/L (ref 5–15)
AST SERPL-CCNC: 14 U/L (ref 1–32)
BASOPHILS # BLD AUTO: 0.01 10*3/MM3 (ref 0–0.2)
BASOPHILS NFR BLD AUTO: 0.3 % (ref 0–1.5)
BILIRUB SERPL-MCNC: 0.3 MG/DL (ref 0–1.2)
BUN SERPL-MCNC: 13 MG/DL (ref 6–20)
BUN/CREAT SERPL: 15.5 (ref 7–25)
CALCIUM SPEC-SCNC: 8.9 MG/DL (ref 8.6–10.5)
CHLORIDE SERPL-SCNC: 104 MMOL/L (ref 98–107)
CO2 SERPL-SCNC: 23.5 MMOL/L (ref 22–29)
CREAT SERPL-MCNC: 0.84 MG/DL (ref 0.57–1)
DEPRECATED RDW RBC AUTO: 39.7 FL (ref 37–54)
EGFRCR SERPLBLD CKD-EPI 2021: 91.3 ML/MIN/1.73
EOSINOPHIL # BLD AUTO: 0.01 10*3/MM3 (ref 0–0.4)
EOSINOPHIL NFR BLD AUTO: 0.3 % (ref 0.3–6.2)
ERYTHROCYTE [DISTWIDTH] IN BLOOD BY AUTOMATED COUNT: 13.2 % (ref 12.3–15.4)
GLOBULIN UR ELPH-MCNC: 3.5 GM/DL
GLUCOSE SERPL-MCNC: 101 MG/DL (ref 65–99)
HCT VFR BLD AUTO: 36.4 % (ref 34–46.6)
HGB BLD-MCNC: 12 G/DL (ref 12–15.9)
IMM GRANULOCYTES # BLD AUTO: 0.01 10*3/MM3 (ref 0–0.05)
IMM GRANULOCYTES NFR BLD AUTO: 0.3 % (ref 0–0.5)
LYMPHOCYTES # BLD AUTO: 0.88 10*3/MM3 (ref 0.7–3.1)
LYMPHOCYTES NFR BLD AUTO: 24.4 % (ref 19.6–45.3)
MCH RBC QN AUTO: 27.6 PG (ref 26.6–33)
MCHC RBC AUTO-ENTMCNC: 33 G/DL (ref 31.5–35.7)
MCV RBC AUTO: 83.7 FL (ref 79–97)
MONOCYTES # BLD AUTO: 0.35 10*3/MM3 (ref 0.1–0.9)
MONOCYTES NFR BLD AUTO: 9.7 % (ref 5–12)
NEUTROPHILS NFR BLD AUTO: 2.34 10*3/MM3 (ref 1.7–7)
NEUTROPHILS NFR BLD AUTO: 65 % (ref 42.7–76)
NRBC BLD AUTO-RTO: 0 /100 WBC (ref 0–0.2)
PLATELET # BLD AUTO: 240 10*3/MM3 (ref 140–450)
PMV BLD AUTO: 9.7 FL (ref 6–12)
POTASSIUM SERPL-SCNC: 4 MMOL/L (ref 3.5–5.2)
PROT SERPL-MCNC: 8 G/DL (ref 6–8.5)
RBC # BLD AUTO: 4.35 10*6/MM3 (ref 3.77–5.28)
SODIUM SERPL-SCNC: 139 MMOL/L (ref 136–145)
WBC NRBC COR # BLD AUTO: 3.6 10*3/MM3 (ref 3.4–10.8)

## 2024-11-18 PROCEDURE — 85025 COMPLETE CBC W/AUTO DIFF WBC: CPT

## 2024-11-18 PROCEDURE — 80053 COMPREHEN METABOLIC PANEL: CPT

## 2024-11-18 PROCEDURE — 36415 COLL VENOUS BLD VENIPUNCTURE: CPT

## 2024-11-20 ENCOUNTER — OFFICE VISIT (OUTPATIENT)
Dept: ONCOLOGY | Facility: HOSPITAL | Age: 39
End: 2024-11-20
Payer: OTHER GOVERNMENT

## 2024-11-20 VITALS
HEART RATE: 91 BPM | RESPIRATION RATE: 18 BRPM | HEIGHT: 64 IN | OXYGEN SATURATION: 98 % | TEMPERATURE: 98 F | SYSTOLIC BLOOD PRESSURE: 122 MMHG | DIASTOLIC BLOOD PRESSURE: 93 MMHG | BODY MASS INDEX: 28.71 KG/M2 | WEIGHT: 168.2 LBS

## 2024-11-20 DIAGNOSIS — D68.51 FACTOR V LEIDEN: ICD-10-CM

## 2024-11-20 DIAGNOSIS — D68.52 PROTHROMBIN GENE MUTATION: ICD-10-CM

## 2024-11-20 DIAGNOSIS — M32.9 SYSTEMIC LUPUS ERYTHEMATOSUS, UNSPECIFIED SLE TYPE, UNSPECIFIED ORGAN INVOLVEMENT STATUS: ICD-10-CM

## 2024-11-20 DIAGNOSIS — I82.4Y2 ACUTE DEEP VEIN THROMBOSIS (DVT) OF PROXIMAL VEIN OF LEFT LOWER EXTREMITY: ICD-10-CM

## 2024-11-20 PROCEDURE — G0463 HOSPITAL OUTPT CLINIC VISIT: HCPCS | Performed by: INTERNAL MEDICINE

## 2024-11-20 NOTE — PROGRESS NOTES
Chief Complaint  Leukopenia, unspecified type    Ling Ignacio, AP*  Ling Ignacio, APRN      Subjective      Diagnosis: Heterozygous Prothrombin mutation and Heterozygous Factor V Leiden mutation    H/o Lupus under management of Rheumatologist Dr. Patton    Leukopenia-active surveillance    Current Treatment: Eliquis BID-for thrombophilia    Previous Treatment: None        Eloisa Sims presents to [facility] for follow up on lab results and refill for Eliquis.     History of Present Illness   Ms. Sims was seen previously in consultation in July with Dr. Ch after developing a left upper leg DVT after a fall. She has had a previous clot in the right lower extremity. She was evaluated for genetic defects with the hypercoagulable state.     Factor II level was found to be heterozygous as well as factor V leiden was heterozygous. She also has lupus diagnosis and has not seen her rheumatology yet but scheduled for later in November. She is compliant with the daily Eliquis she is taking BID dosing. She reports her  PCP wants us to prescribe anti-coagulation.     Interval History: Patient presents for follow-up for management of anticoagulation given her history of DVT as well as diagnosis of heterozygous prothrombin mutation and heterozygous factor V Leiden mutation.  She is without any blood clots since last clinic appointment.  No report of any blood loss or melena.  She again reports tolerating Eliquis and compliance with this medication. Pt continues to follo up with her rheumatologist to address lupus. Pt continues to take Plaquenil for Lupus. She denies any change in medications for lupus treatment.    Oncology/Hematology History    No history exists.       Review of Systems   Constitutional:  Positive for fatigue (6/10). Negative for appetite change, diaphoresis, fever, unexpected weight gain and unexpected weight loss.   HENT: Negative.  Negative for  hearing loss, mouth sores, sore throat, swollen glands, trouble swallowing and voice change.    Eyes: Negative.  Negative for blurred vision.   Respiratory: Negative.  Negative for cough, shortness of breath and wheezing.    Cardiovascular: Negative.  Negative for chest pain and palpitations.   Gastrointestinal: Negative.  Negative for abdominal pain, blood in stool, constipation, diarrhea, nausea and vomiting.   Endocrine: Negative.  Negative for cold intolerance and heat intolerance.   Genitourinary: Negative.  Negative for difficulty urinating, dysuria, frequency, hematuria and urinary incontinence.   Musculoskeletal: Negative.  Negative for arthralgias, back pain and myalgias.   Skin:  Negative for rash, skin lesions and wound.   Allergic/Immunologic: Negative.    Neurological: Negative.  Negative for dizziness, seizures, weakness, numbness and headache.   Hematological: Negative.  Does not bruise/bleed easily.   Psychiatric/Behavioral: Negative.  Negative for depressed mood. The patient is not nervous/anxious.    All other systems reviewed and are negative.      Current Outpatient Medications on File Prior to Visit   Medication Sig Dispense Refill    buPROPion XL (WELLBUTRIN XL) 150 MG 24 hr tablet Take 1 tablet by mouth Daily. 90 tablet 0    clonazePAM (KlonoPIN) 1 MG tablet Take 1 tablet by mouth Daily As Needed for Anxiety. 30 tablet 0    folic acid (FOLVITE) 1 MG tablet Take 1 tablet by mouth Daily. 90 tablet 1    Hydroxychloroquine Sulfate (PLAQUENIL PO) Take 200 mg by mouth Daily.      levocetirizine (XYZAL) 5 MG tablet Take 1 tablet by mouth Every Evening. 90 tablet 1    Milnacipran HCl (Savella) 12.5 MG tablet Take 1 tablet by mouth 2 (Two) Times a Day. 180 tablet 1    multivitamin with minerals (MULTIVITAMIN ADULT PO) Take 1 tablet by mouth Daily.      zolpidem CR (Ambien CR) 12.5 MG CR tablet Take 1 tablet by mouth At Night As Needed for Sleep. 30 tablet 2    [DISCONTINUED] apixaban (ELIQUIS) 5 MG  tablet tablet Take 1 tablet by mouth 2 (Two) Times a Day. 180 tablet 1     No current facility-administered medications on file prior to visit.       Allergies   Allergen Reactions    Penicillins Hives    Sulfa Antibiotics Hives     Past Medical History:   Diagnosis Date    Anxiety YOLIE- 2015    Deep vein thrombosis     Factor 5 Leiden mutation, heterozygous     Fibromyalgia     Fibromyalgia, primary 2019    Insomnia     SLE (systemic lupus erythematosus)      Past Surgical History:   Procedure Laterality Date     SECTION      TONSILLECTOMY       Social History     Socioeconomic History    Marital status:    Tobacco Use    Smoking status: Never    Smokeless tobacco: Never   Vaping Use    Vaping status: Never Used   Substance and Sexual Activity    Alcohol use: Yes     Alcohol/week: 1.0 standard drink of alcohol     Types: 1 Glasses of wine per week     Comment: social/rare drinker of wine    Drug use: Never    Sexual activity: Yes     Partners: Male     Comment:  had vasectomy     Family History   Problem Relation Age of Onset    Anxiety disorder Mother     Hyperlipidemia Father     Cancer Paternal Grandmother     Hyperlipidemia Paternal Grandmother     Diabetes Paternal Grandfather        There is no immunization history on file for this patient.    Objective   Physical Exam  Vitals reviewed. Exam conducted with a chaperone present.   Constitutional:       Appearance: Normal appearance. She is normal weight.   HENT:      Head: Normocephalic.   Eyes:      Pupils: Pupils are equal, round, and reactive to light.   Pulmonary:      Effort: Pulmonary effort is normal.   Musculoskeletal:         General: Normal range of motion.      Cervical back: Normal range of motion and neck supple.   Neurological:      General: No focal deficit present.      Mental Status: She is alert and oriented to person, place, and time.         Vitals:    24 1504   BP: 122/93   Pulse: 91   Resp: 18   Temp: 98  "°F (36.7 °C)   TempSrc: Temporal   SpO2: 98%   Weight: 76.3 kg (168 lb 3.2 oz)   Height: 162.6 cm (64\")   PainSc: 0-No pain           ECOG score: 0         ECOG: (0) Fully Active - Able to Carry On All Pre-disease Performance Without Restriction  Fall Risk Assessment was completed, and patient is at low risk for falls.  PHQ-9 Total Score:         The patient is  experiencing fatigue. Fatigue score: 7          Result Review :   The following data was reviewed by: Ray Judge MD on 09/07/2022:  Lab Results   Component Value Date    HGB 12.0 11/18/2024    HCT 36.4 11/18/2024    MCV 83.7 11/18/2024     11/18/2024    WBC 3.60 11/18/2024    NEUTROABS 2.34 11/18/2024    LYMPHSABS 0.88 11/18/2024    MONOSABS 0.35 11/18/2024    EOSABS 0.01 11/18/2024    BASOSABS 0.01 11/18/2024     Lab Results   Component Value Date    GLUCOSE 101 (H) 11/18/2024    BUN 13 11/18/2024    CREATININE 0.84 11/18/2024     11/18/2024    K 4.0 11/18/2024     11/18/2024    CO2 23.5 11/18/2024    CALCIUM 8.9 11/18/2024    PROTEINTOT 8.0 11/18/2024    ALBUMIN 4.5 11/18/2024    BILITOT 0.3 11/18/2024    ALKPHOS 87 11/18/2024    AST 14 11/18/2024    ALT 13 11/18/2024          Assessment and Plan    Diagnoses and all orders for this visit:    1. Factor V Leiden  -     apixaban (ELIQUIS) 5 MG tablet tablet; Take 1 tablet by mouth 2 (Two) Times a Day.  Dispense: 180 tablet; Refill: 1    2. Acute deep vein thrombosis (DVT) of proximal vein of left lower extremity  -     apixaban (ELIQUIS) 5 MG tablet tablet; Take 1 tablet by mouth 2 (Two) Times a Day.  Dispense: 180 tablet; Refill: 1    3. Systemic lupus erythematosus, unspecified SLE type, unspecified organ involvement status  -     CBC & Differential; Future  -     Comprehensive Metabolic Panel; Future  -     apixaban (ELIQUIS) 5 MG tablet tablet; Take 1 tablet by mouth 2 (Two) Times a Day.  Dispense: 180 tablet; Refill: 1    4. Prothrombin gene mutation  -     apixaban (ELIQUIS) 5 MG " tablet tablet; Take 1 tablet by mouth 2 (Two) Times a Day.  Dispense: 180 tablet; Refill: 1               She is positive for heterozygous prothrombin gene mutation as well as heterozygous FVL. Given her history of 2 DVTs, along with history of heterozygous prothrombin gene mutation as well as heterozygous factor V Leiden mutation, I would recommend lifelong anti-coagulation with Eliquis.   She reports her PCP would like for us to refill her Eliquis, provided Eliquis refills today    Leukopenia: Discussed most recent CBC from 11/18/2024, was normal also CMP from same date was normal. No recent  infections, B symptoms, or lupus flares recently.  Recently results of flow cytometry obtained on peripheral blood at previous clinic appointment revealed B cells that show nonspecific light chain binding.  Thus, obtained lab work to assess levels of immunoglobulins as well as SPEP, free light chains, immunofixation (SPEP was negative for M spike, K/L FLC ratio was 1.26- normal from 4/2023) since this work-up was unremarkable suspect could be secondary to lupus medications prescribed by rheumatologist and/or secondary to her autoimmune conditions. Also, immunoglobulin levels form 4/12/23 were within normal limits. Recommend pt follow up with her Rheumatologist as scheduled for continued management of lupus. Will continue to monitor blood counts.    Plan for patient to follow-up in 6 months to recheck lab values with CBC and CMP.     Would consider BMBx if pt with worsening cytopenias on repeat blood work.     Patient verbalized understanding and agreement with plan.    Electronically signed by Ray Judge MD, 11/20/24, 3:26 PM EST.        I spent 30 minutes caring for Eloisa on this date of service. This time includes time spent by me in the following activities:preparing for the visit, reviewing tests, obtaining and/or reviewing a separately obtained history, performing a medically appropriate examination and/or  evaluation , counseling and educating the patient/family/caregiver, ordering medications, tests, or procedures, referring and communicating with other health care professionals , documenting information in the medical record, independently interpreting results and communicating that information with the patient/family/caregiver and care coordination        Patient was given instructions and counseling regarding her condition or for health maintenance advice. Please see specific information pulled into the AVS if appropriate.           Ray Judge MD    11/20/2024

## 2024-12-02 DIAGNOSIS — F32.A DEPRESSION, UNSPECIFIED DEPRESSION TYPE: ICD-10-CM

## 2024-12-02 RX ORDER — BUPROPION HYDROCHLORIDE 150 MG/1
150 TABLET ORAL DAILY
Qty: 90 TABLET | Refills: 0 | Status: SHIPPED | OUTPATIENT
Start: 2024-12-02

## 2024-12-08 DIAGNOSIS — F51.04 CHRONIC INSOMNIA: ICD-10-CM

## 2024-12-09 ENCOUNTER — TELEPHONE (OUTPATIENT)
Dept: FAMILY MEDICINE CLINIC | Facility: CLINIC | Age: 39
End: 2024-12-09
Payer: OTHER GOVERNMENT

## 2024-12-09 RX ORDER — ZOLPIDEM TARTRATE 12.5 MG/1
12.5 TABLET, FILM COATED, EXTENDED RELEASE ORAL NIGHTLY PRN
Qty: 30 TABLET | Refills: 2 | Status: SHIPPED | OUTPATIENT
Start: 2024-12-09

## 2024-12-09 NOTE — TELEPHONE ENCOUNTER
"Relay     \"Unable to prescribe meds without an evaluation. Scheduled tomorrow, will call mid day.\"                "

## 2024-12-09 NOTE — TELEPHONE ENCOUNTER
Caller: RONNI SHI    Relationship: Emergency Contact    Best call back number: [phone]    What medication are you requesting: PREDNISONE AND ALBUTEROL FOR NEBULIZER    What are your current symptoms: N/A    How long have you been experiencing symptoms: N/A    Have you had these symptoms before:    [] Yes  [] No    Have you been treated for these symptoms before:   [] Yes  [] No    If a prescription is needed, what is your preferred pharmacy and phone number: [facility] #[id number] - [place] - [address] [facility] [phone] [facility] [phone] FX     Additional notes:PATIENT TESTED POSITIVE TODAY FOR COVID. FAMILY MEMBER WAS SEEN LAST WEEK AND HAD COVID. HE WAS PRESCRIBED THESE MEDICATIONS.     PLEASE SEND NEW PRESCRIPTIONS TO PHARMACY ASAP. IF THIS IS NOT POSSIBLE WITHOUT AN APPOINTMENT THEY ARE WILLING TO SCHEDULE MYCHART VISIT.

## 2024-12-10 ENCOUNTER — TELEMEDICINE (OUTPATIENT)
Dept: FAMILY MEDICINE CLINIC | Facility: CLINIC | Age: 39
End: 2024-12-10
Payer: OTHER GOVERNMENT

## 2024-12-10 VITALS — HEIGHT: 64 IN | WEIGHT: 168 LBS | BODY MASS INDEX: 28.68 KG/M2

## 2024-12-10 DIAGNOSIS — U07.1 POSITIVE SELF-ADMINISTERED ANTIGEN TEST FOR COVID-19: Primary | ICD-10-CM

## 2024-12-10 DIAGNOSIS — J06.9 UPPER RESPIRATORY TRACT INFECTION, UNSPECIFIED TYPE: ICD-10-CM

## 2024-12-10 PROCEDURE — 99213 OFFICE O/P EST LOW 20 MIN: CPT | Performed by: NURSE PRACTITIONER

## 2024-12-10 RX ORDER — HYDROXYCHLOROQUINE SULFATE 200 MG/1
1 TABLET, FILM COATED ORAL DAILY
COMMUNITY

## 2024-12-10 RX ORDER — METHYLPREDNISOLONE 4 MG/1
TABLET ORAL
Qty: 1 EACH | Refills: 0 | Status: SHIPPED | OUTPATIENT
Start: 2024-12-10

## 2024-12-10 RX ORDER — BROMPHENIRAMINE MALEATE, PSEUDOEPHEDRINE HYDROCHLORIDE, AND DEXTROMETHORPHAN HYDROBROMIDE 2; 30; 10 MG/5ML; MG/5ML; MG/5ML
5 SYRUP ORAL 4 TIMES DAILY PRN
Qty: 118 ML | Refills: 0 | Status: SHIPPED | OUTPATIENT
Start: 2024-12-10

## 2024-12-10 NOTE — PROGRESS NOTES
Chief Complaint  Nasal Congestion, Cough, Fever, and Chills    Mode of Visit: Video  Location of patient: -HOME-  Location of provider: +[facility]  You have chosen to receive care through a telehealth visit.  The patient has signed the video visit consent form.  The visit included audio and video interaction. No technical issues occurred during this visit.    RENETTA Sims presents to [facility]   due to nasal congestion, cough, no appetite. Pt states her son was diagnosed with Covid last week as well.     Onset of symptoms 2 days ago-pt states that  she started feeling congested, then yesterday she started having a lot more congestion, sinus pressure, body aches, low grade fever- home COVID  test was positive.     Pt states was taking OTC sudafed but still having a lot of sinus congestion and pressure.  Denies any shortness of breath, cough is most bothersome at night.  Nonproductive      History of Present Illness  Past Medical History:   Diagnosis Date    Anxiety -     Deep vein thrombosis     Factor 5 Leiden mutation, heterozygous     Fibromyalgia     Fibromyalgia, primary 2019    Insomnia     SLE (systemic lupus erythematosus)       Family History   Problem Relation Age of Onset    Anxiety disorder Mother     Hyperlipidemia Father     Cancer Paternal Grandmother     Hyperlipidemia Paternal Grandmother     Diabetes Paternal Grandfather       Past Surgical History:   Procedure Laterality Date     SECTION      TONSILLECTOMY          Current Outpatient Medications:     apixaban (ELIQUIS) 5 MG tablet tablet, Take 1 tablet by mouth 2 (Two) Times a Day., Disp: 180 tablet, Rfl: 1    buPROPion XL (WELLBUTRIN XL) 150 MG 24 hr tablet, Take 1 tablet by mouth Daily., Disp: 90 tablet, Rfl: 0    clonazePAM (KlonoPIN) 1 MG tablet, Take 1 tablet by mouth Daily As Needed for Anxiety., Disp: 30 tablet, Rfl: 0    folic acid (FOLVITE) 1 MG tablet, Take  "1 tablet by mouth Daily., Disp: 90 tablet, Rfl: 1    levocetirizine (XYZAL) 5 MG tablet, Take 1 tablet by mouth Every Evening., Disp: 90 tablet, Rfl: 1    Milnacipran HCl (Savella) 12.5 MG tablet, Take 1 tablet by mouth 2 (Two) Times a Day., Disp: 180 tablet, Rfl: 1    multivitamin with minerals (MULTIVITAMIN ADULT PO), Take 1 tablet by mouth Daily., Disp: , Rfl:     zolpidem CR (Ambien CR) 12.5 MG CR tablet, Take 1 tablet by mouth At Night As Needed for Sleep., Disp: 30 tablet, Rfl: 2    brompheniramine-pseudoephedrine-DM 30-2-10 MG/5ML syrup, Take 5 mL by mouth 4 (Four) Times a Day As Needed for Cough or Congestion., Disp: 118 mL, Rfl: 0    hydroxychloroquine (PLAQUENIL) 200 MG tablet, Take 1 tablet by mouth Daily., Disp: , Rfl:     methylPREDNISolone (MEDROL) 4 MG dose pack, Take as directed on package instructions., Disp: 1 each, Rfl: 0    OBJECTIVE  Vital Signs:   Ht 162.6 cm (64\")   Wt 76.2 kg (168 lb)   BMI 28.84 kg/m²    Estimated body mass index is 28.84 kg/m² as calculated from the following:    Height as of this encounter: 162.6 cm (64\").    Weight as of this encounter: 76.2 kg (168 lb).     Wt Readings from Last 3 Encounters:   12/10/24 76.2 kg (168 lb)   11/20/24 76.3 kg (168 lb 3.2 oz)   09/11/24 75.8 kg (167 lb)     BP Readings from Last 3 Encounters:   11/20/24 122/93   09/11/24 138/81   05/21/24 122/77       Physical Exam  Constitutional:       General: She is not in acute distress.     Appearance: Normal appearance. She is not toxic-appearing.   HENT:      Head: Normocephalic and atraumatic.   Pulmonary:      Effort: Pulmonary effort is normal.   Neurological:      Mental Status: She is alert and oriented to person, place, and time.   Psychiatric:         Mood and Affect: Mood normal.         Behavior: Behavior normal.         Thought Content: Thought content normal.         Judgment: Judgment normal.          Result Review        No Images in the past 120 days found..     The above data has been " reviewed by TIERRA Sharma 12/10/2024 12:16 EST.          Patient Care Team:  Ling Ignacio APRN as PCP - General (Nurse Practitioner)  Ray Judge MD as Consulting Physician (Hematology and Oncology)  Sara Patton MD as Consulting Physician (Rheumatology)            ASSESSMENT & PLAN    Diagnoses and all orders for this visit:    1. Positive self-administered antigen test for COVID-19 (Primary)    2. Upper respiratory tract infection, unspecified type    Other orders  -     methylPREDNISolone (MEDROL) 4 MG dose pack; Take as directed on package instructions.  Dispense: 1 each; Refill: 0  -     brompheniramine-pseudoephedrine-DM 30-2-10 MG/5ML syrup; Take 5 mL by mouth 4 (Four) Times a Day As Needed for Cough or Congestion.  Dispense: 118 mL; Refill: 0    Discussed that COVID is a viral infection, antibiotics not indicated at this time, we will prescribe Medrol Dosepak to help with symptoms, if patient develops any productive cough, worsening symptoms, or any shortness of breath she is to seek immediate reevaluation.  We did discuss Paxlovid, but patient taking eliquis - declines.  Rest as needed but try to remain active, stay well-hydrated, self quarantine at home until fever free for 24 hours and symptoms are improving    Tobacco Use: Low Risk  (12/10/2024)    Patient History     Smoking Tobacco Use: Never     Smokeless Tobacco Use: Never     Passive Exposure: Not on file       Follow Up     Return if symptoms worsen or fail to improve.        Patient was given instructions and counseling regarding her condition or for health maintenance advice. Please see specific information pulled into the AVS if appropriate.   I have reviewed information obtained and documented by others and I have confirmed the accuracy of this documented note.    TIERRA Sharma

## 2025-03-03 DIAGNOSIS — F32.A DEPRESSION, UNSPECIFIED DEPRESSION TYPE: ICD-10-CM

## 2025-03-03 RX ORDER — BUPROPION HYDROCHLORIDE 150 MG/1
150 TABLET ORAL DAILY
Qty: 90 TABLET | Refills: 0 | Status: SHIPPED | OUTPATIENT
Start: 2025-03-03

## 2025-03-11 ENCOUNTER — OFFICE VISIT (OUTPATIENT)
Dept: FAMILY MEDICINE CLINIC | Facility: CLINIC | Age: 40
End: 2025-03-11
Payer: OTHER GOVERNMENT

## 2025-03-11 VITALS
HEIGHT: 64 IN | WEIGHT: 177 LBS | SYSTOLIC BLOOD PRESSURE: 140 MMHG | DIASTOLIC BLOOD PRESSURE: 85 MMHG | HEART RATE: 94 BPM | BODY MASS INDEX: 30.22 KG/M2 | OXYGEN SATURATION: 100 %

## 2025-03-11 DIAGNOSIS — M79.7 FIBROMYALGIA: Primary | ICD-10-CM

## 2025-03-11 DIAGNOSIS — F32.A DEPRESSION, UNSPECIFIED DEPRESSION TYPE: ICD-10-CM

## 2025-03-11 DIAGNOSIS — R03.0 ELEVATED BLOOD PRESSURE READING: ICD-10-CM

## 2025-03-11 DIAGNOSIS — Z13.220 LIPID SCREENING: ICD-10-CM

## 2025-03-11 DIAGNOSIS — Z13.29 THYROID DISORDER SCREEN: ICD-10-CM

## 2025-03-11 DIAGNOSIS — F51.04 CHRONIC INSOMNIA: ICD-10-CM

## 2025-03-11 DIAGNOSIS — T78.40XD ALLERGY, SUBSEQUENT ENCOUNTER: ICD-10-CM

## 2025-03-11 LAB
AMPHET+METHAMPHET UR QL: NEGATIVE
AMPHETAMINE INTERNAL CONTROL: NORMAL
AMPHETAMINES UR QL: NEGATIVE
BARBITURATE INTERNAL CONTROL: NORMAL
BARBITURATES UR QL SCN: NEGATIVE
BENZODIAZ UR QL SCN: NEGATIVE
BENZODIAZEPINE INTERNAL CONTROL: NORMAL
BUPRENORPHINE INTERNAL CONTROL: NORMAL
BUPRENORPHINE SERPL-MCNC: NEGATIVE NG/ML
CANNABINOIDS SERPL QL: NEGATIVE
COCAINE INTERNAL CONTROL: NORMAL
COCAINE UR QL: NEGATIVE
EXPIRATION DATE: NORMAL
Lab: NORMAL
MDMA (ECSTASY) INTERNAL CONTROL: NORMAL
MDMA UR QL SCN: NEGATIVE
METHADONE INTERNAL CONTROL: NORMAL
METHADONE UR QL SCN: NEGATIVE
METHAMPHETAMINE INTERNAL CONTROL: NORMAL
MORPHINE INTERNAL CONTROL: NORMAL
MORPHINE/OPIATES SCREEN, URINE: NEGATIVE
OXYCODONE INTERNAL CONTROL: NORMAL
OXYCODONE UR QL SCN: NEGATIVE
PCP UR QL SCN: NEGATIVE
PHENCYCLIDINE INTERNAL CONTROL: NORMAL
THC INTERNAL CONTROL: NORMAL

## 2025-03-11 RX ORDER — MILNACIPRAN HCL 12.5 MG
12.5 TABLET ORAL 2 TIMES DAILY
Qty: 180 TABLET | Refills: 1 | Status: SHIPPED | OUTPATIENT
Start: 2025-03-11

## 2025-03-11 RX ORDER — LEVOCETIRIZINE DIHYDROCHLORIDE 5 MG/1
5 TABLET, FILM COATED ORAL EVERY EVENING
Qty: 90 TABLET | Refills: 1 | Status: SHIPPED | OUTPATIENT
Start: 2025-03-11

## 2025-03-11 RX ORDER — BUPROPION HYDROCHLORIDE 150 MG/1
150 TABLET ORAL DAILY
Qty: 90 TABLET | Refills: 0 | Status: SHIPPED | OUTPATIENT
Start: 2025-03-11

## 2025-03-11 RX ORDER — FLUTICASONE PROPIONATE 50 MCG
2 SPRAY, SUSPENSION (ML) NASAL DAILY
Qty: 15.8 G | Refills: 3 | Status: SHIPPED | OUTPATIENT
Start: 2025-03-11

## 2025-03-11 RX ORDER — MILNACIPRAN HCL 12.5 MG
12.5 TABLET ORAL 2 TIMES DAILY
Qty: 180 TABLET | Refills: 1 | Status: SHIPPED | OUTPATIENT
Start: 2025-03-11 | End: 2025-03-11 | Stop reason: SDUPTHER

## 2025-03-11 RX ORDER — ZOLPIDEM TARTRATE 12.5 MG/1
12.5 TABLET, FILM COATED, EXTENDED RELEASE ORAL NIGHTLY PRN
Qty: 30 TABLET | Refills: 2 | Status: SHIPPED | OUTPATIENT
Start: 2025-03-11

## 2025-03-11 NOTE — PROGRESS NOTES
Chief Complaint  Depression, Anxiety, and Insomnia    SUBJECTIVE  Eloisa Sims presents to [facility] for six month follow up on Depression, Anxiety, and Insomnia.     Pt reports doing well with Klonopin, Wellbutrin, and Ambien. UDS updated today.     Pt reports her BP has been trending up, states that she thinks it is due to stress.     History of Present Illness  Past Medical History:   Diagnosis Date    Anxiety YOLIE-     Deep vein thrombosis     Factor 5 Leiden mutation, heterozygous     Fibromyalgia     Fibromyalgia, primary 2019    Insomnia     SLE (systemic lupus erythematosus)       Family History   Problem Relation Age of Onset    Anxiety disorder Mother     Hyperlipidemia Father     Cancer Paternal Grandmother     Hyperlipidemia Paternal Grandmother     Diabetes Paternal Grandfather       Past Surgical History:   Procedure Laterality Date     SECTION      TONSILLECTOMY          Current Outpatient Medications:     apixaban (ELIQUIS) 5 MG tablet tablet, Take 1 tablet by mouth 2 (Two) Times a Day., Disp: 180 tablet, Rfl: 1    buPROPion XL (WELLBUTRIN XL) 150 MG 24 hr tablet, Take 1 tablet by mouth Daily., Disp: 90 tablet, Rfl: 0    clonazePAM (KlonoPIN) 1 MG tablet, Take 1 tablet by mouth Daily As Needed for Anxiety., Disp: 30 tablet, Rfl: 0    folic acid (FOLVITE) 1 MG tablet, Take 1 tablet by mouth Daily., Disp: 90 tablet, Rfl: 1    hydroxychloroquine (PLAQUENIL) 200 MG tablet, Take 1 tablet by mouth Daily., Disp: , Rfl:     levocetirizine (XYZAL) 5 MG tablet, Take 1 tablet by mouth Every Evening., Disp: 90 tablet, Rfl: 1    methylPREDNISolone (MEDROL) 4 MG dose pack, Take as directed on package instructions., Disp: 1 each, Rfl: 0    multivitamin with minerals (MULTIVITAMIN ADULT PO), Take 1 tablet by mouth Daily., Disp: , Rfl:     zolpidem CR (Ambien CR) 12.5 MG CR tablet, Take 1 tablet by mouth At Night As Needed for Sleep., Disp: 30 tablet, Rfl: 2    " fluticasone (FLONASE) 50 MCG/ACT nasal spray, Administer 2 sprays into the nostril(s) as directed by provider Daily., Disp: 15.8 g, Rfl: 3    Milnacipran HCl (Savella) 12.5 MG tablet, Take 1 tablet by mouth 2 (Two) Times a Day., Disp: 180 tablet, Rfl: 1    OBJECTIVE  Vital Signs:   /85   Pulse 94   Ht 162.6 cm (64\")   Wt 80.3 kg (177 lb)   SpO2 100%   BMI 30.38 kg/m²    Estimated body mass index is 30.38 kg/m² as calculated from the following:    Height as of this encounter: 162.6 cm (64\").    Weight as of this encounter: 80.3 kg (177 lb).     Wt Readings from Last 3 Encounters:   03/11/25 80.3 kg (177 lb)   12/10/24 76.2 kg (168 lb)   11/20/24 76.3 kg (168 lb 3.2 oz)     BP Readings from Last 3 Encounters:   03/11/25 140/85   11/20/24 122/93   09/11/24 138/81       Physical Exam  Vitals reviewed.   Constitutional:       Appearance: Normal appearance. She is well-developed.   HENT:      Head: Normocephalic and atraumatic.      Right Ear: External ear normal.      Left Ear: External ear normal.   Eyes:      Conjunctiva/sclera: Conjunctivae normal.      Pupils: Pupils are equal, round, and reactive to light.   Cardiovascular:      Rate and Rhythm: Normal rate and regular rhythm.      Heart sounds: No murmur heard.     No friction rub. No gallop.   Pulmonary:      Effort: Pulmonary effort is normal.      Breath sounds: Normal breath sounds. No wheezing or rhonchi.   Skin:     General: Skin is warm and dry.   Neurological:      Mental Status: She is alert and oriented to person, place, and time.      Cranial Nerves: No cranial nerve deficit.   Psychiatric:         Mood and Affect: Mood and affect normal.         Behavior: Behavior normal.         Thought Content: Thought content normal.         Judgment: Judgment normal.          Result Review    CMP          5/15/2024    13:20 11/18/2024    15:17   CMP   Glucose 92  101    BUN 19  13    Creatinine 0.77  0.84    EGFR 101.4  91.3    Sodium 139  139  "   Potassium 3.8  4.0    Chloride 103  104    Calcium 9.4  8.9    Total Protein 7.5  8.0    Albumin 4.2  4.5    Globulin 3.3  3.5    Total Bilirubin 0.2  0.3    Alkaline Phosphatase 68  87    AST (SGOT) 12  14    ALT (SGPT) 10  13    Albumin/Globulin Ratio 1.3  1.3    BUN/Creatinine Ratio 24.7  15.5    Anion Gap 11.0  11.5      CBC          5/15/2024    13:20 11/18/2024    15:17   CBC   WBC 3.46  3.60    RBC 4.26  4.35    Hemoglobin 12.0  12.0    Hematocrit 36.2  36.4    MCV 85.0  83.7    MCH 28.2  27.6    MCHC 33.1  33.0    RDW 13.8  13.2    Platelets 202  240              No Images in the past 120 days found..     The above data has been reviewed by TIERRA Sharma 03/11/2025 11:35 EDT.          Patient Care Team:  Ling Ignacio APRN as PCP - General (Nurse Practitioner)  Ray Judge MD as Consulting Physician (Hematology and Oncology)  Sara Patton MD as Consulting Physician (Rheumatology)            ASSESSMENT & PLAN    Diagnoses and all orders for this visit:    1. Fibromyalgia (Primary)  -     POC 12 Panel Urine Drug Screen    2. Elevated blood pressure reading  Assessment & Plan:  Discussed with patient that she has had about a 10 pound weight gain over the last 3 months, patient admits to eating poorly, states has had a lot going on at home and has been eating on the run, we discussed diet and exercise changes at length, discussed low-sodium diet, patient will make lifestyle changes and we will follow-up in 3 months for reevaluation, she will monitor her blood pressure at home, if no improvement we will initiate medication    Orders:  -     Comprehensive Metabolic Panel; Future  -     CBC & Differential; Future  -     Lipid Panel; Future    3. Chronic insomnia  Overview:  Patient currently living with Ambien CR, continue current medication    Orders:  -     zolpidem CR (Ambien CR) 12.5 MG CR tablet; Take 1 tablet by mouth At Night As Needed for Sleep.  Dispense: 30 tablet;  Refill: 2    4. Depression, unspecified depression type  Comments:  Well-controlled with Wellbutrin, continue current dose  Overview:  Stable on Savella and Wellbutrin, continue current medication    Orders:  -     Discontinue: Milnacipran HCl (Savella) 12.5 MG tablet; Take 1 tablet by mouth 2 (Two) Times a Day.  Dispense: 180 tablet; Refill: 1  -     buPROPion XL (WELLBUTRIN XL) 150 MG 24 hr tablet; Take 1 tablet by mouth Daily.  Dispense: 90 tablet; Refill: 0  -     Comprehensive Metabolic Panel; Future  -     CBC & Differential; Future  -     Lipid Panel; Future    5. Allergy, subsequent encounter  Comments:  advised to take Xyzal daily as prescribed  Orders:  -     levocetirizine (XYZAL) 5 MG tablet; Take 1 tablet by mouth Every Evening.  Dispense: 90 tablet; Refill: 1    6. Lipid screening  -     Lipid Panel; Future    7. Thyroid disorder screen  -     TSH Rfx On Abnormal To Free T4; Future    Other orders  -     fluticasone (FLONASE) 50 MCG/ACT nasal spray; Administer 2 sprays into the nostril(s) as directed by provider Daily.  Dispense: 15.8 g; Refill: 3         Tobacco Use: Low Risk  (3/11/2025)    Patient History     Smoking Tobacco Use: Never     Smokeless Tobacco Use: Never     Passive Exposure: Not on file       Follow Up     Return in about 3 months (around 6/11/2025), or if symptoms worsen or fail to improve.        Patient was given instructions and counseling regarding her condition or for health maintenance advice. Please see specific information pulled into the AVS if appropriate.   I have reviewed information obtained and documented by others and I have confirmed the accuracy of this documented note.    TIERRA Sharma

## 2025-03-11 NOTE — ASSESSMENT & PLAN NOTE
Discussed with patient that she has had about a 10 pound weight gain over the last 3 months, patient admits to eating poorly, states has had a lot going on at home and has been eating on the run, we discussed diet and exercise changes at length, discussed low-sodium diet, patient will make lifestyle changes and we will follow-up in 3 months for reevaluation, she will monitor her blood pressure at home, if no improvement we will initiate medication

## 2025-03-14 ENCOUNTER — LAB (OUTPATIENT)
Dept: LAB | Facility: HOSPITAL | Age: 40
End: 2025-03-14
Payer: OTHER GOVERNMENT

## 2025-03-14 DIAGNOSIS — R03.0 ELEVATED BLOOD PRESSURE READING: ICD-10-CM

## 2025-03-14 DIAGNOSIS — F32.A DEPRESSION, UNSPECIFIED DEPRESSION TYPE: ICD-10-CM

## 2025-03-14 DIAGNOSIS — Z13.29 THYROID DISORDER SCREEN: ICD-10-CM

## 2025-03-14 DIAGNOSIS — Z13.220 LIPID SCREENING: ICD-10-CM

## 2025-03-14 LAB
ALBUMIN SERPL-MCNC: 4 G/DL (ref 3.5–5.2)
ALBUMIN/GLOB SERPL: 1.3 G/DL
ALP SERPL-CCNC: 81 U/L (ref 39–117)
ALT SERPL W P-5'-P-CCNC: 15 U/L (ref 1–33)
ANION GAP SERPL CALCULATED.3IONS-SCNC: 7.1 MMOL/L (ref 5–15)
AST SERPL-CCNC: 21 U/L (ref 1–32)
BASOPHILS # BLD AUTO: 0.02 10*3/MM3 (ref 0–0.2)
BASOPHILS NFR BLD AUTO: 0.6 % (ref 0–1.5)
BILIRUB SERPL-MCNC: 0.3 MG/DL (ref 0–1.2)
BUN SERPL-MCNC: 14 MG/DL (ref 6–20)
BUN/CREAT SERPL: 14.9 (ref 7–25)
CALCIUM SPEC-SCNC: 8.8 MG/DL (ref 8.6–10.5)
CHLORIDE SERPL-SCNC: 107 MMOL/L (ref 98–107)
CHOLEST SERPL-MCNC: 140 MG/DL (ref 0–200)
CO2 SERPL-SCNC: 24.9 MMOL/L (ref 22–29)
CREAT SERPL-MCNC: 0.94 MG/DL (ref 0.57–1)
DEPRECATED RDW RBC AUTO: 42.1 FL (ref 37–54)
EGFRCR SERPLBLD CKD-EPI 2021: 79.8 ML/MIN/1.73
EOSINOPHIL # BLD AUTO: 0.06 10*3/MM3 (ref 0–0.4)
EOSINOPHIL NFR BLD AUTO: 1.9 % (ref 0.3–6.2)
ERYTHROCYTE [DISTWIDTH] IN BLOOD BY AUTOMATED COUNT: 13.6 % (ref 12.3–15.4)
GLOBULIN UR ELPH-MCNC: 3.1 GM/DL
GLUCOSE SERPL-MCNC: 91 MG/DL (ref 65–99)
HCT VFR BLD AUTO: 36.5 % (ref 34–46.6)
HDLC SERPL-MCNC: 50 MG/DL (ref 40–60)
HGB BLD-MCNC: 12.1 G/DL (ref 12–15.9)
IMM GRANULOCYTES # BLD AUTO: 0.01 10*3/MM3 (ref 0–0.05)
IMM GRANULOCYTES NFR BLD AUTO: 0.3 % (ref 0–0.5)
LDLC SERPL CALC-MCNC: 77 MG/DL (ref 0–100)
LDLC/HDLC SERPL: 1.54 {RATIO}
LYMPHOCYTES # BLD AUTO: 0.66 10*3/MM3 (ref 0.7–3.1)
LYMPHOCYTES NFR BLD AUTO: 21 % (ref 19.6–45.3)
MCH RBC QN AUTO: 28.5 PG (ref 26.6–33)
MCHC RBC AUTO-ENTMCNC: 33.2 G/DL (ref 31.5–35.7)
MCV RBC AUTO: 86.1 FL (ref 79–97)
MONOCYTES # BLD AUTO: 0.46 10*3/MM3 (ref 0.1–0.9)
MONOCYTES NFR BLD AUTO: 14.6 % (ref 5–12)
NEUTROPHILS NFR BLD AUTO: 1.94 10*3/MM3 (ref 1.7–7)
NEUTROPHILS NFR BLD AUTO: 61.6 % (ref 42.7–76)
NRBC BLD AUTO-RTO: 0 /100 WBC (ref 0–0.2)
PLATELET # BLD AUTO: 217 10*3/MM3 (ref 140–450)
PMV BLD AUTO: 11.2 FL (ref 6–12)
POTASSIUM SERPL-SCNC: 3.8 MMOL/L (ref 3.5–5.2)
PROT SERPL-MCNC: 7.1 G/DL (ref 6–8.5)
RBC # BLD AUTO: 4.24 10*6/MM3 (ref 3.77–5.28)
SODIUM SERPL-SCNC: 139 MMOL/L (ref 136–145)
TRIGL SERPL-MCNC: 65 MG/DL (ref 0–150)
TSH SERPL DL<=0.05 MIU/L-ACNC: 3.62 UIU/ML (ref 0.27–4.2)
VLDLC SERPL-MCNC: 13 MG/DL (ref 5–40)
WBC NRBC COR # BLD AUTO: 3.15 10*3/MM3 (ref 3.4–10.8)

## 2025-03-14 PROCEDURE — 80053 COMPREHEN METABOLIC PANEL: CPT

## 2025-03-14 PROCEDURE — 84443 ASSAY THYROID STIM HORMONE: CPT

## 2025-03-14 PROCEDURE — 80061 LIPID PANEL: CPT

## 2025-03-14 PROCEDURE — 36415 COLL VENOUS BLD VENIPUNCTURE: CPT

## 2025-03-14 PROCEDURE — 85025 COMPLETE CBC W/AUTO DIFF WBC: CPT

## 2025-03-17 ENCOUNTER — PATIENT MESSAGE (OUTPATIENT)
Dept: FAMILY MEDICINE CLINIC | Facility: CLINIC | Age: 40
End: 2025-03-17
Payer: OTHER GOVERNMENT

## 2025-05-20 ENCOUNTER — LAB (OUTPATIENT)
Dept: ONCOLOGY | Facility: HOSPITAL | Age: 40
End: 2025-05-20
Payer: OTHER GOVERNMENT

## 2025-05-20 DIAGNOSIS — M32.9 SYSTEMIC LUPUS ERYTHEMATOSUS, UNSPECIFIED SLE TYPE, UNSPECIFIED ORGAN INVOLVEMENT STATUS: ICD-10-CM

## 2025-05-20 LAB
ALBUMIN SERPL-MCNC: 4.3 G/DL (ref 3.5–5.2)
ALBUMIN/GLOB SERPL: 1.2 G/DL
ALP SERPL-CCNC: 84 U/L (ref 39–117)
ALT SERPL W P-5'-P-CCNC: 14 U/L (ref 1–33)
ANION GAP SERPL CALCULATED.3IONS-SCNC: 10.9 MMOL/L (ref 5–15)
AST SERPL-CCNC: 19 U/L (ref 1–32)
BASOPHILS # BLD AUTO: 0.02 10*3/MM3 (ref 0–0.2)
BASOPHILS NFR BLD AUTO: 0.6 % (ref 0–1.5)
BILIRUB SERPL-MCNC: 0.3 MG/DL (ref 0–1.2)
BUN SERPL-MCNC: 12 MG/DL (ref 6–20)
BUN/CREAT SERPL: 14.8 (ref 7–25)
CALCIUM SPEC-SCNC: 8.7 MG/DL (ref 8.6–10.5)
CHLORIDE SERPL-SCNC: 106 MMOL/L (ref 98–107)
CO2 SERPL-SCNC: 21.1 MMOL/L (ref 22–29)
CREAT SERPL-MCNC: 0.81 MG/DL (ref 0.57–1)
DEPRECATED RDW RBC AUTO: 40.8 FL (ref 37–54)
EGFRCR SERPLBLD CKD-EPI 2021: 94.8 ML/MIN/1.73
EOSINOPHIL # BLD AUTO: 0.08 10*3/MM3 (ref 0–0.4)
EOSINOPHIL NFR BLD AUTO: 2.4 % (ref 0.3–6.2)
ERYTHROCYTE [DISTWIDTH] IN BLOOD BY AUTOMATED COUNT: 13.3 % (ref 12.3–15.4)
GLOBULIN UR ELPH-MCNC: 3.5 GM/DL
GLUCOSE SERPL-MCNC: 115 MG/DL (ref 65–99)
HCT VFR BLD AUTO: 35.6 % (ref 34–46.6)
HGB BLD-MCNC: 11.9 G/DL (ref 12–15.9)
IMM GRANULOCYTES # BLD AUTO: 0.01 10*3/MM3 (ref 0–0.05)
IMM GRANULOCYTES NFR BLD AUTO: 0.3 % (ref 0–0.5)
LYMPHOCYTES # BLD AUTO: 0.83 10*3/MM3 (ref 0.7–3.1)
LYMPHOCYTES NFR BLD AUTO: 25.4 % (ref 19.6–45.3)
MCH RBC QN AUTO: 27.9 PG (ref 26.6–33)
MCHC RBC AUTO-ENTMCNC: 33.4 G/DL (ref 31.5–35.7)
MCV RBC AUTO: 83.4 FL (ref 79–97)
MONOCYTES # BLD AUTO: 0.44 10*3/MM3 (ref 0.1–0.9)
MONOCYTES NFR BLD AUTO: 13.5 % (ref 5–12)
NEUTROPHILS NFR BLD AUTO: 1.89 10*3/MM3 (ref 1.7–7)
NEUTROPHILS NFR BLD AUTO: 57.8 % (ref 42.7–76)
NRBC BLD AUTO-RTO: 0 /100 WBC (ref 0–0.2)
PLATELET # BLD AUTO: 230 10*3/MM3 (ref 140–450)
PMV BLD AUTO: 9.9 FL (ref 6–12)
POTASSIUM SERPL-SCNC: 3.7 MMOL/L (ref 3.5–5.2)
PROT SERPL-MCNC: 7.8 G/DL (ref 6–8.5)
RBC # BLD AUTO: 4.27 10*6/MM3 (ref 3.77–5.28)
SODIUM SERPL-SCNC: 138 MMOL/L (ref 136–145)
WBC NRBC COR # BLD AUTO: 3.27 10*3/MM3 (ref 3.4–10.8)

## 2025-05-20 PROCEDURE — 80053 COMPREHEN METABOLIC PANEL: CPT

## 2025-05-20 PROCEDURE — 85025 COMPLETE CBC W/AUTO DIFF WBC: CPT

## 2025-05-20 PROCEDURE — 36415 COLL VENOUS BLD VENIPUNCTURE: CPT

## 2025-05-22 ENCOUNTER — OFFICE VISIT (OUTPATIENT)
Dept: ONCOLOGY | Facility: HOSPITAL | Age: 40
End: 2025-05-22
Payer: OTHER GOVERNMENT

## 2025-05-22 VITALS
DIASTOLIC BLOOD PRESSURE: 85 MMHG | TEMPERATURE: 98.1 F | RESPIRATION RATE: 18 BRPM | OXYGEN SATURATION: 100 % | HEIGHT: 64 IN | SYSTOLIC BLOOD PRESSURE: 144 MMHG | BODY MASS INDEX: 29.5 KG/M2 | WEIGHT: 172.8 LBS | HEART RATE: 84 BPM

## 2025-05-22 DIAGNOSIS — D68.52 PROTHROMBIN GENE MUTATION: ICD-10-CM

## 2025-05-22 DIAGNOSIS — I82.4Y2 ACUTE DEEP VEIN THROMBOSIS (DVT) OF PROXIMAL VEIN OF LEFT LOWER EXTREMITY: ICD-10-CM

## 2025-05-22 DIAGNOSIS — M32.9 SYSTEMIC LUPUS ERYTHEMATOSUS, UNSPECIFIED SLE TYPE, UNSPECIFIED ORGAN INVOLVEMENT STATUS: Primary | ICD-10-CM

## 2025-05-22 DIAGNOSIS — D68.51 FACTOR V LEIDEN: ICD-10-CM

## 2025-05-22 PROCEDURE — G0463 HOSPITAL OUTPT CLINIC VISIT: HCPCS | Performed by: INTERNAL MEDICINE

## 2025-05-22 NOTE — PROGRESS NOTES
Chief Complaint  Systemic lupus erythematosus, unspecified SLE type, unspeci    Ling Ignacio, AP*  Ling Ignacio, APRN      Subjective      Diagnosis: Heterozygous Prothrombin mutation and Heterozygous Factor V Leiden mutation    H/o Lupus under management of Rheumatologist Dr. Patton    Leukopenia-active surveillance    Current Treatment: Eliquis BID-for thrombophilia    Previous Treatment: None        Eloisa Sims presents to [facility] for follow up on lab results and refill for Eliquis.     History of Present Illness   Ms. Sims was seen previously in consultation in July with Dr. Ch after developing a left upper leg DVT after a fall. She has had a previous clot in the right lower extremity. She was evaluated for genetic defects with the hypercoagulable state.     Factor II level was found to be heterozygous as well as factor V leiden was heterozygous. She also has lupus diagnosis and has not seen her rheumatology yet but scheduled for later in November. She is compliant with the daily Eliquis she is taking BID dosing. She reports her  PCP wants us to prescribe anti-coagulation.     Interval History: Patient presents for follow-up for management of anticoagulation given her history of DVT as well as diagnosis of heterozygous prothrombin mutation and heterozygous factor V Leiden mutation.  She is again without any blood clots since last clinic appointment. Again, no report of any blood loss or melena.  She again reports tolerating Eliquis and compliance with this medication. Pt continues to follow up with her rheumatologist to address lupus. Pt continues to take Plaquenil for Lupus. She denies any change in medications for lupus treatment.    Oncology/Hematology History    No history exists.       Review of Systems   Constitutional:  Positive for fatigue (6/10). Negative for appetite change, diaphoresis, fever, unexpected weight gain and unexpected  weight loss.   HENT: Negative.  Negative for hearing loss, mouth sores, sore throat, swollen glands, trouble swallowing and voice change.    Eyes: Negative.  Negative for blurred vision.   Respiratory: Negative.  Negative for cough, shortness of breath and wheezing.    Cardiovascular: Negative.  Negative for chest pain and palpitations.   Gastrointestinal: Negative.  Negative for abdominal pain, blood in stool, constipation, diarrhea, nausea and vomiting.   Endocrine: Negative.  Negative for cold intolerance and heat intolerance.   Genitourinary: Negative.  Negative for difficulty urinating, dysuria, frequency, hematuria and urinary incontinence.   Musculoskeletal: Negative.  Negative for arthralgias, back pain and myalgias.   Skin:  Negative for rash, skin lesions and wound.   Allergic/Immunologic: Negative.    Neurological: Negative.  Negative for dizziness, seizures, weakness, numbness and headache.   Hematological: Negative.  Does not bruise/bleed easily.   Psychiatric/Behavioral: Negative.  Negative for depressed mood. The patient is not nervous/anxious.    All other systems reviewed and are negative.      Current Outpatient Medications on File Prior to Visit   Medication Sig Dispense Refill    apixaban (ELIQUIS) 5 MG tablet tablet Take 1 tablet by mouth 2 (Two) Times a Day. 180 tablet 1    buPROPion XL (WELLBUTRIN XL) 150 MG 24 hr tablet Take 1 tablet by mouth Daily. 90 tablet 0    clonazePAM (KlonoPIN) 1 MG tablet Take 1 tablet by mouth Daily As Needed for Anxiety. 30 tablet 0    fluticasone (FLONASE) 50 MCG/ACT nasal spray Administer 2 sprays into the nostril(s) as directed by provider Daily. 15.8 g 3    folic acid (FOLVITE) 1 MG tablet Take 1 tablet by mouth Daily. 90 tablet 1    hydroxychloroquine (PLAQUENIL) 200 MG tablet Take 1 tablet by mouth Daily.      levocetirizine (XYZAL) 5 MG tablet Take 1 tablet by mouth Every Evening. 90 tablet 1    Milnacipran HCl (Savella) 12.5 MG tablet Take 1 tablet by mouth  2 (Two) Times a Day. 180 tablet 1    multivitamin with minerals (MULTIVITAMIN ADULT PO) Take 1 tablet by mouth Daily.      zolpidem CR (Ambien CR) 12.5 MG CR tablet Take 1 tablet by mouth At Night As Needed for Sleep. 30 tablet 2    methylPREDNISolone (MEDROL) 4 MG dose pack Take as directed on package instructions. (Patient not taking: Reported on 2025) 1 each 0     No current facility-administered medications on file prior to visit.       Allergies   Allergen Reactions    Penicillins Hives    Sulfa Antibiotics Hives     Past Medical History:   Diagnosis Date    Anxiety YOLIE- 2015    Deep vein thrombosis     Factor 5 Leiden mutation, heterozygous     Fibromyalgia     Fibromyalgia, primary 2019    Insomnia     SLE (systemic lupus erythematosus)      Past Surgical History:   Procedure Laterality Date     SECTION      TONSILLECTOMY       Social History     Socioeconomic History    Marital status:    Tobacco Use    Smoking status: Never    Smokeless tobacco: Never   Vaping Use    Vaping status: Never Used   Substance and Sexual Activity    Alcohol use: Yes     Alcohol/week: 1.0 standard drink of alcohol     Types: 1 Glasses of wine per week     Comment: social/rare drinker of wine    Drug use: Never    Sexual activity: Yes     Partners: Male     Comment:  had vasectomy     Family History   Problem Relation Age of Onset    Anxiety disorder Mother     Hyperlipidemia Father     Cancer Paternal Grandmother     Hyperlipidemia Paternal Grandmother     Diabetes Paternal Grandfather        There is no immunization history on file for this patient.    Objective   Physical Exam  Vitals reviewed. Exam conducted with a chaperone present.   Constitutional:       Appearance: Normal appearance. She is normal weight.   HENT:      Head: Normocephalic.   Eyes:      Pupils: Pupils are equal, round, and reactive to light.   Pulmonary:      Effort: Pulmonary effort is normal.   Musculoskeletal:          "General: Normal range of motion.      Cervical back: Normal range of motion and neck supple.   Neurological:      General: No focal deficit present.      Mental Status: She is alert and oriented to person, place, and time.         Vitals:    05/22/25 1516   BP: 144/85   Pulse: 84   Resp: 18   Temp: 98.1 °F (36.7 °C)   TempSrc: Temporal   SpO2: 100%   Weight: 78.4 kg (172 lb 12.8 oz)   Height: 162.6 cm (64\")   PainSc: 0-No pain             ECOG score: 0         ECOG: (0) Fully Active - Able to Carry On All Pre-disease Performance Without Restriction  Fall Risk Assessment was completed, and patient is at low risk for falls.  PHQ-9 Total Score:         The patient is  experiencing fatigue. Fatigue score: 7          Result Review :   The following data was reviewed by: Ray Judge MD on 09/07/2022:  Lab Results   Component Value Date    HGB 11.9 (L) 05/20/2025    HCT 35.6 05/20/2025    MCV 83.4 05/20/2025     05/20/2025    WBC 3.27 (L) 05/20/2025    NEUTROABS 1.89 05/20/2025    LYMPHSABS 0.83 05/20/2025    MONOSABS 0.44 05/20/2025    EOSABS 0.08 05/20/2025    BASOSABS 0.02 05/20/2025     Lab Results   Component Value Date    GLUCOSE 115 (H) 05/20/2025    BUN 12 05/20/2025    CREATININE 0.81 05/20/2025     05/20/2025    K 3.7 05/20/2025     05/20/2025    CO2 21.1 (L) 05/20/2025    CALCIUM 8.7 05/20/2025    PROTEINTOT 7.8 05/20/2025    ALBUMIN 4.3 05/20/2025    BILITOT 0.3 05/20/2025    ALKPHOS 84 05/20/2025    AST 19 05/20/2025    ALT 14 05/20/2025          Assessment and Plan    Diagnoses and all orders for this visit:    1. Systemic lupus erythematosus, unspecified SLE type, unspecified organ involvement status (Primary)    2. Factor V Leiden    3. Acute deep vein thrombosis (DVT) of proximal vein of left lower extremity    4. Prothrombin gene mutation                 She is positive for heterozygous prothrombin gene mutation as well as heterozygous FVL. Given her history of 2 DVTs, along with " history of heterozygous prothrombin gene mutation as well as heterozygous factor V Leiden mutation, I would recommend lifelong anti-coagulation with Eliquis.   She reports her PCP would like for us to refill her Eliquis, provided Eliquis refills today    Leukopenia: Discussed most recent CBC and CMP from 5/20/2025 which were unremarkable. Again, no recent  infections, B symptoms, or lupus flares recently.  Recently results of flow cytometry obtained on peripheral blood at previous clinic appointment revealed B cells that show nonspecific light chain binding.  Thus, obtained lab work to assess levels of immunoglobulins as well as SPEP, free light chains, immunofixation (SPEP was negative for M spike, K/L FLC ratio was 1.26- normal from 4/2023) since this work-up was unremarkable suspect could be secondary to lupus medications prescribed by rheumatologist and/or secondary to her autoimmune conditions. Also, immunoglobulin levels form 4/12/23 were within normal limits. Recommend pt follow up with her Rheumatologist as scheduled for continued management of lupus. Will continue to monitor blood counts.    Plan for patient to follow-up in 12 months to recheck lab values with CBC and CMP.     Would consider BMBx if pt with worsening cytopenias on repeat blood work.     Patient verbalized understanding and agreement with plan.      Electronically signed by Ray Judge MD, 05/22/25, 3:53 PM EDT.        I spent 30 minutes caring for Eloisa on this date of service. This time includes time spent by me in the following activities:preparing for the visit, reviewing tests, obtaining and/or reviewing a separately obtained history, performing a medically appropriate examination and/or evaluation , counseling and educating the patient/family/caregiver, ordering medications, tests, or procedures, referring and communicating with other health care professionals , documenting information in the medical record, independently  interpreting results and communicating that information with the patient/family/caregiver and care coordination        Patient was given instructions and counseling regarding her condition or for health maintenance advice. Please see specific information pulled into the AVS if appropriate.           Ray Judge MD    5/22/2025

## 2025-06-05 ENCOUNTER — PATIENT MESSAGE (OUTPATIENT)
Dept: FAMILY MEDICINE CLINIC | Facility: CLINIC | Age: 40
End: 2025-06-05
Payer: OTHER GOVERNMENT

## 2025-06-05 DIAGNOSIS — F51.04 CHRONIC INSOMNIA: ICD-10-CM

## 2025-06-05 RX ORDER — ZOLPIDEM TARTRATE 12.5 MG/1
12.5 TABLET, FILM COATED, EXTENDED RELEASE ORAL NIGHTLY PRN
Qty: 30 TABLET | Refills: 2 | Status: SHIPPED | OUTPATIENT
Start: 2025-06-05

## 2025-06-06 ENCOUNTER — TELEPHONE (OUTPATIENT)
Dept: FAMILY MEDICINE CLINIC | Facility: CLINIC | Age: 40
End: 2025-06-06
Payer: OTHER GOVERNMENT

## 2025-06-06 NOTE — TELEPHONE ENCOUNTER
Patient's  dropped off patient's disability paperwork wanting to see if Sandee could fill it out and give them a call when it's complete. He highlighted a section on the first page wanting Sandee to pay attention to it.

## 2025-06-09 ENCOUNTER — TELEMEDICINE (OUTPATIENT)
Dept: FAMILY MEDICINE CLINIC | Facility: CLINIC | Age: 40
End: 2025-06-09
Payer: OTHER GOVERNMENT

## 2025-06-09 DIAGNOSIS — M79.7 FIBROMYALGIA: ICD-10-CM

## 2025-06-09 DIAGNOSIS — D68.51 FACTOR V LEIDEN: ICD-10-CM

## 2025-06-09 DIAGNOSIS — M32.9 SYSTEMIC LUPUS ERYTHEMATOSUS, UNSPECIFIED SLE TYPE, UNSPECIFIED ORGAN INVOLVEMENT STATUS: Primary | ICD-10-CM

## 2025-06-09 DIAGNOSIS — F51.04 CHRONIC INSOMNIA: ICD-10-CM

## 2025-06-09 PROCEDURE — 99214 OFFICE O/P EST MOD 30 MIN: CPT | Performed by: NURSE PRACTITIONER

## 2025-06-09 NOTE — PROGRESS NOTES
Chief Complaint  Follow-up fibromyalgia, insomnia, paperwork    SUBJECTIVE  Eloisa Sims presents to [facility]     Patient presents today to discuss her chronic conditions, needing paperwork completed to verify her need for her various specialists and frequency of appointments.    History of Present Illness  Past Medical History:   Diagnosis Date    Anxiety YOLIE- 2015    Deep vein thrombosis     Factor 5 Leiden mutation, heterozygous     Fibromyalgia     Fibromyalgia, primary 2019    Insomnia     SLE (systemic lupus erythematosus)       Family History   Problem Relation Age of Onset    Anxiety disorder Mother     Hyperlipidemia Father     Cancer Paternal Grandmother     Hyperlipidemia Paternal Grandmother     Diabetes Paternal Grandfather       Past Surgical History:   Procedure Laterality Date     SECTION      TONSILLECTOMY          Current Outpatient Medications:     apixaban (ELIQUIS) 5 MG tablet tablet, Take 1 tablet by mouth 2 (Two) Times a Day., Disp: 180 tablet, Rfl: 1    buPROPion XL (WELLBUTRIN XL) 150 MG 24 hr tablet, Take 1 tablet by mouth Daily., Disp: 90 tablet, Rfl: 0    clonazePAM (KlonoPIN) 1 MG tablet, Take 1 tablet by mouth Daily As Needed for Anxiety., Disp: 30 tablet, Rfl: 0    fluticasone (FLONASE) 50 MCG/ACT nasal spray, Administer 2 sprays into the nostril(s) as directed by provider Daily., Disp: 15.8 g, Rfl: 3    folic acid (FOLVITE) 1 MG tablet, Take 1 tablet by mouth Daily., Disp: 90 tablet, Rfl: 1    hydroxychloroquine (PLAQUENIL) 200 MG tablet, Take 1 tablet by mouth Daily., Disp: , Rfl:     levocetirizine (XYZAL) 5 MG tablet, Take 1 tablet by mouth Every Evening., Disp: 90 tablet, Rfl: 1    Milnacipran HCl (Savella) 12.5 MG tablet, Take 1 tablet by mouth 2 (Two) Times a Day., Disp: 180 tablet, Rfl: 1    multivitamin with minerals (MULTIVITAMIN ADULT PO), Take 1 tablet by mouth Daily., Disp: , Rfl:     zolpidem CR (Ambien CR) 12.5 MG  "CR tablet, Take 1 tablet by mouth At Night As Needed for Sleep., Disp: 30 tablet, Rfl: 2    OBJECTIVE  Vital Signs:   There were no vitals taken for this visit.   Estimated body mass index is 29.66 kg/m² as calculated from the following:    Height as of 5/22/25: 162.6 cm (64\").    Weight as of 5/22/25: 78.4 kg (172 lb 12.8 oz).     Wt Readings from Last 3 Encounters:   05/22/25 78.4 kg (172 lb 12.8 oz)   03/11/25 80.3 kg (177 lb)   12/10/24 76.2 kg (168 lb)     BP Readings from Last 3 Encounters:   05/22/25 144/85   03/11/25 140/85   11/20/24 122/93       Physical Exam  Constitutional:       Appearance: Normal appearance.   Pulmonary:      Effort: Pulmonary effort is normal.   Neurological:      Mental Status: She is alert and oriented to person, place, and time.   Psychiatric:         Mood and Affect: Mood normal.         Behavior: Behavior normal.         Thought Content: Thought content normal.         Judgment: Judgment normal.          Result Review    CMP          11/18/2024    15:17 3/14/2025    08:30 5/20/2025    14:30   CMP   Glucose 101  91  115    BUN 13  14  12    Creatinine 0.84  0.94  0.81    EGFR 91.3  79.8  94.8    Sodium 139  139  138    Potassium 4.0  3.8  3.7    Chloride 104  107  106    Calcium 8.9  8.8  8.7    Total Protein 8.0  7.1  7.8    Albumin 4.5  4.0  4.3    Globulin 3.5  3.1  3.5    Total Bilirubin 0.3  0.3  0.3    Alkaline Phosphatase 87  81  84    AST (SGOT) 14  21  19    ALT (SGPT) 13  15  14    Albumin/Globulin Ratio 1.3  1.3  1.2    BUN/Creatinine Ratio 15.5  14.9  14.8    Anion Gap 11.5  7.1  10.9      CBC          11/18/2024    15:17 3/14/2025    08:30 5/20/2025    14:30   CBC   WBC 3.60  3.15  3.27    RBC 4.35  4.24  4.27    Hemoglobin 12.0  12.1  11.9    Hematocrit 36.4  36.5  35.6    MCV 83.7  86.1  83.4    MCH 27.6  28.5  27.9    MCHC 33.0  33.2  33.4    RDW 13.2  13.6  13.3    Platelets 240  217  230      TSH          3/14/2025    08:30   TSH   TSH 3.620        No Images in " the past 120 days found..     The above data has been reviewed by TIERRA Sharma 06/09/2025 10:25 EDT.          Patient Care Team:  Ling Ignacio APRN as PCP - General (Nurse Practitioner)  Ray Judge MD as Consulting Physician (Hematology and Oncology)  Sara Patton MD as Consulting Physician (Rheumatology)            ASSESSMENT & PLAN    Diagnoses and all orders for this visit:    1. Systemic lupus erythematosus, unspecified SLE type, unspecified organ involvement status (Primary)  Assessment & Plan:  Managed by rheumatology, seeing them every 3 months, taking Plaquenil, she will continue regular follow-up      2. Chronic insomnia  Overview:  Stable and controlled with Ambien CR, continue current medication, follow good sleep hygiene6      3. Fibromyalgia  Overview:  Stable and controlled with Savella, continue current medication      4. Factor V Leiden  Assessment & Plan:  Stable on Eliquis twice daily lifelong, patient will continue regular follow-ups with hematology         Paperwork completed      Tobacco Use: Low Risk  (6/24/2025)    Patient History     Smoking Tobacco Use: Never     Smokeless Tobacco Use: Never     Passive Exposure: Not on file       Follow Up     Return for Next scheduled follow up.        Patient was given instructions and counseling regarding her condition or for health maintenance advice. Please see specific information pulled into the AVS if appropriate.   I have reviewed information obtained and documented by others and I have confirmed the accuracy of this documented note.    TIERRA Sharma

## 2025-06-24 PROBLEM — D68.51 FACTOR V LEIDEN: Status: ACTIVE | Noted: 2025-06-24

## 2025-06-24 NOTE — ASSESSMENT & PLAN NOTE
Managed by rheumatology, seeing them every 3 months, taking Plaquenil, she will continue regular follow-up